# Patient Record
Sex: FEMALE | Race: WHITE | Employment: FULL TIME | URBAN - METROPOLITAN AREA
[De-identification: names, ages, dates, MRNs, and addresses within clinical notes are randomized per-mention and may not be internally consistent; named-entity substitution may affect disease eponyms.]

---

## 2017-01-03 RX ORDER — LOPERAMIDE HYDROCHLORIDE 2 MG/1
CAPSULE ORAL
Qty: 60 CAPSULE | Refills: 0 | OUTPATIENT
Start: 2017-01-03

## 2017-01-11 ENCOUNTER — TELEPHONE (OUTPATIENT)
Dept: INTERNAL MEDICINE CLINIC | Facility: CLINIC | Age: 22
End: 2017-01-11

## 2017-01-11 NOTE — TELEPHONE ENCOUNTER
Spoke to pt. States she missed the last step this morning while carrying her baby. Pt landed on right side. C/o right whole hand numbness and tingling that is now radiating up forearm. Denies swelling, pain, or bruising. No change in .   Will updat

## 2017-01-11 NOTE — TELEPHONE ENCOUNTER
She needs an evaluation and possible imaging. Would recommend she go to immediate care for evaluation.

## 2017-01-11 NOTE — TELEPHONE ENCOUNTER
Patient took a fall down the stairs at home this morning and her right hand is tingling and has numbness. She reports there is no discoloration or swelling. 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

## 2017-01-27 ENCOUNTER — TELEPHONE (OUTPATIENT)
Dept: INTERNAL MEDICINE CLINIC | Facility: CLINIC | Age: 22
End: 2017-01-27

## 2017-01-28 ENCOUNTER — OFFICE VISIT (OUTPATIENT)
Dept: FAMILY MEDICINE CLINIC | Facility: CLINIC | Age: 22
End: 2017-01-28

## 2017-01-28 VITALS
RESPIRATION RATE: 18 BRPM | BODY MASS INDEX: 41.77 KG/M2 | TEMPERATURE: 98 F | WEIGHT: 282 LBS | DIASTOLIC BLOOD PRESSURE: 90 MMHG | OXYGEN SATURATION: 98 % | HEIGHT: 69 IN | SYSTOLIC BLOOD PRESSURE: 130 MMHG | HEART RATE: 77 BPM

## 2017-01-28 DIAGNOSIS — J02.9 SORE THROAT: ICD-10-CM

## 2017-01-28 DIAGNOSIS — J00 ACUTE NASOPHARYNGITIS: Primary | ICD-10-CM

## 2017-01-28 LAB — CONTROL LINE PRESENT WITH A CLEAR BACKGROUND (YES/NO): YES YES/NO

## 2017-01-28 PROCEDURE — 87081 CULTURE SCREEN ONLY: CPT | Performed by: PHYSICIAN ASSISTANT

## 2017-01-28 PROCEDURE — 87880 STREP A ASSAY W/OPTIC: CPT | Performed by: PHYSICIAN ASSISTANT

## 2017-01-28 PROCEDURE — 99213 OFFICE O/P EST LOW 20 MIN: CPT | Performed by: PHYSICIAN ASSISTANT

## 2017-01-28 RX ORDER — FLUTICASONE PROPIONATE 50 MCG
SPRAY, SUSPENSION (ML) NASAL
Qty: 1 BOTTLE | Refills: 1 | Status: SHIPPED | OUTPATIENT
Start: 2017-01-28 | End: 2017-03-27

## 2017-01-28 RX ORDER — GUAIFENESIN 600 MG
1200 TABLET, EXTENDED RELEASE 12 HR ORAL 2 TIMES DAILY
Qty: 30 TABLET | Refills: 0 | Status: SHIPPED | OUTPATIENT
Start: 2017-01-28 | End: 2017-03-27

## 2017-01-28 RX ORDER — GUAIFENESIN 600 MG
1200 TABLET, EXTENDED RELEASE 12 HR ORAL 2 TIMES DAILY
Qty: 30 TABLET | Refills: 0 | Status: SHIPPED | OUTPATIENT
Start: 2017-01-28 | End: 2017-01-28

## 2017-01-28 NOTE — PROGRESS NOTES
CHIEF COMPLAINT:   Patient presents with:  Sore Throat: upset stomach, runny nose x 5 days         HPI:   Kemar Pollard is a 19year old female presents to clinic with complaint of sore throat. Patient has had for 5 days.  Symptoms have been worsening SKIN: no rashes,no suspicious lesions  HEAD: atraumatic, normocephalic  EYES: conjunctiva clear, EOM intact  EARS: TM's clear, non-injected, no bulging, retraction, or fluid bilaterally  NOSE: nostrils patent, mild clear nasal discharge, nasal mucosa mildl Self-Care for Sore Throats  Sore throats happen for many reasons, such as colds, allergies, and infections caused by viruses or bacteria. In any case, your throat becomes red and sore.  Your goal for self-care is to reduce your discomfort while giving your Contact your healthcare provider if you have:  · A temperature over 101°F (38.3°C)  · White spots on the throat  · Great difficulty swallowing  · Trouble breathing  · A skin rash  · Recent exposure to someone else with strep bacteria  · Severe hoarseness a

## 2017-02-01 RX ORDER — FLUCONAZOLE 150 MG/1
TABLET ORAL
Qty: 2 TABLET | Refills: 0 | OUTPATIENT
Start: 2017-02-01

## 2017-02-17 ENCOUNTER — TELEPHONE (OUTPATIENT)
Dept: OBGYN CLINIC | Facility: CLINIC | Age: 22
End: 2017-02-17

## 2017-02-17 NOTE — TELEPHONE ENCOUNTER
Received fax from [facility] requesting   Lutera order for 3 month supply. Pt only has 1 month on file and insurance  Only covers 3 months at a time. Pt cancelled scheduled annual 1/28/17. No apt rescheduled.      PSR-please help pt schedule annual a

## 2017-02-20 RX ORDER — LEVONORGESTREL AND ETHINYL ESTRADIOL 0.1-0.02MG
1 KIT ORAL DAILY
Qty: 3 PACKAGE | Refills: 0 | Status: SHIPPED | OUTPATIENT
Start: 2017-02-20 | End: 2017-03-27

## 2017-02-20 NOTE — TELEPHONE ENCOUNTER
Med ordered x 3 months. Pt was to start new pack on Sunday; missed one day. Pt advised to start pack today; take 2 pills today and continue with one per day. Pt states she is not sexually active.   Pt advised to use back up birth control during this pack

## 2017-02-20 NOTE — TELEPHONE ENCOUNTER
Pt calling and made Annual for 4/27/16    Please refill medication    2) Pt missed 2 days of birth control      Please call and advise

## 2017-03-27 ENCOUNTER — OFFICE VISIT (OUTPATIENT)
Dept: OBGYN CLINIC | Facility: CLINIC | Age: 22
End: 2017-03-27

## 2017-03-27 VITALS
BODY MASS INDEX: 41.95 KG/M2 | HEIGHT: 68.5 IN | SYSTOLIC BLOOD PRESSURE: 100 MMHG | WEIGHT: 280 LBS | HEART RATE: 110 BPM | DIASTOLIC BLOOD PRESSURE: 66 MMHG

## 2017-03-27 DIAGNOSIS — Z11.3 SCREENING FOR STD (SEXUALLY TRANSMITTED DISEASE): ICD-10-CM

## 2017-03-27 DIAGNOSIS — Z12.39 BREAST CANCER SCREENING: ICD-10-CM

## 2017-03-27 DIAGNOSIS — N89.8 VAGINAL DISCHARGE: ICD-10-CM

## 2017-03-27 DIAGNOSIS — Z01.419 ENCOUNTER FOR GYNECOLOGICAL EXAMINATION WITHOUT ABNORMAL FINDING: Primary | ICD-10-CM

## 2017-03-27 DIAGNOSIS — Z12.4 CERVICAL CANCER SCREENING: ICD-10-CM

## 2017-03-27 PROCEDURE — 87491 CHLMYD TRACH DNA AMP PROBE: CPT | Performed by: OBSTETRICS & GYNECOLOGY

## 2017-03-27 PROCEDURE — 87591 N.GONORRHOEAE DNA AMP PROB: CPT | Performed by: OBSTETRICS & GYNECOLOGY

## 2017-03-27 PROCEDURE — 87510 GARDNER VAG DNA DIR PROBE: CPT | Performed by: OBSTETRICS & GYNECOLOGY

## 2017-03-27 PROCEDURE — 99395 PREV VISIT EST AGE 18-39: CPT | Performed by: OBSTETRICS & GYNECOLOGY

## 2017-03-27 PROCEDURE — 87480 CANDIDA DNA DIR PROBE: CPT | Performed by: OBSTETRICS & GYNECOLOGY

## 2017-03-27 PROCEDURE — 87660 TRICHOMONAS VAGIN DIR PROBE: CPT | Performed by: OBSTETRICS & GYNECOLOGY

## 2017-03-27 RX ORDER — LEVONORGESTREL AND ETHINYL ESTRADIOL 0.1-0.02MG
1 KIT ORAL DAILY
Qty: 3 PACKAGE | Refills: 4 | Status: SHIPPED | OUTPATIENT
Start: 2017-03-27 | End: 2017-08-07

## 2017-03-27 NOTE — PROGRESS NOTES
GYN H&P     3/27/2017  6:13 PM    CC: No chief complaint on file. HPI: patient is a 23year old [id number] here for her annual gyne exam.   She has no major complaints.  Noted some vaginal discharge, no burning or irritiation   Menses are regular on ocp, facility-administered medications on file prior to visit.   Family History   Problem Relation Age of Onset   • Stroke Mother    • Cancer Mother      breast   • Psychiatric Mother    • Thyroid Disorder Mother    • Diabetes Maternal Grandmother    • Breast Ca apparent distress  SKIN: no rashes, no suspicious lesions  HEENT: normal  NECK: supple; no thyroidmegaly, no adenopathy  LUNGS: clear to auscultation  CARDIOVASCULAR: normal S1, S2, RRR  BREASTS: soft, nontendder, no palpable masses or nodes, no nipple dis

## 2017-03-28 LAB
C TRACH DNA SPEC QL NAA+PROBE: NEGATIVE
N GONORRHOEA DNA SPEC QL NAA+PROBE: NEGATIVE

## 2017-03-28 RX ORDER — METRONIDAZOLE 7.5 MG/G
1 GEL VAGINAL NIGHTLY
Qty: 1 TUBE | Refills: 0 | Status: SHIPPED | OUTPATIENT
Start: 2017-03-28 | End: 2017-08-07

## 2017-03-28 RX ORDER — METRONIDAZOLE 7.5 MG/G
1 GEL VAGINAL NIGHTLY
Qty: 70 G | Refills: 0 | Status: SHIPPED | OUTPATIENT
Start: 2017-03-28 | End: 2017-04-02

## 2017-03-28 NOTE — PROGRESS NOTES
Quick Note:    Patient informed of results. Verbalized understanding. RX sent to pharmacy. No further questions or concerns @ this time.   ______

## 2017-03-28 NOTE — PROGRESS NOTES
Quick Note:    Please call patient and let her know she was positive for BV. GC still pending.  I will treat it with Metrogel, I will place RX.  ______

## 2017-04-04 ENCOUNTER — TELEPHONE (OUTPATIENT)
Dept: OBGYN CLINIC | Facility: CLINIC | Age: 22
End: 2017-04-04

## 2017-04-04 RX ORDER — METRONIDAZOLE 500 MG/1
500 TABLET ORAL 2 TIMES DAILY
Qty: 14 TABLET | Refills: 0 | Status: SHIPPED | OUTPATIENT
Start: 2017-04-04 | End: 2017-08-07

## 2017-04-04 NOTE — TELEPHONE ENCOUNTER
Pt last seen 3/27/17. BV per vaginitis swab. Pt completed Metrogel x 7 days; reports no improvement of symptoms. Pt c/o clear, watery discharge and itching; denies odor. Routed to Dr. Estrella Henderson. Please advise.

## 2017-04-04 NOTE — TELEPHONE ENCOUNTER
Medication sent to pharmacy. Patient notified. Verbalized understanding. No further questions or concerns.

## 2017-04-27 ENCOUNTER — TELEPHONE (OUTPATIENT)
Dept: INTERNAL MEDICINE CLINIC | Facility: CLINIC | Age: 22
End: 2017-04-27

## 2017-04-27 ENCOUNTER — HOSPITAL ENCOUNTER (OUTPATIENT)
Age: 22
Discharge: HOME | End: 2017-04-27
Attending: FAMILY MEDICINE
Payer: COMMERCIAL

## 2017-04-27 ENCOUNTER — APPOINTMENT (OUTPATIENT)
Dept: GENERAL RADIOLOGY | Age: 22
End: 2017-04-27
Attending: FAMILY MEDICINE
Payer: COMMERCIAL

## 2017-04-27 VITALS
SYSTOLIC BLOOD PRESSURE: 131 MMHG | RESPIRATION RATE: 16 BRPM | HEART RATE: 78 BPM | OXYGEN SATURATION: 99 % | WEIGHT: 277 LBS | HEIGHT: 69 IN | BODY MASS INDEX: 41.03 KG/M2 | DIASTOLIC BLOOD PRESSURE: 84 MMHG | TEMPERATURE: 98 F

## 2017-04-27 DIAGNOSIS — M25.561 CHRONIC PAIN OF BOTH KNEES: Primary | ICD-10-CM

## 2017-04-27 DIAGNOSIS — M25.562 CHRONIC PAIN OF BOTH KNEES: Primary | ICD-10-CM

## 2017-04-27 DIAGNOSIS — G89.29 CHRONIC PAIN OF BOTH KNEES: Primary | ICD-10-CM

## 2017-04-27 PROCEDURE — 73560 X-RAY EXAM OF KNEE 1 OR 2: CPT

## 2017-04-27 PROCEDURE — 99213 OFFICE O/P EST LOW 20 MIN: CPT

## 2017-04-27 NOTE — ED PROVIDER NOTES
Patient Seen in: [facility]    History   Patient presents with:  Lower Extremity Injury (musculoskeletal): Bilateral knee pain    Stated Complaint: knee injury x2 months    HPI    Joon Sullivan is a 23year old female pr Neurological Disorder Maternal Grandmother      ALS   • Psychiatric Father    • Hypertension Maternal Grandfather    • Diabetes Maternal Grandfather    • Breast Cancer Maternal Aunt          Smoking Status: Former Smoker                   Packs/Day: 0.50 referral.  Avoid using excessive Advil.     Disposition and Plan     Clinical Impression:  Chronic pain of both knees  (primary encounter diagnosis)    Disposition:  Discharge    Follow-up:  Kim Goddard, [address] [address]

## 2017-04-27 NOTE — TELEPHONE ENCOUNTER
Pt seen at immediate care 4-27-17 knee pain, Dr elias told her to get recommendation for ortho from pcp. Please advise.  Thank you

## 2017-04-27 NOTE — ED INITIAL ASSESSMENT (HPI)
Pt c/o bilateral knee pain from February but does not remember a specific injury. Pt states she is just having pain with bending her knees. Denies numbness and tingling to her feet. Pt has been taking Ibuprofen for her knee pain.   States that the pain i

## 2017-05-01 NOTE — TELEPHONE ENCOUNTER
Would recommend she make an appt with us for follow up immediate care visit.  Will discuss at visit if she should see ortho or if she can try PT first.

## 2017-07-12 RX ORDER — VITAMIN A ACETATE, BETA CAROTENE, ASCORBIC ACID, CHOLECALCIFEROL, .ALPHA.-TOCOPHEROL ACETATE, DL-, THIAMINE MONONITRATE, RIBOFLAVIN, NIACINAMIDE, PYRIDOXINE HYDROCHLORIDE, FOLIC ACID, CYANOCOBALAMIN, CALCIUM CARBONATE, FERROUS FUMARATE, ZINC OXIDE, CUPRIC OXIDE 3080; 12; 120; 400; 1; 1.84; 3; 20; 22; 920; 25; 200; 27; 10; 2 [IU]/1; UG/1; MG/1; [IU]/1; MG/1; MG/1; MG/1; MG/1; MG/1; [IU]/1; MG/1; MG/1; MG/1; MG/1; MG/1
TABLET, FILM COATED ORAL
Qty: 90 TABLET | Refills: 0 | Status: SHIPPED | OUTPATIENT
Start: 2017-07-12 | End: 2017-08-07

## 2017-08-07 ENCOUNTER — OFFICE VISIT (OUTPATIENT)
Dept: OBGYN CLINIC | Facility: CLINIC | Age: 22
End: 2017-08-07

## 2017-08-07 VITALS
WEIGHT: 288 LBS | BODY MASS INDEX: 42.65 KG/M2 | DIASTOLIC BLOOD PRESSURE: 80 MMHG | HEIGHT: 69 IN | SYSTOLIC BLOOD PRESSURE: 128 MMHG | HEART RATE: 97 BPM

## 2017-08-07 DIAGNOSIS — Z34.81 PRENATAL CARE, SUBSEQUENT PREGNANCY, FIRST TRIMESTER: Primary | ICD-10-CM

## 2017-08-07 PROBLEM — Z34.80 PRENATAL CARE, SUBSEQUENT PREGNANCY: Status: ACTIVE | Noted: 2017-08-07

## 2017-08-07 PROBLEM — Z34.80 PRENATAL CARE, SUBSEQUENT PREGNANCY (HCC): Status: ACTIVE | Noted: 2017-08-07

## 2017-08-07 PROCEDURE — 87086 URINE CULTURE/COLONY COUNT: CPT | Performed by: OBSTETRICS & GYNECOLOGY

## 2017-08-07 RX ORDER — PRENATAL VIT/IRON FUM/FOLIC AC 27MG-0.8MG
1 TABLET ORAL DAILY
COMMUNITY
End: 2017-10-30

## 2017-08-07 NOTE — PROGRESS NOTES
NOB  Messed up with OC, ? LMP 6/1/17  PMH: neg  PSH: neg  Induced both pregnancies, decreased FM, PIH  Prenatal care and course discussed with patient including ultrasounds, genetic testing options, frequency of visits, expected range of normal weight gain,

## 2017-09-02 ENCOUNTER — LAB ENCOUNTER (OUTPATIENT)
Dept: LAB | Facility: HOSPITAL | Age: 22
End: 2017-09-02
Attending: OBSTETRICS & GYNECOLOGY
Payer: COMMERCIAL

## 2017-09-02 ENCOUNTER — OFFICE VISIT (OUTPATIENT)
Dept: OBGYN CLINIC | Facility: CLINIC | Age: 22
End: 2017-09-02

## 2017-09-02 VITALS
SYSTOLIC BLOOD PRESSURE: 118 MMHG | BODY MASS INDEX: 42.36 KG/M2 | DIASTOLIC BLOOD PRESSURE: 80 MMHG | HEIGHT: 69 IN | WEIGHT: 286 LBS

## 2017-09-02 DIAGNOSIS — Z34.81 PRENATAL CARE, SUBSEQUENT PREGNANCY, FIRST TRIMESTER: ICD-10-CM

## 2017-09-02 DIAGNOSIS — Z34.91 ENCOUNTER FOR SUPERVISION OF NORMAL PREGNANCY IN FIRST TRIMESTER, UNSPECIFIED GRAVIDITY: Primary | ICD-10-CM

## 2017-09-02 LAB
ANTIBODY SCREEN: NEGATIVE
BASOPHILS # BLD AUTO: 0.03 X10(3) UL (ref 0–0.1)
BASOPHILS NFR BLD AUTO: 0.3 %
EOSINOPHIL # BLD AUTO: 0.07 X10(3) UL (ref 0–0.3)
EOSINOPHIL NFR BLD AUTO: 0.6 %
ERYTHROCYTE [DISTWIDTH] IN BLOOD BY AUTOMATED COUNT: 13.5 % (ref 11.5–16)
HBV SURFACE AG SERPL QL IA: NONREACTIVE
HCT VFR BLD AUTO: 38.3 % (ref 34–50)
HGB BLD-MCNC: 12.4 G/DL (ref 12–16)
IMMATURE GRANULOCYTE COUNT: 0.04 X10(3) UL (ref 0–1)
IMMATURE GRANULOCYTE RATIO %: 0.4 %
LYMPHOCYTES # BLD AUTO: 2.62 X10(3) UL (ref 0.9–4)
LYMPHOCYTES NFR BLD AUTO: 23.1 %
MCH RBC QN AUTO: 29.1 PG (ref 27–33.2)
MCHC RBC AUTO-ENTMCNC: 32.4 G/DL (ref 31–37)
MCV RBC AUTO: 89.9 FL (ref 81–100)
MONOCYTES # BLD AUTO: 0.57 X10(3) UL (ref 0.1–0.6)
MONOCYTES NFR BLD AUTO: 5 %
NEUTROPHIL ABS PRELIM: 8.03 X10 (3) UL (ref 1.3–6.7)
NEUTROPHILS # BLD AUTO: 8.03 X10(3) UL (ref 1.3–6.7)
NEUTROPHILS NFR BLD AUTO: 70.6 %
PLATELET # BLD AUTO: 231 10(3)UL (ref 150–450)
RBC # BLD AUTO: 4.26 X10(6)UL (ref 3.8–5.1)
RED CELL DISTRIBUTION WIDTH-SD: 44.7 FL (ref 35.1–46.3)
RH BLOOD TYPE: NEGATIVE
RUBELLA IGG QUANTITATIVE: 253.7 IU/ML
RUBV IGG SER QL: POSITIVE
T PALLIDUM AB SER QL IA: NONREACTIVE
WBC # BLD AUTO: 11.4 X10(3) UL (ref 4–13)

## 2017-09-02 PROCEDURE — 86900 BLOOD TYPING SEROLOGIC ABO: CPT

## 2017-09-02 PROCEDURE — 36415 COLL VENOUS BLD VENIPUNCTURE: CPT

## 2017-09-02 PROCEDURE — 87340 HEPATITIS B SURFACE AG IA: CPT

## 2017-09-02 PROCEDURE — 86762 RUBELLA ANTIBODY: CPT

## 2017-09-02 PROCEDURE — 86850 RBC ANTIBODY SCREEN: CPT

## 2017-09-02 PROCEDURE — 86901 BLOOD TYPING SEROLOGIC RH(D): CPT

## 2017-09-02 PROCEDURE — 86780 TREPONEMA PALLIDUM: CPT

## 2017-09-02 PROCEDURE — 87389 HIV-1 AG W/HIV-1&-2 AB AG IA: CPT

## 2017-09-02 PROCEDURE — 85025 COMPLETE CBC W/AUTO DIFF WBC: CPT

## 2017-09-02 NOTE — PROGRESS NOTES
MINOO  Doing well  No complaints.  Denies LOF/VB/uctx  Prenatal labs need to be collected, pt reports she will go for lab draw today  RH negative  Genetic testing declined   Anatomy Scan 18-20 wks    RTC in 4 wks

## 2017-09-02 NOTE — PATIENT INSTRUCTIONS
Please go to have your routine prenatal labs drawn as soon as possible   Please call us if having any vaginal bleeding, loss of fluid, severe cramping

## 2017-09-08 RX ORDER — FLUCONAZOLE 150 MG/1
TABLET ORAL
Qty: 2 TABLET | Refills: 0 | OUTPATIENT
Start: 2017-09-08

## 2017-09-30 ENCOUNTER — OFFICE VISIT (OUTPATIENT)
Dept: OBGYN CLINIC | Facility: CLINIC | Age: 22
End: 2017-09-30

## 2017-09-30 VITALS
DIASTOLIC BLOOD PRESSURE: 70 MMHG | WEIGHT: 279 LBS | HEIGHT: 69 IN | SYSTOLIC BLOOD PRESSURE: 120 MMHG | BODY MASS INDEX: 41.32 KG/M2

## 2017-09-30 DIAGNOSIS — Z34.82 PRENATAL CARE, SUBSEQUENT PREGNANCY, SECOND TRIMESTER: Primary | ICD-10-CM

## 2017-09-30 NOTE — PROGRESS NOTES
MINOO  Doing well  No complaints.  No LOF/VB/uctx  RH neg, needs rhogam  Genetic testing declined (first, quad/AFP)  Anatomy Scan next visit (18-20weeks)    watch weight, lost 1 lb

## 2017-10-23 RX ORDER — VITAMIN A ACETATE, BETA CAROTENE, ASCORBIC ACID, CHOLECALCIFEROL, .ALPHA.-TOCOPHEROL ACETATE, DL-, THIAMINE MONONITRATE, RIBOFLAVIN, NIACINAMIDE, PYRIDOXINE HYDROCHLORIDE, FOLIC ACID, CYANOCOBALAMIN, CALCIUM CARBONATE, FERROUS FUMARATE, ZINC OXIDE, CUPRIC OXIDE 3080; 12; 120; 400; 1; 1.84; 3; 20; 22; 920; 25; 200; 27; 10; 2 [IU]/1; UG/1; MG/1; [IU]/1; MG/1; MG/1; MG/1; MG/1; MG/1; [IU]/1; MG/1; MG/1; MG/1; MG/1; MG/1
TABLET, FILM COATED ORAL
Qty: 90 TABLET | Refills: 3 | Status: SHIPPED | OUTPATIENT
Start: 2017-10-23 | End: 2018-04-02

## 2017-10-30 ENCOUNTER — OFFICE VISIT (OUTPATIENT)
Dept: OBGYN CLINIC | Facility: CLINIC | Age: 22
End: 2017-10-30

## 2017-10-30 ENCOUNTER — APPOINTMENT (OUTPATIENT)
Dept: OBGYN CLINIC | Facility: CLINIC | Age: 22
End: 2017-10-30

## 2017-10-30 VITALS
HEIGHT: 69 IN | BODY MASS INDEX: 41.47 KG/M2 | WEIGHT: 280 LBS | DIASTOLIC BLOOD PRESSURE: 78 MMHG | SYSTOLIC BLOOD PRESSURE: 124 MMHG

## 2017-10-30 DIAGNOSIS — Z34.82 PRENATAL CARE, SUBSEQUENT PREGNANCY, SECOND TRIMESTER: ICD-10-CM

## 2017-10-30 DIAGNOSIS — Z36.89 ENCOUNTER FOR FETAL ANATOMIC SURVEY: Primary | ICD-10-CM

## 2017-10-30 PROCEDURE — 76805 OB US >/= 14 WKS SNGL FETUS: CPT | Performed by: OBSTETRICS & GYNECOLOGY

## 2017-10-30 RX ORDER — VALACYCLOVIR HYDROCHLORIDE 500 MG/1
TABLET, FILM COATED ORAL
COMMUNITY
Start: 2017-10-23 | End: 2018-01-29

## 2017-10-30 NOTE — PROGRESS NOTES
MINOO  Doing well  RH neg, needs rhogan at 28 wks  S/P Anatomy scan wnl  1 hr glucose (24-28wks)  TDAP recommended during pregnancy and instructions given  RTC q4wks  Obesity, nsts at 36 wks

## 2017-11-11 ENCOUNTER — LAB ENCOUNTER (OUTPATIENT)
Dept: LAB | Facility: HOSPITAL | Age: 22
End: 2017-11-11
Attending: OBSTETRICS & GYNECOLOGY
Payer: COMMERCIAL

## 2017-11-11 DIAGNOSIS — Z34.82 PRENATAL CARE, SUBSEQUENT PREGNANCY, SECOND TRIMESTER: ICD-10-CM

## 2017-11-11 PROCEDURE — 36415 COLL VENOUS BLD VENIPUNCTURE: CPT

## 2017-11-11 PROCEDURE — 82950 GLUCOSE TEST: CPT

## 2017-11-11 PROCEDURE — 85025 COMPLETE CBC W/AUTO DIFF WBC: CPT

## 2017-11-11 PROCEDURE — 86850 RBC ANTIBODY SCREEN: CPT

## 2017-12-02 ENCOUNTER — OFFICE VISIT (OUTPATIENT)
Dept: OBGYN CLINIC | Facility: CLINIC | Age: 22
End: 2017-12-02

## 2017-12-02 VITALS
HEIGHT: 69 IN | DIASTOLIC BLOOD PRESSURE: 70 MMHG | WEIGHT: 288 LBS | SYSTOLIC BLOOD PRESSURE: 122 MMHG | BODY MASS INDEX: 42.65 KG/M2

## 2017-12-02 DIAGNOSIS — Z34.80 PRENATAL CARE OF MULTIGRAVIDA, ANTEPARTUM: Primary | ICD-10-CM

## 2017-12-02 NOTE — PROGRESS NOTES
MINOO  Doing well.  Denies LOF/VB/uctx  RH negative, Rhogam at 28wks  S/P Anatomy scan 10/30  1 hr glucose and CBC wnl   Obesity: NSTs weekly at 36 wks  HSV, confirm if genital infection and will need suppressive therapy at 36wks if indicated     RTC q2wks

## 2017-12-07 ENCOUNTER — OFFICE VISIT (OUTPATIENT)
Dept: FAMILY MEDICINE CLINIC | Facility: CLINIC | Age: 22
End: 2017-12-07

## 2017-12-07 VITALS
DIASTOLIC BLOOD PRESSURE: 72 MMHG | HEIGHT: 69 IN | HEART RATE: 91 BPM | WEIGHT: 288 LBS | TEMPERATURE: 98 F | OXYGEN SATURATION: 98 % | SYSTOLIC BLOOD PRESSURE: 126 MMHG | BODY MASS INDEX: 42.65 KG/M2

## 2017-12-07 DIAGNOSIS — R05.9 COUGH: ICD-10-CM

## 2017-12-07 DIAGNOSIS — J01.00 ACUTE NON-RECURRENT MAXILLARY SINUSITIS: Primary | ICD-10-CM

## 2017-12-07 PROCEDURE — 99213 OFFICE O/P EST LOW 20 MIN: CPT | Performed by: PHYSICIAN ASSISTANT

## 2017-12-07 RX ORDER — AMOXICILLIN 875 MG/1
875 TABLET, COATED ORAL 2 TIMES DAILY
Qty: 20 TABLET | Refills: 0 | Status: SHIPPED | OUTPATIENT
Start: 2017-12-07 | End: 2017-12-17

## 2017-12-07 NOTE — PROGRESS NOTES
CHIEF COMPLAINT:   Patient presents with:  Sore Throat: on and off, runny nose, drainage, cough is with phlegm, hoarse voice  x 2 wks     HPI:   Genia Westbrook is a 23year old female who presents for sinus congestion for  2  weeks.  Symptoms have been co Years: 0.00      Smokeless tobacco: Never Used                      Comment: quit in 2014  Alcohol use:  No                  REVIEW OF SYSTEMS:   GENERAL: feels well otherwise, no unplanned weight change,  normal appetite  SKIN: no rashes or abnormal skin Prescriptions Disp Refills    amoxicillin 875 MG Oral Tab 20 tablet 0     Sig: Take 1 tablet (875 mg total) by mouth 2 (two) times daily. Risks, benefits, side effects of medication addressed and explained.   The patient indicates understanding of t Use back up birth control for pregnancy prevention for up to 1 month due to antibiotic use    Probiotics or yogurt daily during antibioitic use will help decrease stomach upset and restore good bacteria to the gut.   Take the probiotic at least 2 -3 hours a

## 2017-12-16 ENCOUNTER — OFFICE VISIT (OUTPATIENT)
Dept: OBGYN CLINIC | Facility: CLINIC | Age: 22
End: 2017-12-16

## 2017-12-16 VITALS
HEIGHT: 69 IN | SYSTOLIC BLOOD PRESSURE: 118 MMHG | DIASTOLIC BLOOD PRESSURE: 70 MMHG | BODY MASS INDEX: 42.51 KG/M2 | WEIGHT: 287 LBS

## 2017-12-16 DIAGNOSIS — Z34.80 PRENATAL CARE OF MULTIGRAVIDA, ANTEPARTUM: Primary | ICD-10-CM

## 2017-12-19 ENCOUNTER — TELEPHONE (OUTPATIENT)
Dept: OBGYN CLINIC | Facility: CLINIC | Age: 22
End: 2017-12-19

## 2017-12-20 NOTE — TELEPHONE ENCOUNTER
Patient states she is feeling much better this morning. No longer has pain, no contractions, good FM, no vaginal bleeding/cramping. Advised patient to call back if any symptoms change. Verbalized understanding. No further questions or concerns.

## 2017-12-20 NOTE — TELEPHONE ENCOUNTER
Called with back pain, no contractions, good fetal movement.   Went to work today then shopping  Will try heat and tylenol  To L&D if no improvement  Otherwise can see in office in am

## 2017-12-30 ENCOUNTER — OFFICE VISIT (OUTPATIENT)
Dept: OBGYN CLINIC | Facility: CLINIC | Age: 22
End: 2017-12-30

## 2017-12-30 VITALS
HEIGHT: 69 IN | BODY MASS INDEX: 42.21 KG/M2 | WEIGHT: 285 LBS | DIASTOLIC BLOOD PRESSURE: 70 MMHG | SYSTOLIC BLOOD PRESSURE: 122 MMHG

## 2017-12-30 DIAGNOSIS — Z34.83 PRENATAL CARE, SUBSEQUENT PREGNANCY, THIRD TRIMESTER: Primary | ICD-10-CM

## 2017-12-30 NOTE — PROGRESS NOTES
Increase in symphysis discomfort and increase in vaginal secretions without associated symptoms. No evidence of vaginal infection. Cx long and closed. No presenting part palpable. Will get Tdap next time. See in 2 weeks.

## 2018-01-13 ENCOUNTER — OFFICE VISIT (OUTPATIENT)
Dept: OBGYN CLINIC | Facility: CLINIC | Age: 23
End: 2018-01-13

## 2018-01-13 VITALS
DIASTOLIC BLOOD PRESSURE: 68 MMHG | BODY MASS INDEX: 42.8 KG/M2 | WEIGHT: 289 LBS | HEIGHT: 69 IN | SYSTOLIC BLOOD PRESSURE: 118 MMHG

## 2018-01-13 DIAGNOSIS — Z34.83 PRENATAL CARE, SUBSEQUENT PREGNANCY, THIRD TRIMESTER: Primary | ICD-10-CM

## 2018-01-13 NOTE — PROGRESS NOTES
MINOO  Doing well, +FM  Denies VB/LOF/uctx  Rh neg, s/p rhogam, TDAP received at [place]  1hr glucose reviewed, wnl  RTC in 2 wks  Fetal movement instructions given  Suspicion for LGA, growth US next visit

## 2018-01-29 ENCOUNTER — APPOINTMENT (OUTPATIENT)
Dept: OBGYN CLINIC | Facility: CLINIC | Age: 23
End: 2018-01-29

## 2018-01-29 ENCOUNTER — OFFICE VISIT (OUTPATIENT)
Dept: OBGYN CLINIC | Facility: CLINIC | Age: 23
End: 2018-01-29

## 2018-01-29 VITALS
SYSTOLIC BLOOD PRESSURE: 118 MMHG | BODY MASS INDEX: 42.8 KG/M2 | HEIGHT: 69 IN | WEIGHT: 289 LBS | DIASTOLIC BLOOD PRESSURE: 78 MMHG

## 2018-01-29 DIAGNOSIS — O99.213 OBESITY AFFECTING PREGNANCY IN THIRD TRIMESTER: ICD-10-CM

## 2018-01-29 DIAGNOSIS — Z34.83 PRENATAL CARE, SUBSEQUENT PREGNANCY, THIRD TRIMESTER: Primary | ICD-10-CM

## 2018-01-29 DIAGNOSIS — Z36.89 ENCOUNTER FOR ULTRASOUND TO ASSESS FETAL GROWTH: ICD-10-CM

## 2018-01-29 PROCEDURE — 76816 OB US FOLLOW-UP PER FETUS: CPT | Performed by: OBSTETRICS & GYNECOLOGY

## 2018-01-29 RX ORDER — VALACYCLOVIR HYDROCHLORIDE 1 G/1
TABLET, FILM COATED ORAL
Qty: 90 TABLET | Refills: 1 | Status: SHIPPED | OUTPATIENT
Start: 2018-01-29 | End: 2018-10-23 | Stop reason: DRUGHIGH

## 2018-01-29 NOTE — PROGRESS NOTES
No issues to discuss. Good FM. Carrying female. Ultrasound with normal interval growth and MARIELY. EFW at 2868 gm (73rd percentile) and AC at 95th percentile. Vaginal exam and GBS next time. NST's and Valtrex prophylaxis at 36 weeks. See in one week.

## 2018-01-29 NOTE — PROGRESS NOTES
Here for third trimester growth ultrasound.  AUA EGA 35w 4d giving ultrasound STEVEN of March 1, 2018. One week advance growth.  Anterior placenta without previa.  AFV normal at 36.3 cm.  Cephalic.  EFW 6466 gm / 6 lb 5 oz (73rd percentile).

## 2018-02-03 ENCOUNTER — OFFICE VISIT (OUTPATIENT)
Dept: OBGYN CLINIC | Facility: CLINIC | Age: 23
End: 2018-02-03

## 2018-02-03 VITALS
BODY MASS INDEX: 43.4 KG/M2 | DIASTOLIC BLOOD PRESSURE: 64 MMHG | HEIGHT: 69 IN | WEIGHT: 293 LBS | SYSTOLIC BLOOD PRESSURE: 110 MMHG

## 2018-02-03 DIAGNOSIS — Z34.83 PRENATAL CARE, SUBSEQUENT PREGNANCY, THIRD TRIMESTER: Primary | ICD-10-CM

## 2018-02-03 DIAGNOSIS — O99.213 OBESITY AFFECTING PREGNANCY IN THIRD TRIMESTER: ICD-10-CM

## 2018-02-03 DIAGNOSIS — Z36.9 ENCOUNTER FOR ANTENATAL SCREENING OF MOTHER: ICD-10-CM

## 2018-02-03 PROCEDURE — 87653 STREP B DNA AMP PROBE: CPT | Performed by: OBSTETRICS & GYNECOLOGY

## 2018-02-03 PROCEDURE — 87081 CULTURE SCREEN ONLY: CPT | Performed by: OBSTETRICS & GYNECOLOGY

## 2018-02-03 NOTE — PROGRESS NOTES
No contractions. Good FM. GBS done. Cx TFT, 50%, and at brim. LI given. Start NST's for BMI next time in one week.

## 2018-02-10 ENCOUNTER — OFFICE VISIT (OUTPATIENT)
Dept: OBGYN CLINIC | Facility: CLINIC | Age: 23
End: 2018-02-10

## 2018-02-10 ENCOUNTER — APPOINTMENT (OUTPATIENT)
Dept: OBGYN CLINIC | Facility: CLINIC | Age: 23
End: 2018-02-10

## 2018-02-10 VITALS
HEIGHT: 69 IN | BODY MASS INDEX: 43.4 KG/M2 | WEIGHT: 293 LBS | SYSTOLIC BLOOD PRESSURE: 128 MMHG | DIASTOLIC BLOOD PRESSURE: 82 MMHG

## 2018-02-10 DIAGNOSIS — Z34.83 PRENATAL CARE, SUBSEQUENT PREGNANCY, THIRD TRIMESTER: Primary | ICD-10-CM

## 2018-02-10 DIAGNOSIS — O99.213 OBESITY AFFECTING PREGNANCY IN THIRD TRIMESTER: ICD-10-CM

## 2018-02-10 PROCEDURE — 59025 FETAL NON-STRESS TEST: CPT | Performed by: OBSTETRICS & GYNECOLOGY

## 2018-02-17 ENCOUNTER — OFFICE VISIT (OUTPATIENT)
Dept: OBGYN CLINIC | Facility: CLINIC | Age: 23
End: 2018-02-17

## 2018-02-17 ENCOUNTER — APPOINTMENT (OUTPATIENT)
Dept: OBGYN CLINIC | Facility: CLINIC | Age: 23
End: 2018-02-17

## 2018-02-17 VITALS — BODY MASS INDEX: 44 KG/M2 | SYSTOLIC BLOOD PRESSURE: 132 MMHG | DIASTOLIC BLOOD PRESSURE: 70 MMHG | WEIGHT: 293 LBS

## 2018-02-17 DIAGNOSIS — O99.213 OBESITY AFFECTING PREGNANCY IN THIRD TRIMESTER: Primary | ICD-10-CM

## 2018-02-17 DIAGNOSIS — Z34.83 PRENATAL CARE, SUBSEQUENT PREGNANCY, THIRD TRIMESTER: ICD-10-CM

## 2018-02-17 PROCEDURE — 59025 FETAL NON-STRESS TEST: CPT | Performed by: OBSTETRICS & GYNECOLOGY

## 2018-02-17 NOTE — PROGRESS NOTES
MINOO  Doing well, +FM  Denies VB/LOF/uctx  Mode of delivery:   anticipated  SVE /-3 vtx   GBS neg  RTC 1 week  NST reactive

## 2018-02-19 ENCOUNTER — TELEPHONE (OUTPATIENT)
Dept: OBGYN CLINIC | Facility: CLINIC | Age: 23
End: 2018-02-19

## 2018-02-19 NOTE — TELEPHONE ENCOUNTER
Patient was to call office if BP was still high. She took it  twice today and it  was 139/113 and 159/103. She is not sure if she needs to come in.  Please call

## 2018-02-19 NOTE — TELEPHONE ENCOUNTER
Primary complaint G/P and GA: [id number]. Called c/o elevated BP's. Patient reports taking her BP at work ([employer]). Readings from today were 146/85 & 138/94.  Patient's only c/o is H/A  Last OV: 02/17/18  Recommendations per last OV if related to comp

## 2018-02-20 ENCOUNTER — PATIENT MESSAGE (OUTPATIENT)
Dept: OBGYN CLINIC | Facility: CLINIC | Age: 23
End: 2018-02-20

## 2018-02-20 ENCOUNTER — LAB ENCOUNTER (OUTPATIENT)
Dept: LAB | Facility: HOSPITAL | Age: 23
End: 2018-02-20
Attending: OBSTETRICS & GYNECOLOGY
Payer: COMMERCIAL

## 2018-02-20 ENCOUNTER — TELEPHONE (OUTPATIENT)
Dept: OBGYN CLINIC | Facility: CLINIC | Age: 23
End: 2018-02-20

## 2018-02-20 ENCOUNTER — OFFICE VISIT (OUTPATIENT)
Dept: OBGYN CLINIC | Facility: CLINIC | Age: 23
End: 2018-02-20

## 2018-02-20 VITALS — SYSTOLIC BLOOD PRESSURE: 132 MMHG | BODY MASS INDEX: 44 KG/M2 | WEIGHT: 293 LBS | DIASTOLIC BLOOD PRESSURE: 84 MMHG

## 2018-02-20 DIAGNOSIS — Z87.59 HISTORY OF GESTATIONAL HYPERTENSION: ICD-10-CM

## 2018-02-20 DIAGNOSIS — Z87.59 HISTORY OF GESTATIONAL HYPERTENSION: Primary | ICD-10-CM

## 2018-02-20 DIAGNOSIS — O26.893 PREGNANCY HEADACHE IN THIRD TRIMESTER: ICD-10-CM

## 2018-02-20 DIAGNOSIS — R51.9 PREGNANCY HEADACHE IN THIRD TRIMESTER: ICD-10-CM

## 2018-02-20 LAB
ALBUMIN SERPL-MCNC: 2.6 G/DL (ref 3.5–4.8)
ALP LIVER SERPL-CCNC: 131 U/L (ref 37–98)
ALT SERPL-CCNC: 26 U/L (ref 14–54)
AST SERPL-CCNC: 19 U/L (ref 15–41)
BASOPHILS # BLD AUTO: 0.02 X10(3) UL (ref 0–0.1)
BASOPHILS NFR BLD AUTO: 0.1 %
BILIRUB SERPL-MCNC: 0.4 MG/DL (ref 0.1–2)
BUN BLD-MCNC: 10 MG/DL (ref 8–20)
CALCIUM BLD-MCNC: 8.9 MG/DL (ref 8.3–10.3)
CHLORIDE: 107 MMOL/L (ref 101–111)
CO2: 21 MMOL/L (ref 22–32)
CREAT BLD-MCNC: 0.59 MG/DL (ref 0.55–1.02)
EOSINOPHIL # BLD AUTO: 0.06 X10(3) UL (ref 0–0.3)
EOSINOPHIL NFR BLD AUTO: 0.4 %
ERYTHROCYTE [DISTWIDTH] IN BLOOD BY AUTOMATED COUNT: 13.6 % (ref 11.5–16)
GLUCOSE BLD-MCNC: 93 MG/DL (ref 70–99)
HCT VFR BLD AUTO: 36.2 % (ref 34–50)
HGB BLD-MCNC: 12.5 G/DL (ref 12–16)
IMMATURE GRANULOCYTE COUNT: 0.11 X10(3) UL (ref 0–1)
IMMATURE GRANULOCYTE RATIO %: 0.8 %
LYMPHOCYTES # BLD AUTO: 2.32 X10(3) UL (ref 0.9–4)
LYMPHOCYTES NFR BLD AUTO: 16.5 %
M PROTEIN MFR SERPL ELPH: 6.9 G/DL (ref 6.1–8.3)
MCH RBC QN AUTO: 31.3 PG (ref 27–33.2)
MCHC RBC AUTO-ENTMCNC: 34.5 G/DL (ref 31–37)
MCV RBC AUTO: 90.7 FL (ref 81–100)
MONOCYTES # BLD AUTO: 0.56 X10(3) UL (ref 0.1–1)
MONOCYTES NFR BLD AUTO: 4 %
NEUTROPHIL ABS PRELIM: 10.97 X10 (3) UL (ref 1.3–6.7)
NEUTROPHILS # BLD AUTO: 10.97 X10(3) UL (ref 1.3–6.7)
NEUTROPHILS NFR BLD AUTO: 78.2 %
PLATELET # BLD AUTO: 207 10(3)UL (ref 150–450)
POTASSIUM SERPL-SCNC: 3.6 MMOL/L (ref 3.6–5.1)
RBC # BLD AUTO: 3.99 X10(6)UL (ref 3.8–5.1)
RED CELL DISTRIBUTION WIDTH-SD: 45 FL (ref 35.1–46.3)
SODIUM SERPL-SCNC: 136 MMOL/L (ref 136–144)
URIC ACID: 5.1 MG/DL (ref 2.4–8)
WBC # BLD AUTO: 14 X10(3) UL (ref 4–13)

## 2018-02-20 PROCEDURE — 80053 COMPREHEN METABOLIC PANEL: CPT

## 2018-02-20 PROCEDURE — 84550 ASSAY OF BLOOD/URIC ACID: CPT

## 2018-02-20 PROCEDURE — 36415 COLL VENOUS BLD VENIPUNCTURE: CPT

## 2018-02-20 PROCEDURE — 85025 COMPLETE CBC W/AUTO DIFF WBC: CPT

## 2018-02-20 NOTE — PROGRESS NOTES
MINOO  Doing well, +FM  Denies LOF/VB/uctx  Mode of delivery:   anticipated  SVE /-3   GBS neg  HSV, continue suppression therapy  Hx of GTN, reports HA and mild nausea that has resolved but persistent yesterday.  Checked blood pressure at work yesterd

## 2018-02-20 NOTE — TELEPHONE ENCOUNTER
Pregnancy episode faxed to [facility] at [phone] per request form dropped off at appt.

## 2018-02-21 ENCOUNTER — HOSPITAL ENCOUNTER (OUTPATIENT)
Facility: HOSPITAL | Age: 23
Setting detail: OBSERVATION
Discharge: HOME | End: 2018-02-21
Attending: OBSTETRICS & GYNECOLOGY | Admitting: OBSTETRICS & GYNECOLOGY
Payer: COMMERCIAL

## 2018-02-21 ENCOUNTER — TELEPHONE (OUTPATIENT)
Dept: OBGYN CLINIC | Facility: CLINIC | Age: 23
End: 2018-02-21

## 2018-02-21 VITALS
HEART RATE: 112 BPM | SYSTOLIC BLOOD PRESSURE: 140 MMHG | BODY MASS INDEX: 43.4 KG/M2 | TEMPERATURE: 98 F | HEIGHT: 69 IN | WEIGHT: 293 LBS | DIASTOLIC BLOOD PRESSURE: 83 MMHG | RESPIRATION RATE: 20 BRPM

## 2018-02-21 PROBLEM — Z34.90 PREGNANCY: Status: ACTIVE | Noted: 2018-02-21

## 2018-02-21 PROBLEM — Z34.90 PREGNANCY (HCC): Status: ACTIVE | Noted: 2018-02-21

## 2018-02-21 LAB
BILIRUBIN URINE: NEGATIVE
CONTROL RUN WITHIN 24 HOURS?: YES
GLUCOSE URINE: NEGATIVE
KETONE URINE: NEGATIVE
LEUKOCYTE ESTERASE URINE: NEGATIVE
NITRITE URINE: NEGATIVE
PH URINE: 7 (ref 5–8)
PROTEIN URINE: NEGATIVE
SPEC GRAVITY: 1.01 (ref 1–1.03)
URINE CLARITY: CLEAR
URINE COLOR: YELLOW
UROBILINOGEN URINE: 0.2

## 2018-02-21 PROCEDURE — 99234 HOSP IP/OBS SM DT SF/LOW 45: CPT | Performed by: OBSTETRICS & GYNECOLOGY

## 2018-02-21 PROCEDURE — 59025 FETAL NON-STRESS TEST: CPT | Performed by: OBSTETRICS & GYNECOLOGY

## 2018-02-21 NOTE — H&P
22 YO [id number] with an STEVEN of March 8, 2018, 37w 6d EGA admitted due to L sided headache with nausea since Monday. No visual complaints. Hx of elevated blood pressures at end of prior pregnancies, but not pre eclampsia.  Borderline BP's over last several vis for headache. Has appt in several days. Discussed IOL after 39 weeks - will consider.

## 2018-02-21 NOTE — PROGRESS NOTES
Please inform the patient that her repeat HELLP labs wnl. Continue to monitor for symptoms of pre eclampsia. Please provide precautions.

## 2018-02-21 NOTE — PROGRESS NOTES
Pt is a 23year old female admitted to TRG3/TRG3-A. Pt is [id number] 37w6d intra-uterine pregnancy. Patient presents with:   Assessment: Pt c/o continuous headache since Monday, no relief from Tylenol.  Pt c/o intermittent cramping and intermitten

## 2018-02-21 NOTE — TELEPHONE ENCOUNTER
Primary complaint G/P and GA: headache, nausea, decreased fetal movement, , 37 6/7 wks    Contacted patient. She reports HA and severe nausea since Monday. Also having some constant, menstrual like cramping pain.  Was seen in office yesterday and ha

## 2018-02-21 NOTE — TELEPHONE ENCOUNTER
Called patient. She states she can be here in 30-45 minutes. Advised patient to present to labor and delivery triage. She states understanding. Labor and delivery notified.

## 2018-02-21 NOTE — TELEPHONE ENCOUNTER
From: Nida Justice  To: Annie Cartwright MD  Sent: 2/20/2018 7:10 PM CST  Subject: Other    Dr. August Livers   By any chance can you take a look over my test results and the NST that were done yesterday. I am very uneasy about how my appointment went.  I'

## 2018-02-21 NOTE — NST
Nonstress Test   Patient: Ubaldo Leigh    Gestation: 37w6d    NST:       Variability: Moderate           Accelerations: Yes           Decelerations: None            Baseline: 135 BPM           Uterine Irritability: No           Contractions: Irregular

## 2018-02-22 NOTE — PROGRESS NOTES
Patient informed of results. Verbalized understanding. Pre eclampsia symptoms discussed & provided precautions. No further questions or concerns at this time.

## 2018-02-23 ENCOUNTER — TELEPHONE (OUTPATIENT)
Dept: OBGYN UNIT | Facility: HOSPITAL | Age: 23
End: 2018-02-23

## 2018-02-23 ENCOUNTER — OFFICE VISIT (OUTPATIENT)
Dept: OBGYN CLINIC | Facility: CLINIC | Age: 23
End: 2018-02-23

## 2018-02-23 VITALS
BODY MASS INDEX: 43.4 KG/M2 | HEIGHT: 69 IN | DIASTOLIC BLOOD PRESSURE: 62 MMHG | SYSTOLIC BLOOD PRESSURE: 108 MMHG | WEIGHT: 293 LBS

## 2018-02-23 DIAGNOSIS — O26.893 PREGNANCY HEADACHE IN THIRD TRIMESTER: ICD-10-CM

## 2018-02-23 DIAGNOSIS — R51.9 PREGNANCY HEADACHE IN THIRD TRIMESTER: ICD-10-CM

## 2018-02-23 DIAGNOSIS — Z87.59 HISTORY OF GESTATIONAL HYPERTENSION: ICD-10-CM

## 2018-02-23 DIAGNOSIS — Z34.83 PRENATAL CARE, SUBSEQUENT PREGNANCY, THIRD TRIMESTER: Primary | ICD-10-CM

## 2018-02-23 NOTE — PROGRESS NOTES
Headache better. Still with R sciatica. Good FM. Cx as above. Thinks may have had HSV outbreak one week ago - no lesions seen. Wants IOL - scheduled for 3/1 at 715. See again next week prior to IOL.

## 2018-02-26 ENCOUNTER — TELEPHONE (OUTPATIENT)
Dept: OBGYN UNIT | Facility: HOSPITAL | Age: 23
End: 2018-02-26

## 2018-02-27 ENCOUNTER — TELEPHONE (OUTPATIENT)
Dept: OBGYN UNIT | Facility: HOSPITAL | Age: 23
End: 2018-02-27

## 2018-02-27 ENCOUNTER — OFFICE VISIT (OUTPATIENT)
Dept: OBGYN CLINIC | Facility: CLINIC | Age: 23
End: 2018-02-27

## 2018-02-27 VITALS — SYSTOLIC BLOOD PRESSURE: 118 MMHG | BODY MASS INDEX: 44 KG/M2 | DIASTOLIC BLOOD PRESSURE: 70 MMHG | WEIGHT: 293 LBS

## 2018-02-27 DIAGNOSIS — Z34.83 PRENATAL CARE, SUBSEQUENT PREGNANCY, THIRD TRIMESTER: Primary | ICD-10-CM

## 2018-02-27 DIAGNOSIS — O99.213 OBESITY AFFECTING PREGNANCY IN THIRD TRIMESTER: ICD-10-CM

## 2018-02-27 DIAGNOSIS — R51.9 PREGNANCY HEADACHE IN THIRD TRIMESTER: ICD-10-CM

## 2018-02-27 DIAGNOSIS — O26.893 PREGNANCY HEADACHE IN THIRD TRIMESTER: ICD-10-CM

## 2018-02-27 PROCEDURE — 59025 FETAL NON-STRESS TEST: CPT | Performed by: OBSTETRICS & GYNECOLOGY

## 2018-02-27 NOTE — PROGRESS NOTES
Headaches better. Scattered irregular contractions. Good FM. NST reactive (done for BMI). Still wants IOL as scheduled. Technique and potential length of labor discussed.   Will want epidural.

## 2018-02-28 ENCOUNTER — TELEPHONE (OUTPATIENT)
Dept: OBGYN CLINIC | Facility: CLINIC | Age: 23
End: 2018-02-28

## 2018-02-28 ENCOUNTER — TELEPHONE (OUTPATIENT)
Dept: OBGYN UNIT | Facility: HOSPITAL | Age: 23
End: 2018-02-28

## 2018-02-28 NOTE — TELEPHONE ENCOUNTER
Received FMLA forms at the end of the day. Placed in [address] [place] [address] in [facility]. PT will be induced 03/01/2018    Please call the PT when forms are completed and then transfer to [facility] to collect payment.

## 2018-03-01 ENCOUNTER — HOSPITAL ENCOUNTER (INPATIENT)
Facility: HOSPITAL | Age: 23
LOS: 2 days | Discharge: HOME | End: 2018-03-03
Attending: OBSTETRICS & GYNECOLOGY | Admitting: OBSTETRICS & GYNECOLOGY
Payer: COMMERCIAL

## 2018-03-01 ENCOUNTER — APPOINTMENT (OUTPATIENT)
Dept: OBGYN CLINIC | Facility: HOSPITAL | Age: 23
End: 2018-03-01
Payer: COMMERCIAL

## 2018-03-01 LAB
ALBUMIN SERPL-MCNC: 2.5 G/DL (ref 3.5–4.8)
ALP LIVER SERPL-CCNC: 135 U/L (ref 37–98)
ALT SERPL-CCNC: 18 U/L (ref 14–54)
ANTIBODY SCREEN: NEGATIVE
AST SERPL-CCNC: 17 U/L (ref 15–41)
BILIRUB SERPL-MCNC: 0.2 MG/DL (ref 0.1–2)
BILIRUBIN URINE: NEGATIVE
BUN BLD-MCNC: 10 MG/DL (ref 8–20)
CALCIUM BLD-MCNC: 8.8 MG/DL (ref 8.3–10.3)
CHLORIDE: 107 MMOL/L (ref 101–111)
CO2: 21 MMOL/L (ref 22–32)
CONTROL RUN WITHIN 24 HOURS?: YES
CREAT BLD-MCNC: 0.65 MG/DL (ref 0.55–1.02)
ERYTHROCYTE [DISTWIDTH] IN BLOOD BY AUTOMATED COUNT: 13.7 % (ref 11.5–16)
FETAL SCREEN RESULT: NEGATIVE
GLUCOSE BLD-MCNC: 111 MG/DL (ref 70–99)
GLUCOSE URINE: NEGATIVE
HCT VFR BLD AUTO: 37.4 % (ref 34–50)
HGB BLD-MCNC: 12.8 G/DL (ref 12–16)
KETONE URINE: NEGATIVE
LEUKOCYTE ESTERASE URINE: NEGATIVE
M PROTEIN MFR SERPL ELPH: 6.8 G/DL (ref 6.1–8.3)
MCH RBC QN AUTO: 31.3 PG (ref 27–33.2)
MCHC RBC AUTO-ENTMCNC: 34.2 G/DL (ref 31–37)
MCV RBC AUTO: 91.4 FL (ref 81–100)
NITRITE URINE: NEGATIVE
PH URINE: 6 (ref 5–8)
PLATELET # BLD AUTO: 168 10(3)UL (ref 150–450)
POTASSIUM SERPL-SCNC: 3.6 MMOL/L (ref 3.6–5.1)
PROTEIN URINE: NEGATIVE
RAPID HIV: NONREACTIVE
RBC # BLD AUTO: 4.09 X10(6)UL (ref 3.8–5.1)
RED CELL DISTRIBUTION WIDTH-SD: 45.2 FL (ref 35.1–46.3)
RH BLOOD TYPE: NEGATIVE
RH BLOOD TYPE: NEGATIVE
SODIUM SERPL-SCNC: 138 MMOL/L (ref 136–144)
SPEC GRAVITY: 1.02 (ref 1–1.03)
T PALLIDUM AB SER QL IA: NONREACTIVE
URIC ACID: 5.1 MG/DL (ref 2.4–8)
URINE CLARITY: CLEAR
URINE COLOR: YELLOW
UROBILINOGEN URINE: 0.2
WBC # BLD AUTO: 13.8 X10(3) UL (ref 4–13)

## 2018-03-01 PROCEDURE — 59400 OBSTETRICAL CARE: CPT | Performed by: OBSTETRICS & GYNECOLOGY

## 2018-03-01 PROCEDURE — 3E0334Z INTRODUCTION OF SERUM, TOXOID AND VACCINE INTO PERIPHERAL VEIN, PERCUTANEOUS APPROACH: ICD-10-PCS | Performed by: OBSTETRICS & GYNECOLOGY

## 2018-03-01 PROCEDURE — 3E033VJ INTRODUCTION OF OTHER HORMONE INTO PERIPHERAL VEIN, PERCUTANEOUS APPROACH: ICD-10-PCS | Performed by: OBSTETRICS & GYNECOLOGY

## 2018-03-01 PROCEDURE — 10907ZC DRAINAGE OF AMNIOTIC FLUID, THERAPEUTIC FROM PRODUCTS OF CONCEPTION, VIA NATURAL OR ARTIFICIAL OPENING: ICD-10-PCS | Performed by: OBSTETRICS & GYNECOLOGY

## 2018-03-01 RX ORDER — TERBUTALINE SULFATE 1 MG/ML
0.25 INJECTION, SOLUTION SUBCUTANEOUS AS NEEDED
Status: DISCONTINUED | OUTPATIENT
Start: 2018-03-01 | End: 2018-03-01

## 2018-03-01 RX ORDER — EPHEDRINE SULFATE 50 MG/ML
5 INJECTION, SOLUTION INTRAVENOUS AS NEEDED
Status: DISCONTINUED | OUTPATIENT
Start: 2018-03-01 | End: 2018-03-01

## 2018-03-01 RX ORDER — ACETAMINOPHEN 500 MG
500 TABLET ORAL ONCE AS NEEDED
Status: DISCONTINUED | OUTPATIENT
Start: 2018-03-01 | End: 2018-03-01

## 2018-03-01 RX ORDER — IBUPROFEN 600 MG/1
600 TABLET ORAL EVERY 6 HOURS
Status: DISCONTINUED | OUTPATIENT
Start: 2018-03-02 | End: 2018-03-03

## 2018-03-01 RX ORDER — ZOLPIDEM TARTRATE 5 MG/1
5 TABLET ORAL NIGHTLY PRN
Status: DISCONTINUED | OUTPATIENT
Start: 2018-03-01 | End: 2018-03-03

## 2018-03-01 RX ORDER — SIMETHICONE 80 MG
80 TABLET,CHEWABLE ORAL 3 TIMES DAILY PRN
Status: DISCONTINUED | OUTPATIENT
Start: 2018-03-01 | End: 2018-03-03

## 2018-03-01 RX ORDER — HYDROCODONE BITARTRATE AND ACETAMINOPHEN 5; 325 MG/1; MG/1
2 TABLET ORAL EVERY 4 HOURS PRN
Status: DISCONTINUED | OUTPATIENT
Start: 2018-03-01 | End: 2018-03-03

## 2018-03-01 RX ORDER — NALBUPHINE HCL 10 MG/ML
2.5 AMPUL (ML) INJECTION
Status: DISCONTINUED | OUTPATIENT
Start: 2018-03-01 | End: 2018-03-03

## 2018-03-01 RX ORDER — HYDROCODONE BITARTRATE AND ACETAMINOPHEN 5; 325 MG/1; MG/1
1 TABLET ORAL EVERY 4 HOURS PRN
Status: DISCONTINUED | OUTPATIENT
Start: 2018-03-01 | End: 2018-03-03

## 2018-03-01 RX ORDER — SODIUM CHLORIDE, SODIUM LACTATE, POTASSIUM CHLORIDE, CALCIUM CHLORIDE 600; 310; 30; 20 MG/100ML; MG/100ML; MG/100ML; MG/100ML
INJECTION, SOLUTION INTRAVENOUS CONTINUOUS
Status: DISCONTINUED | OUTPATIENT
Start: 2018-03-01 | End: 2018-03-01

## 2018-03-01 RX ORDER — BISACODYL 10 MG
10 SUPPOSITORY, RECTAL RECTAL ONCE AS NEEDED
Status: ACTIVE | OUTPATIENT
Start: 2018-03-01 | End: 2018-03-01

## 2018-03-01 RX ORDER — DEXTROSE, SODIUM CHLORIDE, SODIUM LACTATE, POTASSIUM CHLORIDE, AND CALCIUM CHLORIDE 5; .6; .31; .03; .02 G/100ML; G/100ML; G/100ML; G/100ML; G/100ML
INJECTION, SOLUTION INTRAVENOUS AS NEEDED
Status: DISCONTINUED | OUTPATIENT
Start: 2018-03-01 | End: 2018-03-01

## 2018-03-01 RX ORDER — IBUPROFEN 600 MG/1
600 TABLET ORAL ONCE AS NEEDED
Status: DISCONTINUED | OUTPATIENT
Start: 2018-03-01 | End: 2018-03-01

## 2018-03-01 RX ORDER — DOCUSATE SODIUM 100 MG/1
100 CAPSULE, LIQUID FILLED ORAL
Status: DISCONTINUED | OUTPATIENT
Start: 2018-03-01 | End: 2018-03-03

## 2018-03-01 RX ORDER — ACETAMINOPHEN 325 MG/1
650 TABLET ORAL EVERY 4 HOURS PRN
Status: DISCONTINUED | OUTPATIENT
Start: 2018-03-01 | End: 2018-03-03

## 2018-03-01 RX ORDER — TRISODIUM CITRATE DIHYDRATE AND CITRIC ACID MONOHYDRATE 500; 334 MG/5ML; MG/5ML
30 SOLUTION ORAL AS NEEDED
Status: DISCONTINUED | OUTPATIENT
Start: 2018-03-01 | End: 2018-03-01

## 2018-03-01 RX ORDER — ACYCLOVIR 400 MG/1
400 TABLET ORAL 2 TIMES DAILY
Status: DISCONTINUED | OUTPATIENT
Start: 2018-03-02 | End: 2018-03-03

## 2018-03-01 NOTE — PROGRESS NOTES
Pt is a 20year old female admitted to 111/111-A. Patient presents with:  Scheduled Induction: elective, possible gest. hypertension     Pt is [id number] 39w0d intra-uterine pregnancy. History obtained, consents signed.  Oriented to room, staff, and plan of

## 2018-03-01 NOTE — PROGRESS NOTES
Contractions Q 3 to 4 minutes and not uncomfortable. FHR reactive. Cx 2 cm, 100 %, -3. AROM attempted and not successful.

## 2018-03-01 NOTE — H&P
19 YO [id number] with an STEVEN of March 8, 2018, 39w 0d EGA admitted for IOL. Hx of sporadic elevated blood pressures associated with headache over last several weeks. Hx of pre eclampsia with first pregnancy.  Evaluation for recurrent pre eclampsia in this preg FM.    PLAN:  Pitocin IOL. Plan amniotomy at later time frame this morning.  Will want epidural.

## 2018-03-01 NOTE — PLAN OF CARE
Problem: Patient/Family Goals  Goal: Patient/Family Long Term Goal  Patient's Long Term Goal: Vaginal birth    Interventions:  - EFM, communication with healthcare providers  - See additional Care Plan goals for specific interventions   Outcome: Progressin

## 2018-03-01 NOTE — PROGRESS NOTES
Comfortable on epidural. Contractions Q 3 minutes. FHR category 1. Leakage of fluid around 1225 pm and clear. Exam done - 3 cm, 905, - 3 and membranes felt. AROM done with additional clear fluid. Continue IOL.

## 2018-03-02 LAB
BASOPHILS # BLD AUTO: 0.03 X10(3) UL (ref 0–0.1)
BASOPHILS NFR BLD AUTO: 0.2 %
EOSINOPHIL # BLD AUTO: 0.09 X10(3) UL (ref 0–0.3)
EOSINOPHIL NFR BLD AUTO: 0.6 %
ERYTHROCYTE [DISTWIDTH] IN BLOOD BY AUTOMATED COUNT: 13.8 % (ref 11.5–16)
HCT VFR BLD AUTO: 34.6 % (ref 34–50)
HGB BLD-MCNC: 11.6 G/DL (ref 12–16)
IMMATURE GRANULOCYTE COUNT: 0.1 X10(3) UL (ref 0–1)
IMMATURE GRANULOCYTE RATIO %: 0.7 %
LYMPHOCYTES # BLD AUTO: 3.01 X10(3) UL (ref 0.9–4)
LYMPHOCYTES NFR BLD AUTO: 21 %
MCH RBC QN AUTO: 31.6 PG (ref 27–33.2)
MCHC RBC AUTO-ENTMCNC: 33.5 G/DL (ref 31–37)
MCV RBC AUTO: 94.3 FL (ref 81–100)
MONOCYTES # BLD AUTO: 0.81 X10(3) UL (ref 0.1–1)
MONOCYTES NFR BLD AUTO: 5.6 %
NEUTROPHIL ABS PRELIM: 10.3 X10 (3) UL (ref 1.3–6.7)
NEUTROPHILS # BLD AUTO: 10.3 X10(3) UL (ref 1.3–6.7)
NEUTROPHILS NFR BLD AUTO: 71.9 %
PLATELET # BLD AUTO: 146 10(3)UL (ref 150–450)
RBC # BLD AUTO: 3.67 X10(6)UL (ref 3.8–5.1)
RED CELL DISTRIBUTION WIDTH-SD: 47.6 FL (ref 35.1–46.3)
WBC # BLD AUTO: 14.3 X10(3) UL (ref 4–13)

## 2018-03-02 NOTE — PROGRESS NOTES
PPD # 1. Doing well. Some cramping and will not nurse Voiding without issue. Bleeding acceptable - changing pads Q 3 to 4 hours. + Flatus. No BM.     /65   Pulse 89   Temp 98 °F (36.7 °C) (Oral)   Resp 16   Ht 69\"   Wt 294 lb   LMP 06/01/2017 (L

## 2018-03-02 NOTE — PROGRESS NOTES
Patient up to bathroom with assist x 1. Voided 200 ml . Patient transferred to mother/baby room 1107 per wheelchair in stable condition with baby and personal belongings. Accompanied by significant other and staff. Report given to mother/baby RN.

## 2018-03-02 NOTE — PROGRESS NOTES
Vaginal Delivery Note          Joon Sullivan Patient Status:  Inpatient    1995 MRN [id number]   Location [address] [place] [address] Attending Collette Dire, MD   Hosp Day # 0 MIRELLA HARVEY

## 2018-03-02 NOTE — L&D DELIVERY NOTE
[facility]    PATIENT'S NAME: Darrel Hammer   ATTENDING PHYSICIAN: Lorena Koroma M.D.    PATIENT ACCOUNT #: [de-identified] LOCATION:  [address] [place] [address]   MEDICAL RECORD #: [id number] YOB: 1995   ADMISSION DATE: 03/01/2018 DELIVERY DATE

## 2018-03-02 NOTE — PROGRESS NOTES
Patient admitted to mother baby unit with infant. Call light within reach.  Hugs and kiss tag applied and bonded in labor and delivery

## 2018-03-03 VITALS
TEMPERATURE: 98 F | OXYGEN SATURATION: 98 % | RESPIRATION RATE: 18 BRPM | HEART RATE: 65 BPM | SYSTOLIC BLOOD PRESSURE: 134 MMHG | HEIGHT: 69 IN | WEIGHT: 293 LBS | DIASTOLIC BLOOD PRESSURE: 75 MMHG | BODY MASS INDEX: 43.4 KG/M2

## 2018-03-03 RX ORDER — IBUPROFEN 600 MG/1
600 TABLET ORAL EVERY 6 HOURS
Qty: 30 TABLET | Refills: 0 | Status: SHIPPED | OUTPATIENT
Start: 2018-03-03 | End: 2018-03-12

## 2018-03-03 NOTE — DISCHARGE SUMMARY
[facility]  Discharge Summary    Tucker Cazares Patient Status:  Inpatient    1995 MRN [id number]   [facility] 1SW-J Attending Miladis Sims MD   Hosp Day # 2 PCP Otf [place] MD     Date of Admission: 3/1/2018    Date of Gregg Galvan Oral Tab  One tablet by mouth every day. Qty: 90 tablet Refills: 1    triamcinolone acetonide 0.1 % External Cream  MARTIN TO LEGS BID UTD  Refills: 4    PRENATAL PLUS 27-1 MG Oral Tab  TAKE 1 TABLET BY MOUTH DAILY.   Qty: 90 tablet Refills: 3

## 2018-03-03 NOTE — PLAN OF CARE
POSTPARTUM    • Long Term Goal:Experiences normal postpartum course Adequate for Discharge    • Experiences normal breast weaning course Adequate for Discharge    • Appropriate maternal -  bonding Adequate for Discharge

## 2018-03-06 ENCOUNTER — TELEPHONE (OUTPATIENT)
Dept: OBGYN UNIT | Facility: HOSPITAL | Age: 23
End: 2018-03-06

## 2018-03-06 ENCOUNTER — TELEPHONE (OUTPATIENT)
Dept: OBGYN CLINIC | Facility: CLINIC | Age: 23
End: 2018-03-06

## 2018-03-06 NOTE — TELEPHONE ENCOUNTER
PT would like a call back to discuss pain and nausea after delivery. Please call her back to discuss.

## 2018-03-06 NOTE — TELEPHONE ENCOUNTER
S/p  3/1/18. Pt reports taking Ibuprofen 600mg q 6-8 hrs for cramping. Pt states pain was severe this morning; 7/10, cramping, pelvic pain. Pt states she took 2 600mg Ibuprofen at 0930 this morning; total 1,200mg dose. Pt rates pain as 1-2 now.   Lo

## 2018-03-06 NOTE — TELEPHONE ENCOUNTER
As noted. Can prescribe stronger NSAID for her or short course of narcotic, but would need to come and  prescription.

## 2018-03-06 NOTE — TELEPHONE ENCOUNTER
Pt reports pain at level 1-2 now. Pt states she will continue taking Ibuprofen; does not want anything stronger. Pt advised to take Ibuprofen 600mg q 6 hrs or Ibuprofen 800mg q 8 hrs for pain.   Advised to call office if OTC pain meds not adequately relie

## 2018-03-12 ENCOUNTER — TELEPHONE (OUTPATIENT)
Dept: OBGYN CLINIC | Facility: CLINIC | Age: 23
End: 2018-03-12

## 2018-03-12 ENCOUNTER — MED REC SCAN ONLY (OUTPATIENT)
Dept: OBGYN CLINIC | Facility: CLINIC | Age: 23
End: 2018-03-12

## 2018-03-12 RX ORDER — IBUPROFEN 600 MG/1
600 TABLET ORAL EVERY 6 HOURS
Qty: 30 TABLET | Refills: 0 | Status: SHIPPED | OUTPATIENT
Start: 2018-03-12 | End: 2018-06-26

## 2018-03-12 NOTE — TELEPHONE ENCOUNTER
Last OV: 18  Last refill date: 3/2/18  Follow-up:  3/2/18  Next appt.: none scheduled yet   Pt requesting refill on Ibuprofen 600mg q 6 hrs; 30 tabs   Routed to Dr. Denver Sheth. Please advise.

## 2018-03-12 NOTE — TELEPHONE ENCOUNTER
Signed, completed forms faxed to [facility] at [phone]. Copy to scanning and FMLA folder.

## 2018-03-15 ENCOUNTER — TELEPHONE (OUTPATIENT)
Dept: OBGYN CLINIC | Facility: CLINIC | Age: 23
End: 2018-03-15

## 2018-03-15 NOTE — TELEPHONE ENCOUNTER
FMLA forms were sent over to be completed. Patient states that she already paid. Please call and let her know if she will be charged again. She already delivered.

## 2018-03-16 NOTE — TELEPHONE ENCOUNTER
Attending Physician statement received on 3/14/18 for completion. Attending Physician Statement had been previously completed and faxed on 3/12/18. Attending Physician Statement refaxed with clinical notes to [facility] at [phone].

## 2018-03-20 ENCOUNTER — TELEPHONE (OUTPATIENT)
Dept: OBGYN CLINIC | Facility: CLINIC | Age: 23
End: 2018-03-20

## 2018-03-20 NOTE — TELEPHONE ENCOUNTER
Received a fax from the answering service requesting for us to contact Irvin Saldana at [phone] regarding a disability claim.    The claim number is [id number]

## 2018-03-21 NOTE — TELEPHONE ENCOUNTER
Disability calling asking if there was a medical reason for patient to stop working as of 02/21/18. Patient was seen in triage on 02/21/18. She was discharged home the same day. There are no notes from MD reporting patient to stop working.  Disability paper

## 2018-04-02 ENCOUNTER — OFFICE VISIT (OUTPATIENT)
Dept: OBGYN CLINIC | Facility: CLINIC | Age: 23
End: 2018-04-02

## 2018-04-02 VITALS
WEIGHT: 288 LBS | HEIGHT: 69 IN | BODY MASS INDEX: 42.65 KG/M2 | SYSTOLIC BLOOD PRESSURE: 130 MMHG | DIASTOLIC BLOOD PRESSURE: 80 MMHG

## 2018-04-02 RX ORDER — LEVONORGESTREL AND ETHINYL ESTRADIOL 0.1-0.02MG
1 KIT ORAL DAILY
Qty: 3 PACKAGE | Refills: 1 | Status: SHIPPED | OUTPATIENT
Start: 2018-04-02 | End: 2018-11-19

## 2018-04-02 NOTE — PROGRESS NOTES
Here for postpartum visit S/P IOL for liable gestational HTN and decrease FM with  on .  Not breast feeding. Voiding w/o issues and BM's back to normal.  Lochia stopped one week ago. Infant doing fine and has gained 2 pounds since delivery.

## 2018-05-22 ENCOUNTER — TELEPHONE (OUTPATIENT)
Dept: OBGYN CLINIC | Facility: CLINIC | Age: 23
End: 2018-05-22

## 2018-05-22 NOTE — TELEPHONE ENCOUNTER
Pt last seen 4/2/18 for PP appt. Pt started Lutera last month; currently in 2nd pack. Pt reports bleeding in week 3 and 4 of last pack; now in 3rd week and bleeding. Pt reports no missed pills; takes same time everyday.   Advised pt to continue to [facility]

## 2018-05-22 NOTE — TELEPHONE ENCOUNTER
Called pt. Options:  Continue same pill for one additional cycle or change to different pill. Wishes to try this pill for one more month.

## 2018-06-13 ENCOUNTER — TELEPHONE (OUTPATIENT)
Dept: OBGYN CLINIC | Facility: CLINIC | Age: 23
End: 2018-06-13

## 2018-06-13 NOTE — TELEPHONE ENCOUNTER
Refill request received from [facility] for Flagyl 500mg PO BID x 7 days. Left message on answering machine to call back.

## 2018-06-26 ENCOUNTER — OFFICE VISIT (OUTPATIENT)
Dept: INTERNAL MEDICINE CLINIC | Facility: CLINIC | Age: 23
End: 2018-06-26

## 2018-06-26 VITALS
SYSTOLIC BLOOD PRESSURE: 138 MMHG | WEIGHT: 289 LBS | RESPIRATION RATE: 12 BRPM | BODY MASS INDEX: 42.8 KG/M2 | HEIGHT: 69 IN | DIASTOLIC BLOOD PRESSURE: 84 MMHG | HEART RATE: 72 BPM | TEMPERATURE: 98 F

## 2018-06-26 DIAGNOSIS — R21 RASH: Primary | ICD-10-CM

## 2018-06-26 PROCEDURE — 99213 OFFICE O/P EST LOW 20 MIN: CPT | Performed by: INTERNAL MEDICINE

## 2018-06-26 NOTE — PROGRESS NOTES
[facility]    CHIEF COMPLAINT:  Patient presents with:  Derm Problem: multiple red bumps on both lower legs. Bumps are painful. First noticed on Friday. HISTORY OF PRESENT ILLNESS:  Complains of red bumps on her legs for 4 days.  Both l MCHC 34.2 31.0 - 37.0 g/dL   RDW 13.7 11.5 - 16.0 %   RDW-SD 45.2 35.1 - 46.3 fL   -T PALLIDUM SCREENING CASCADE   Result Value Ref Range   Treponemal Antibodies Nonreactive  Nonreactive    -COMP METABOLIC PANEL (14)   Result Value Ref Range   Glucose 11 RBC 3.67 (L) 3.80 - 5.10 x10(6)uL   HGB 11.6 (L) 12.0 - 16.0 g/dL   HCT 34.6 34.0 - 50.0 %   .0 (L) 150.0 - 450.0 10(3)uL   MCV 94.3 81.0 - 100.0 fL   MCH 31.6 27.0 - 33.2 pg   MCHC 33.5 31.0 - 37.0 g/dL   RDW 13.8 11.5 - 16.0 %   RDW-SD 47.6 (H)

## 2018-07-05 RX ORDER — IBUPROFEN 600 MG/1
TABLET ORAL
Qty: 30 TABLET | Refills: 0 | OUTPATIENT
Start: 2018-07-05

## 2018-07-05 NOTE — TELEPHONE ENCOUNTER
Patient requesting refill for Ibuprofen.   Pharmacy notified to have patient contact office if she requested refill  Ibuprofen was ordered in March for cramping after delivery

## 2018-07-19 ENCOUNTER — TELEPHONE (OUTPATIENT)
Dept: OBGYN CLINIC | Facility: CLINIC | Age: 23
End: 2018-07-19

## 2018-07-19 RX ORDER — METRONIDAZOLE 500 MG/1
500 TABLET ORAL 2 TIMES DAILY
Qty: 14 TABLET | Refills: 0 | Status: SHIPPED | OUTPATIENT
Start: 2018-07-19 | End: 2018-10-06 | Stop reason: ALTCHOICE

## 2018-07-19 NOTE — TELEPHONE ENCOUNTER
19 y/o patient called w/ discharge and foul odor. She states she was trying to wait until she saw Dr. Lori Marinelli but now it has worsened. She has had BV in the past. She has annual scheduled for 07/28/18.   Last OV date: 04/02/18  Recent Test/Labs: N/A   Recomm

## 2018-07-28 ENCOUNTER — OFFICE VISIT (OUTPATIENT)
Dept: OBGYN CLINIC | Facility: CLINIC | Age: 23
End: 2018-07-28
Payer: COMMERCIAL

## 2018-07-28 VITALS
SYSTOLIC BLOOD PRESSURE: 122 MMHG | HEIGHT: 68.5 IN | BODY MASS INDEX: 43.3 KG/M2 | DIASTOLIC BLOOD PRESSURE: 78 MMHG | WEIGHT: 289 LBS

## 2018-07-28 DIAGNOSIS — Z01.419 ENCOUNTER FOR GYNECOLOGICAL EXAMINATION WITHOUT ABNORMAL FINDING: Primary | ICD-10-CM

## 2018-07-28 PROCEDURE — 99395 PREV VISIT EST AGE 18-39: CPT | Performed by: OBSTETRICS & GYNECOLOGY

## 2018-07-28 RX ORDER — LEVONORGESTREL AND ETHINYL ESTRADIOL 0.1-0.02MG
1 KIT ORAL DAILY
Qty: 3 PACKAGE | Refills: 3 | Status: SHIPPED | OUTPATIENT
Start: 2018-07-28 | End: 2018-10-06

## 2018-07-28 NOTE — PROGRESS NOTES
Patient ID:   Vikas Duffy  is a 20year old female [id number]        HPI:     Here for general gyn exam. Last seen April 2018 for postpartum visit. New medical/surgical hx:  None. Patient's last menstrual period was 07/19/2018 (exact date).   Mense (LUTERA) 0.1-20 MG-MCG Oral Tab Take 1 tablet by mouth daily. Disp: 3 Package Rfl: 1   ValACYclovir HCl 1 G Oral Tab One tablet by mouth every day.  (Patient taking differently: One tablet by mouth when needed ) Disp: 90 tablet Rfl: 1   triamcinolone aceton new pack. Wishes to stay with current pill regiment and will use PPI. Call if no better. Otherwise, see in one year.

## 2018-09-07 ENCOUNTER — TELEPHONE (OUTPATIENT)
Dept: OBGYN CLINIC | Facility: CLINIC | Age: 23
End: 2018-09-07

## 2018-09-07 RX ORDER — FLUCONAZOLE 150 MG/1
TABLET ORAL
Qty: 2 TABLET | Refills: 0 | Status: SHIPPED | OUTPATIENT
Start: 2018-09-07 | End: 2018-10-06

## 2018-09-07 NOTE — TELEPHONE ENCOUNTER
19 y/o called c/o vaginal itching, discharge, and irritation. She states it started on Wednesday (09/05/18). Denies any other s/s. Last OV date: 07/28/18  Recent Test/Labs: N/A   Recommendations: Diflucan sent to pharmacy per protocol.  Advised patient to

## 2018-09-21 ENCOUNTER — TELEPHONE (OUTPATIENT)
Dept: OBGYN CLINIC | Facility: CLINIC | Age: 23
End: 2018-09-21

## 2018-09-21 NOTE — TELEPHONE ENCOUNTER
Received refill request from [facility] for Cynthia Marquez. Last OV 07/28/18. Last Refill: 07/28/18. Contacted pharmacy as we just sent this over in July w/ a year supply. They stated that they have enough refills. No further questions or concerns.

## 2018-10-06 ENCOUNTER — OFFICE VISIT (OUTPATIENT)
Dept: INTERNAL MEDICINE CLINIC | Facility: CLINIC | Age: 23
End: 2018-10-06
Payer: COMMERCIAL

## 2018-10-06 VITALS
RESPIRATION RATE: 12 BRPM | WEIGHT: 287.13 LBS | HEART RATE: 72 BPM | SYSTOLIC BLOOD PRESSURE: 128 MMHG | BODY MASS INDEX: 43.02 KG/M2 | HEIGHT: 68.5 IN | DIASTOLIC BLOOD PRESSURE: 76 MMHG | TEMPERATURE: 98 F

## 2018-10-06 DIAGNOSIS — E66.01 MORBID OBESITY WITH BMI OF 40.0-44.9, ADULT (HCC): ICD-10-CM

## 2018-10-06 DIAGNOSIS — R51.9 SCALP TENDERNESS: ICD-10-CM

## 2018-10-06 DIAGNOSIS — L65.9 HAIR LOSS: ICD-10-CM

## 2018-10-06 DIAGNOSIS — Z00.00 PHYSICAL EXAM, ANNUAL: Primary | ICD-10-CM

## 2018-10-06 DIAGNOSIS — R51.9 GENERALIZED HEADACHES: ICD-10-CM

## 2018-10-06 DIAGNOSIS — I83.93 ASYMPTOMATIC VARICOSE VEINS OF LOWER EXTREMITY, BILATERAL: ICD-10-CM

## 2018-10-06 PROBLEM — Z34.90 PREGNANCY: Status: RESOLVED | Noted: 2018-02-21 | Resolved: 2018-10-06

## 2018-10-06 PROBLEM — O26.893 PREGNANCY HEADACHE IN THIRD TRIMESTER: Status: RESOLVED | Noted: 2018-02-20 | Resolved: 2018-10-06

## 2018-10-06 PROBLEM — Z87.59 HISTORY OF GESTATIONAL HYPERTENSION: Status: RESOLVED | Noted: 2018-02-20 | Resolved: 2018-10-06

## 2018-10-06 PROBLEM — Z34.90 PREGNANCY (HCC): Status: RESOLVED | Noted: 2018-02-21 | Resolved: 2018-10-06

## 2018-10-06 PROBLEM — Z34.80 PRENATAL CARE, SUBSEQUENT PREGNANCY: Status: RESOLVED | Noted: 2017-08-07 | Resolved: 2018-10-06

## 2018-10-06 PROBLEM — O26.893 PREGNANCY HEADACHE IN THIRD TRIMESTER (HCC): Status: RESOLVED | Noted: 2018-02-20 | Resolved: 2018-10-06

## 2018-10-06 PROBLEM — Z34.80 PRENATAL CARE, SUBSEQUENT PREGNANCY (HCC): Status: RESOLVED | Noted: 2017-08-07 | Resolved: 2018-10-06

## 2018-10-06 PROCEDURE — 99213 OFFICE O/P EST LOW 20 MIN: CPT | Performed by: INTERNAL MEDICINE

## 2018-10-06 PROCEDURE — 99395 PREV VISIT EST AGE 18-39: CPT | Performed by: INTERNAL MEDICINE

## 2018-10-06 RX ORDER — IBUPROFEN 600 MG/1
600 TABLET ORAL EVERY 6 HOURS PRN
Refills: 0 | COMMUNITY
Start: 2018-06-26 | End: 2019-04-22 | Stop reason: ALTCHOICE

## 2018-10-06 NOTE — PROGRESS NOTES
[facility]    CHIEF COMPLAINT: Patient presents with:  Routine Physical: sees Dr. Rosa price Dear  Imm/Inj: declines flu shot.  will get at work        HPI:   Jayson Hernandez is a 20year old female who presents for a complete physical exam. She ha : 289 lb  06/26/18 : 289 lb  04/02/18 : 288 lb  03/01/18 : 294 lb  02/27/18 : 295 lb    Body mass index is 43.02 kg/m². Current Outpatient Medications:  ibuprofen 600 MG Oral Tab Take 600 mg by mouth every 6 (six) hours as needed.    Disp:  Rfl: 0 oz    Drug use: No    Occ: pharmacy tech. : yes. Children: yes. Exercise: walking.   Diet: watches minimally     REVIEW OF SYSTEMS:   GENERAL: feels well otherwise  SKIN: denies any unusual skin lesions  EYES:denies blurred vision or double vision Date/Time     (H) 03/01/2018 07:39 AM     03/01/2018 07:39 AM    K 3.6 03/01/2018 07:39 AM     03/01/2018 07:39 AM    CO2 21.0 (L) 03/01/2018 07:39 AM    CREATSERUM 0.65 03/01/2018 07:39 AM    CA 8.8 03/01/2018 07:39 AM    ALB 2.5 (L) 03 loss  Labs ordered. Monitor. If worsening may need to see derm. She will let me know. The patient is asked to return prn for symptoms if they persist or worsen. Otherwise see me in a year for a cpx.     Miriam Elliott MD

## 2018-10-09 ENCOUNTER — LABORATORY ENCOUNTER (OUTPATIENT)
Dept: LAB | Age: 23
End: 2018-10-09
Attending: INTERNAL MEDICINE
Payer: COMMERCIAL

## 2018-10-09 DIAGNOSIS — R51.9 GENERALIZED HEADACHES: ICD-10-CM

## 2018-10-09 DIAGNOSIS — Z00.00 PHYSICAL EXAM, ANNUAL: ICD-10-CM

## 2018-10-09 PROCEDURE — 80061 LIPID PANEL: CPT | Performed by: INTERNAL MEDICINE

## 2018-10-09 PROCEDURE — 36415 COLL VENOUS BLD VENIPUNCTURE: CPT | Performed by: INTERNAL MEDICINE

## 2018-10-09 PROCEDURE — 82306 VITAMIN D 25 HYDROXY: CPT | Performed by: INTERNAL MEDICINE

## 2018-10-09 PROCEDURE — 80050 GENERAL HEALTH PANEL: CPT | Performed by: INTERNAL MEDICINE

## 2018-10-09 PROCEDURE — 82607 VITAMIN B-12: CPT | Performed by: INTERNAL MEDICINE

## 2018-10-09 PROCEDURE — 82746 ASSAY OF FOLIC ACID SERUM: CPT | Performed by: INTERNAL MEDICINE

## 2018-10-22 ENCOUNTER — OFFICE VISIT (OUTPATIENT)
Dept: INTERNAL MEDICINE CLINIC | Facility: CLINIC | Age: 23
End: 2018-10-22
Payer: COMMERCIAL

## 2018-10-22 ENCOUNTER — TELEPHONE (OUTPATIENT)
Dept: OBGYN CLINIC | Facility: CLINIC | Age: 23
End: 2018-10-22

## 2018-10-22 VITALS
HEIGHT: 69 IN | BODY MASS INDEX: 42.36 KG/M2 | WEIGHT: 286 LBS | SYSTOLIC BLOOD PRESSURE: 108 MMHG | DIASTOLIC BLOOD PRESSURE: 76 MMHG | HEART RATE: 88 BPM | RESPIRATION RATE: 16 BRPM

## 2018-10-22 DIAGNOSIS — G43.909 MIGRAINE WITHOUT STATUS MIGRAINOSUS, NOT INTRACTABLE, UNSPECIFIED MIGRAINE TYPE: ICD-10-CM

## 2018-10-22 DIAGNOSIS — Z51.81 THERAPEUTIC DRUG MONITORING: Primary | ICD-10-CM

## 2018-10-22 PROCEDURE — 99203 OFFICE O/P NEW LOW 30 MIN: CPT | Performed by: NURSE PRACTITIONER

## 2018-10-22 PROCEDURE — 93000 ELECTROCARDIOGRAM COMPLETE: CPT | Performed by: NURSE PRACTITIONER

## 2018-10-22 RX ORDER — TOPIRAMATE 25 MG/1
25 TABLET ORAL 2 TIMES DAILY
Qty: 60 TABLET | Refills: 3 | Status: SHIPPED | OUTPATIENT
Start: 2018-10-22 | End: 2018-10-23 | Stop reason: DRUGHIGH

## 2018-10-22 NOTE — PROGRESS NOTES
HISTORY OF PRESENT ILLNESS  Patient presents with:  Weight Problem: dr mckee referral. no weight loss meds. no successful diets.  walking for exercise 1-2 times weekly    Nida Justice is a 20year old female new to our office today for initiation of medi kids- 3 toddlers  Support: yes  Tobacco use: none  ETOH use: none  Supplements: none  Stress level: 4-5/10  Sleep hours and integrity: 5-10 Hours per night    MEDICAL HISTORY  PMH reviewed:   Cardiac disorders: preg induced HTN   Diabetes: negative  Thyroi 10 10/09/2018    BUNCREA 15.4 10/09/2018    CREATSERUM 0.65 10/09/2018    ANIONGAP 8 10/09/2018     10/09/2016    GFRNAA 124 10/09/2018    GFRAA 143 10/09/2018    CA 8.9 10/09/2018    OSMOCALC 285 10/09/2018    ALKPHO 73 10/09/2018    AST 14 (L) 10/ migraine type  Plan: topiramate 25 MG Oral Tab    Initial Weight Data and Goal Weight Loss:  Weight Calculations  Initial Weight: 286 lbs  Initial Weight Date: 10/22/18  Today's Weight: 286 lbs  5% Goal: 14.3  10% Goal: 28.6  Total Weight Loss: 0 lbs  Init weight loss plan including attention to nutrition, exercise and behavior/stress management for success. See patient instruction below for more details.   · Schedule appointment with dietitian  -low carb high protein  -portion control  -Limit carbohydrates sleep  7. Try to decrease stress in life     Please download apps:  1.  \"My Fitness Pal\" (other option is Lose it)) to help you to monitor daily dietary intake and you will be able to see if you are eating the right amount of calories, protein, carbs

## 2018-10-22 NOTE — TELEPHONE ENCOUNTER
Patient was prescribed topiramate 25 mg take once, twice daily for weight control. Her pharmacist says it will interfere with her birth control. Please call to discuss.

## 2018-10-22 NOTE — TELEPHONE ENCOUNTER
Pt last seen 7/28/18 for annual.  Pt currently on Lutera for contraception. Pt had appt in weight loss clinic today andtarted on Topamax 25mg BID for cravings and weight loss.     Pt advised by weight loss MD to check with Gyn on possible contraindication

## 2018-10-22 NOTE — TELEPHONE ENCOUNTER
Patient informed of Dr. Brittany Riddle recommendations. Verbalized understanding. No further questions or concerns. recent trauma

## 2018-10-22 NOTE — PATIENT INSTRUCTIONS
PLAN:  Continue with medications: Topamax: take 1 tablet before dinner for the first week (to decrease side effects) and than the second week--> increase to 1 tablet in the morning and 1 tablet before dinner (2 tablets per day)   Follow up with me in 1 mon exercise/activity which takes 7 minutes of your day and that you can do at home! 3. \"Calm\" or \"Headspace\" which helps with mindfulness, meditation, clarity, sleep, and sudeep to your daily life.

## 2018-10-23 ENCOUNTER — OFFICE VISIT (OUTPATIENT)
Dept: NEUROLOGY | Facility: CLINIC | Age: 23
End: 2018-10-23
Payer: COMMERCIAL

## 2018-10-23 ENCOUNTER — OFFICE VISIT (OUTPATIENT)
Dept: INTERNAL MEDICINE CLINIC | Facility: CLINIC | Age: 23
End: 2018-10-23
Payer: COMMERCIAL

## 2018-10-23 VITALS
RESPIRATION RATE: 16 BRPM | WEIGHT: 286 LBS | DIASTOLIC BLOOD PRESSURE: 72 MMHG | BODY MASS INDEX: 42.36 KG/M2 | HEART RATE: 82 BPM | HEIGHT: 69 IN | SYSTOLIC BLOOD PRESSURE: 118 MMHG

## 2018-10-23 VITALS — HEIGHT: 69 IN | WEIGHT: 286 LBS | BODY MASS INDEX: 42.36 KG/M2

## 2018-10-23 DIAGNOSIS — R51.9 CHRONIC DAILY HEADACHE: Primary | ICD-10-CM

## 2018-10-23 DIAGNOSIS — G43.019 MIGRAINE WITHOUT AURA, INTRACTABLE, WITHOUT STATUS MIGRAINOSUS: ICD-10-CM

## 2018-10-23 DIAGNOSIS — E66.01 CLASS 3 SEVERE OBESITY DUE TO EXCESS CALORIES WITH BODY MASS INDEX (BMI) OF 40.0 TO 44.9 IN ADULT, UNSPECIFIED WHETHER SERIOUS COMORBIDITY PRESENT (HCC): Primary | ICD-10-CM

## 2018-10-23 PROCEDURE — 99244 OFF/OP CNSLTJ NEW/EST MOD 40: CPT | Performed by: OTHER

## 2018-10-23 PROCEDURE — 97802 MEDICAL NUTRITION INDIV IN: CPT | Performed by: DIETITIAN, REGISTERED

## 2018-10-23 RX ORDER — VALACYCLOVIR HYDROCHLORIDE 1 G/1
TABLET, FILM COATED ORAL AS NEEDED
COMMUNITY
End: 2019-09-18 | Stop reason: ALTCHOICE

## 2018-10-23 RX ORDER — METHYLPREDNISOLONE 4 MG/1
TABLET ORAL
Qty: 1 PACKAGE | Refills: 0 | Status: SHIPPED | OUTPATIENT
Start: 2018-10-23 | End: 2018-11-19

## 2018-10-23 RX ORDER — RIZATRIPTAN BENZOATE 10 MG/1
10 TABLET ORAL AS NEEDED
Qty: 12 TABLET | Refills: 2 | Status: SHIPPED | OUTPATIENT
Start: 2018-10-23 | End: 2018-11-22

## 2018-10-23 RX ORDER — TOPIRAMATE 25 MG/1
TABLET ORAL
Qty: 120 TABLET | Refills: 2 | Status: SHIPPED | OUTPATIENT
Start: 2018-10-23 | End: 2018-11-19

## 2018-10-23 NOTE — PATIENT INSTRUCTIONS
Please start taking topiramate (Topamax) as follows:  start at 25 mg nightly x1 week, then increase to 50 mg nightly x1 week, then 25 mg AM / 50 mg PM x1 week, then 50 mg bid; advised of potential side effects, including but not limited to, cognitive slowi months.  protocol for controlled substances; Written prescriptions:    • EFFECTIVE April 1, 2017 PATIENTS MUST  THEIR OWN NARCOTIC PRESCRIPTIONS. • Written prescriptions must be picked up in office.   • Please allow the office 2-3 business weeks for FMLA, disability or other complex paperwork to be processed. • Other paperwork may require up to 5 business days for completion. • Fill in and sign any sections that are to be completed by the patient prior to submitting forms to office staff.

## 2018-10-23 NOTE — PROGRESS NOTES
INITIAL OUTPATIENT NUTRITION CONSULTATION    Nutrition Assessment    Medical Diagnosis: Obesity    Physical Findings: none reported    Client Age and Gender: 20year old    Marital Status and Occupation:  with 3 kids (3 years, 2 years, 8 months) disease.          AST   Date Value Ref Range Status   10/09/2018 14 (L) 15 - 41 U/L Final   03/31/2014 9 (L) 15 - 41 U/L Final     ALT   Date Value Ref Range Status   03/31/2014 21 14 - 54 U/L Final     Alt   Date Value Ref Range Status   10/09/2018 21 14 - fitness videos and was encouraged to do this more consistently (she did sweat/increase heart rate)    Food Journal: not currently, was encouraged. Spent 45 minutes in consultation with the patient.       Nutrition Intervention/Education:  Comprehensive

## 2018-10-23 NOTE — H&P
[facility] New Patient / Consult Visit    Lennox Pina is a 20year old female.                          Referring MD: Sherley Mendoza    Patient presents with:  Neurologic Problem: C/O of 3-4 headaches a week      HPI:    Brant Cintron started taking this yet.            Otherwise, patient denies any recent weight change, fevers, chills, nausea, double vision/ blurry vision / loss of vision, chest pain, palpitations, shortness of breath, rashes, joint pains, bowel / bladder incontinence o hours- ONLY 2 IN 24 HOUR PERIOD MAX Disp: 12 tablet Rfl: 2   topiramate 25 MG Oral Tab start at 25 mg nightly x1 week, then increase to 50 mg nightly x1 week, then 25 mg AM / 50 mg PM x1 week, then 50 mg bid; Disp: 120 tablet Rfl: 2   methylPREDNISolone (M without nystagmus  Trigeminal:   Facial sensation:intact to light touch bilaterally  Facial:   Smile symmetric, eyebrow raise symmetric  Vestibulocochlear:   Hearing: normal bilaterally  Glossopharyngeal/Vagus:   Palate elevates symmetrically with midline Glucose      70 - 99 mg/dL 94   Sodium      136 - 144 mmol/L 138   Potassium      3.6 - 5.1 mmol/L 3.8   Chloride      101 - 111 mmol/L 107   Carbon Dioxide, Total      22.0 - 32.0 mmol/L 23.0   ANION GAP      0 - 18 mmol/L 8   BUN      8 - 20 mg/dL 10 several months. In order to break current headache cycle, will prescribe oral steroid burst, with Medrol Dosepak.     In addition, for abortive therapy, will start on rizatriptan (Maxalt), 10 mg -  Advised to take within first 30 minutes of symptoms star Chronic daily headache  (primary encounter diagnosis)  Plan: Rizatriptan Benzoate 10 MG Oral Tab, topiramate        25 MG Oral Tab, methylPREDNISolone (MEDROL) 4         MG Oral Tablet Therapy Pack        As noted above     (G43.019) Migraine without aura,

## 2018-10-24 ENCOUNTER — OFFICE VISIT (OUTPATIENT)
Dept: FAMILY MEDICINE CLINIC | Facility: CLINIC | Age: 23
End: 2018-10-24
Payer: COMMERCIAL

## 2018-10-24 VITALS
TEMPERATURE: 98 F | BODY MASS INDEX: 42.51 KG/M2 | HEIGHT: 69 IN | HEART RATE: 77 BPM | OXYGEN SATURATION: 98 % | WEIGHT: 287 LBS | SYSTOLIC BLOOD PRESSURE: 118 MMHG | DIASTOLIC BLOOD PRESSURE: 72 MMHG

## 2018-10-24 DIAGNOSIS — J02.9 SORE THROAT: Primary | ICD-10-CM

## 2018-10-24 PROCEDURE — 87880 STREP A ASSAY W/OPTIC: CPT | Performed by: FAMILY MEDICINE

## 2018-10-24 PROCEDURE — 87081 CULTURE SCREEN ONLY: CPT | Performed by: FAMILY MEDICINE

## 2018-10-24 PROCEDURE — 99213 OFFICE O/P EST LOW 20 MIN: CPT | Performed by: FAMILY MEDICINE

## 2018-10-25 NOTE — PROGRESS NOTES
CHIEF COMPLAINT:   Patient presents with:  Sore Throat: x 3 dys also c/o blister on bottom of foot that popped x 1 wk        HPI:   Agustin Kumar is a 20year old female presents to clinic with complaint of sore throat. Patient has had for 3 days.  Lydia Win Pregnancy-induced hypertension     Hx PIH w/ 1st two pregnancies, IOL @ 39.4 and 36.6 wks   • S/P tonsillectomy       Social History:  Social History    Tobacco Use      Smoking status: Former Smoker        Packs/day: 0.50        Years: 3.00        Pack ye background (yes/no) yes Yes/No    Kit Lot # [id number] Numeric    Kit Expiration Date 2/29/20 Date         ASSESSMENT AND PLAN:   Sore throat  (primary encounter diagnosis)   Skin irritation right foot.      Orders Placed This Encounter      Rapid Strep

## 2018-10-25 NOTE — PATIENT INSTRUCTIONS
Most viral illnesses get worse for the first 3-5 days, then plateau and improve gradually over the next 3-5 days. Monitor symptoms for now. Use otc meds for comfort as needed--  Ibuprofen for pain  Warm saltwater gargles.   Throat lozenges  We will cont

## 2018-11-19 ENCOUNTER — OFFICE VISIT (OUTPATIENT)
Dept: INTERNAL MEDICINE CLINIC | Facility: CLINIC | Age: 23
End: 2018-11-19
Payer: COMMERCIAL

## 2018-11-19 VITALS
DIASTOLIC BLOOD PRESSURE: 76 MMHG | SYSTOLIC BLOOD PRESSURE: 126 MMHG | WEIGHT: 276 LBS | BODY MASS INDEX: 40.88 KG/M2 | RESPIRATION RATE: 16 BRPM | HEART RATE: 80 BPM | HEIGHT: 69 IN

## 2018-11-19 DIAGNOSIS — G43.909 MIGRAINE WITHOUT STATUS MIGRAINOSUS, NOT INTRACTABLE, UNSPECIFIED MIGRAINE TYPE: ICD-10-CM

## 2018-11-19 DIAGNOSIS — G43.909 MIGRAINE WITHOUT STATUS MIGRAINOSUS, NOT INTRACTABLE, UNSPECIFIED MIGRAINE TYPE: Primary | ICD-10-CM

## 2018-11-19 DIAGNOSIS — Z51.81 THERAPEUTIC DRUG MONITORING: Primary | ICD-10-CM

## 2018-11-19 PROCEDURE — 99214 OFFICE O/P EST MOD 30 MIN: CPT | Performed by: NURSE PRACTITIONER

## 2018-11-19 RX ORDER — LEVONORGESTREL AND ETHINYL ESTRADIOL 0.1-0.02MG
KIT ORAL
COMMUNITY
Start: 2018-06-22 | End: 2018-11-26 | Stop reason: SINTOL

## 2018-11-19 RX ORDER — TOPIRAMATE 50 MG/1
50 TABLET, FILM COATED ORAL 2 TIMES DAILY
COMMUNITY
End: 2018-11-19

## 2018-11-19 NOTE — PROGRESS NOTES
HISTORY OF PRESENT ILLNESS  Patient presents with:  Weight Check: lost 10 pounds    Lui Vela is a 20year old female new here for follow up with medical weight loss program for the treatment of overweight, obesity, or morbid obesity.  Patient has lo Chips, nut, candy  Water: adequate   Soda: 1 regular coke  Juice: none  Coffee/Tea: 1-2 coffee per day (regualr sugar 2 tsp, [facility])      Sweet/ Salty tooth: salty  Portion: medium/large   Eats 3 meals per day: no (will sometimes skips breakfast) breathing non labored  CARDIO: RRR without murmur, normal S1 and S2 without clicks or gallops, no pedal edema.    NEURO: Oriented times three, full ROM of bilateral UE/LE  PSYCH: pleasant, cooperative, normal mood and affect    Lab Results   Component Value 40.0-44.9, adult ([facility]    (G43.909) Migraine without status migrainosus, not intractable, unspecified migraine type    Initial Weight Data and Goal Weight Loss:  Weight Calculations  Initial Weight: 286 lbs  Initial Weight Date: 10/22/18  Today's Weight: 2 coffee drinks, limit alcohol)  - Do not eat late at night  · FITTE: ACSM recommendations (150-300 minutes/ week in active weight loss)   · Discussed the role of sleep and stress in weight management  · Weight Loss Contract reviewed and signed today 10/21/2 INPUT> Look at nutrition section-- \"nutrients\" and it will break down your macros for the day (ie. Protein, carbs, fibers, sugars and fats). Try to stay within these numbers daily     2.  \"7 minute workout\" to help with exercise/activity which takes 7 m

## 2018-11-19 NOTE — TELEPHONE ENCOUNTER
Medication: topiramate 50 mg BID    Date of last refill: 10/23/18  (#120/2)  [place]    Last office visit: 10/23/2018  Due back to clinic per last office note:  12/2018  Date next office visit scheduled:    Future Giorgi (Topamax)–will start as follows - start at 25 mg nightly x1 week, then increase to 50 mg nightly x1 week, then 25 mg AM / 50 mg PM x1 week, then 50 mg bid; advised of potential side effects, including but not limited to, cognitive slowing / word finding di

## 2018-11-19 NOTE — PATIENT INSTRUCTIONS
Keep up the great work!!  #10 lbs of weight loss!     PLAN:  Continue with medications: topamax 50mg (1 tablet 2 times per day)  [place] yogurt - Siggi (autumn)  Follow up with me in 1 month  Schedule follow up appointments:  Dietitian/nutritionist      Please \"Headspace\" which helps with mindfulness, meditation, clarity, sleep, and sudeep to your daily life.

## 2018-11-20 ENCOUNTER — TELEPHONE (OUTPATIENT)
Dept: OBGYN CLINIC | Facility: CLINIC | Age: 23
End: 2018-11-20

## 2018-11-20 NOTE — TELEPHONE ENCOUNTER
21 y/o called regarding irregular bleeding on OCP. She states her period comes on 3rd week (Monday) and stops by Friday and then comes the following Monday again. She has been taking OCP since 04/2018.  Patient is taking at the same time everyday and hasn't

## 2018-11-20 NOTE — TELEPHONE ENCOUNTER
Pt is having some trouble with her birthcontrol  Pt states she takes her pill regularly and at the same time  Pt states she takes the first 2 weeks of pills and when she starts the 3rd week of pills she will start her cycle  That will last about 5 days  Th

## 2018-11-26 DIAGNOSIS — G43.909 MIGRAINE WITHOUT STATUS MIGRAINOSUS, NOT INTRACTABLE, UNSPECIFIED MIGRAINE TYPE: ICD-10-CM

## 2018-11-26 RX ORDER — TOPIRAMATE 50 MG/1
50 TABLET, FILM COATED ORAL 2 TIMES DAILY
Qty: 60 TABLET | Refills: 1 | Status: SHIPPED | OUTPATIENT
Start: 2018-11-26 | End: 2018-11-26

## 2018-11-26 RX ORDER — TOPIRAMATE 50 MG/1
50 TABLET, FILM COATED ORAL 2 TIMES DAILY
Qty: 180 TABLET | Refills: 0 | Status: SHIPPED | OUTPATIENT
Start: 2018-11-26 | End: 2019-02-18

## 2018-11-26 RX ORDER — NORETHINDRONE ACETATE AND ETHINYL ESTRADIOL 1; .02 MG/1; MG/1
1 TABLET ORAL DAILY
Qty: 3 PACKAGE | Refills: 3 | Status: SHIPPED | OUTPATIENT
Start: 2018-11-26 | End: 2019-04-22 | Stop reason: ALTCHOICE

## 2018-11-26 NOTE — TELEPHONE ENCOUNTER
Returned pt's call. She states she spoke with Dr. Artem Avery and that she has had her questions answered.

## 2018-11-26 NOTE — TELEPHONE ENCOUNTER
Medication: topiramate 50 mg BID     Date of last refill: 10/23/18  (#120/2)  [place]     Last office visit: 10/23/2018  Due back to clinic per last office note:  12/2018  Date next office visit scheduled: topiramate (Topamax)–will start as follows - start at 25 mg nightly x1 week, then increase to 50 mg nightly x1 week, then 25 mg AM / 50 mg PM x1 week, then 50 mg bid; advised of potential side effects, including but not limited to, cognitive slowing / word

## 2018-11-26 NOTE — TELEPHONE ENCOUNTER
Late BTB on current pill for last two months. Now on withdrawal week. Will change pill to Microgestin 1/20.

## 2018-12-10 RX ORDER — FLUCONAZOLE 150 MG/1
TABLET ORAL
Qty: 2 TABLET | Refills: 0 | OUTPATIENT
Start: 2018-12-10

## 2018-12-18 ENCOUNTER — OFFICE VISIT (OUTPATIENT)
Dept: NEUROLOGY | Facility: CLINIC | Age: 23
End: 2018-12-18
Payer: COMMERCIAL

## 2018-12-18 VITALS
SYSTOLIC BLOOD PRESSURE: 126 MMHG | BODY MASS INDEX: 40.88 KG/M2 | HEART RATE: 82 BPM | HEIGHT: 69 IN | RESPIRATION RATE: 16 BRPM | DIASTOLIC BLOOD PRESSURE: 72 MMHG | WEIGHT: 276 LBS

## 2018-12-18 DIAGNOSIS — G43.909 MIGRAINE WITHOUT STATUS MIGRAINOSUS, NOT INTRACTABLE, UNSPECIFIED MIGRAINE TYPE: Primary | ICD-10-CM

## 2018-12-18 PROCEDURE — 99213 OFFICE O/P EST LOW 20 MIN: CPT | Performed by: OTHER

## 2018-12-18 NOTE — PROGRESS NOTES
[facility] Progress Note    HPI  Patient presents with:  Migraine: Follow up      As per my initial H&P from 10/23/18:   \" Genia Westbrook is a 20year old, who presents for evaluation of headaches.      Patient states she has several chills, nausea, double vision/ blurry vision / loss of vision, chest pain, palpitations, shortness of breath, rashes, joint pains, bowel / bladder incontinence or mood issues. \"          Since last visit, patient was started on Topamax.   She was also kit History    Socioeconomic History      Marital status:       Spouse name: Not on file      Number of children: Not on file      Years of education: Not on file      Highest education level: Not on file    Occupational History      Occupation: pharmac Height as of this encounter: 69\". Weight as of this encounter: 276 lb.     Gen: well developed, well nourished, no acute distress  HEENT: normocephalic  Heart; normal V1/D6, regular rate and rhythm  Lungs: clear to auscultation bilaterally  Extremities: [place]  Pager [phone]  12/18/2018

## 2018-12-18 NOTE — PATIENT INSTRUCTIONS
Follow up in 2 months  Continue maxalt 10 mg PRN for abortive therapy when migraines occur  Continue Topamax at 50 mg bid   Refill policies:    • Allow 2-3 business days for refills; controlled substances may take longer.   • Contact your pharmacy at least carrier directly to determine coverage. If test is done without insurance authorization, patient may be responsible for the entire amount billed. Precertification and Prior Authorizations:   If your physician has recommended that you have a procedure

## 2018-12-20 ENCOUNTER — HOSPITAL ENCOUNTER (OUTPATIENT)
Age: 23
Discharge: HOME | End: 2018-12-20
Attending: EMERGENCY MEDICINE
Payer: COMMERCIAL

## 2018-12-20 ENCOUNTER — TELEPHONE (OUTPATIENT)
Dept: INTERNAL MEDICINE CLINIC | Facility: CLINIC | Age: 23
End: 2018-12-20

## 2018-12-20 VITALS
SYSTOLIC BLOOD PRESSURE: 148 MMHG | HEART RATE: 62 BPM | DIASTOLIC BLOOD PRESSURE: 70 MMHG | OXYGEN SATURATION: 100 % | RESPIRATION RATE: 16 BRPM | TEMPERATURE: 98 F

## 2018-12-20 DIAGNOSIS — R19.7 DIARRHEA, UNSPECIFIED TYPE: Primary | ICD-10-CM

## 2018-12-20 DIAGNOSIS — E87.6 HYPOKALEMIA: ICD-10-CM

## 2018-12-20 PROCEDURE — 87046 STOOL CULTR AEROBIC BACT EA: CPT | Performed by: EMERGENCY MEDICINE

## 2018-12-20 PROCEDURE — 87045 FECES CULTURE AEROBIC BACT: CPT | Performed by: EMERGENCY MEDICINE

## 2018-12-20 PROCEDURE — 87493 C DIFF AMPLIFIED PROBE: CPT | Performed by: EMERGENCY MEDICINE

## 2018-12-20 PROCEDURE — 99214 OFFICE O/P EST MOD 30 MIN: CPT

## 2018-12-20 PROCEDURE — 96360 HYDRATION IV INFUSION INIT: CPT

## 2018-12-20 PROCEDURE — 80047 BASIC METABLC PNL IONIZED CA: CPT

## 2018-12-20 PROCEDURE — 87427 SHIGA-LIKE TOXIN AG IA: CPT | Performed by: EMERGENCY MEDICINE

## 2018-12-20 PROCEDURE — 82272 OCCULT BLD FECES 1-3 TESTS: CPT | Performed by: EMERGENCY MEDICINE

## 2018-12-20 RX ORDER — SODIUM CHLORIDE 9 MG/ML
INJECTION, SOLUTION INTRAVENOUS ONCE
Status: COMPLETED | OUTPATIENT
Start: 2018-12-20 | End: 2018-12-20

## 2018-12-20 RX ORDER — POTASSIUM CHLORIDE 1.5 G/1.77G
20 POWDER, FOR SOLUTION ORAL DAILY
Qty: 2 PACKET | Refills: 0 | Status: SHIPPED | OUTPATIENT
Start: 2018-12-20 | End: 2018-12-22

## 2018-12-20 RX ORDER — DICYCLOMINE HCL 20 MG
20 TABLET ORAL 4 TIMES DAILY PRN
Qty: 15 TABLET | Refills: 0 | Status: SHIPPED | OUTPATIENT
Start: 2018-12-20 | End: 2019-01-08

## 2018-12-20 RX ORDER — POTASSIUM CHLORIDE 20 MEQ/1
40 TABLET, EXTENDED RELEASE ORAL ONCE
Status: COMPLETED | OUTPATIENT
Start: 2018-12-20 | End: 2018-12-20

## 2018-12-20 NOTE — ED PROVIDER NOTES
Patient Seen in: [facility]    History   Patient presents with:  Nausea/Vomiting/Diarrhea (gastrointestinal)    Stated Complaint: diarrhea x4 days    HPI    The patient is a 47-year-old female who presents emergency room wi diarrhea x4 days  Other systems are as noted in HPI. Constitutional and vital signs reviewed. All other systems reviewed and negative except as noted above.     Physical Exam     ED Triage Vitals [12/20/18 1144]   /80   Pulse 82   Resp 18   Temp BLOOD, STOOL   STOOL CULTURE W/SHIGATOXIN    Narrative: The following orders were created for panel order STOOL CULTURE W/SHIGATOXIN.   Procedure                               Abnormality         Status                     --------- mouth daily for 2 days. Qty: 2 packet Refills: 0    Dicyclomine HCl 20 MG Oral Tab  Take 1 tablet (20 mg total) by mouth 4 (four) times daily as needed.   Qty: 15 tablet Refills: 0

## 2019-01-08 ENCOUNTER — OFFICE VISIT (OUTPATIENT)
Dept: INTERNAL MEDICINE CLINIC | Facility: CLINIC | Age: 24
End: 2019-01-08
Payer: COMMERCIAL

## 2019-01-08 VITALS
WEIGHT: 264 LBS | SYSTOLIC BLOOD PRESSURE: 126 MMHG | BODY MASS INDEX: 39.1 KG/M2 | DIASTOLIC BLOOD PRESSURE: 80 MMHG | HEART RATE: 76 BPM | RESPIRATION RATE: 16 BRPM | HEIGHT: 69 IN

## 2019-01-08 DIAGNOSIS — Z51.81 THERAPEUTIC DRUG MONITORING: Primary | ICD-10-CM

## 2019-01-08 DIAGNOSIS — E66.01 CLASS 3 SEVERE OBESITY DUE TO EXCESS CALORIES WITH BODY MASS INDEX (BMI) OF 40.0 TO 44.9 IN ADULT, UNSPECIFIED WHETHER SERIOUS COMORBIDITY PRESENT (HCC): ICD-10-CM

## 2019-01-08 PROCEDURE — 99213 OFFICE O/P EST LOW 20 MIN: CPT | Performed by: NURSE PRACTITIONER

## 2019-01-08 RX ORDER — PHENTERMINE HYDROCHLORIDE 37.5 MG/1
37.5 TABLET ORAL
Qty: 30 TABLET | Refills: 0 | Status: SHIPPED | OUTPATIENT
Start: 2019-01-08 | End: 2019-02-18

## 2019-01-08 RX ORDER — AMOXICILLIN 875 MG/1
TABLET, COATED ORAL
Refills: 0 | COMMUNITY
Start: 2019-01-05 | End: 2019-02-18 | Stop reason: ALTCHOICE

## 2019-01-08 NOTE — PROGRESS NOTES
HISTORY OF PRESENT ILLNESS  Patient presents with:  Weight Check: lost 12 pounds    Lui Vela is a 20year old female new here for follow up with medical weight loss program for the treatment of overweight, obesity, or morbid obesity.  Patient has lo Lean cusine (after cooking for kids- chicken nuggets, or macandcheese)   Chips, nut, candy  Water: adequate   Soda: 1 regular coke  Juice: none  Coffee/Tea: 1-2 coffee per day (regualr sugar 2 tsp, [facility])      Sweet/ Salty tooth: salty  Portion: Results   Component Value Date    GLU 94 10/09/2018    BUN 10 10/09/2018    BUNCREA 15.4 10/09/2018    CREATSERUM 0.65 10/09/2018    ANIONGAP 8 10/09/2018     10/09/2016    GFRNAA 124 10/09/2018    GFRAA 143 10/09/2018    CA 8.9 10/09/2018    OSMOCA Calculations  Initial Weight: 286 lbs  Initial Weight Date: 10/22/18  Today's Weight: 264 lbs  5% Goal: 14.3  10% Goal: 28.6  Total Weight Loss: 22 lbs  Initial consult: 286  lbs on 10/22/2018, Down 12 Lb:  22 lbs total     Reviewed  [facility] patient contract. coffee drinks, limit alcohol)  - Do not eat late at night  · FITTE: ACSM recommendations (150-300 minutes/ week in active weight loss)   · Discussed the role of sleep and stress in weight management  · Weight Loss Contract reviewed and signed today 10/21/2 ** Daily INPUT> Look at nutrition section-- \"nutrients\" and it will break down your macros for the day (ie. Protein, carbs, fibers, sugars and fats). Try to stay within these numbers daily     2.  \"7 minute workout\" to help with exercise/activity long

## 2019-01-08 NOTE — PATIENT INSTRUCTIONS
Keep up the great work!!  #22 lbs of weight loss so far!!     PLAN:  Continue with medications: phentermine 37.5mg (cut in half for the first 4 days)   Follow up with me in 1 month  Schedule follow up appointments:  Dietitian/nutritionist      Please try t which helps with mindfulness, meditation, clarity, sleep, and sudeep to your daily life.

## 2019-02-11 ENCOUNTER — TELEPHONE (OUTPATIENT)
Dept: OBGYN CLINIC | Facility: CLINIC | Age: 24
End: 2019-02-11

## 2019-02-11 RX ORDER — METRONIDAZOLE 500 MG/1
500 TABLET ORAL 2 TIMES DAILY
Qty: 14 TABLET | Refills: 0 | Status: SHIPPED | OUTPATIENT
Start: 2019-02-11 | End: 2019-04-22 | Stop reason: ALTCHOICE

## 2019-02-11 NOTE — TELEPHONE ENCOUNTER
Patient called stating she is having cramping and discharge wants something called in wants to speak to nurse.     Thank you

## 2019-02-11 NOTE — TELEPHONE ENCOUNTER
20year old patient complaining of increased, thin, greenish vaginal dishcarge and fishy odor especially after intercourse; mild itching at times. Last OV date: 7/28/18  Recent Test/Labs: n/a   Recommendations Flagyl ordered per protocol.   Pt advised no

## 2019-02-18 ENCOUNTER — OFFICE VISIT (OUTPATIENT)
Dept: NEUROLOGY | Facility: CLINIC | Age: 24
End: 2019-02-18
Payer: COMMERCIAL

## 2019-02-18 VITALS
BODY MASS INDEX: 34 KG/M2 | DIASTOLIC BLOOD PRESSURE: 70 MMHG | WEIGHT: 233 LBS | HEART RATE: 78 BPM | SYSTOLIC BLOOD PRESSURE: 114 MMHG | RESPIRATION RATE: 16 BRPM

## 2019-02-18 DIAGNOSIS — G43.909 MIGRAINE WITHOUT STATUS MIGRAINOSUS, NOT INTRACTABLE, UNSPECIFIED MIGRAINE TYPE: ICD-10-CM

## 2019-02-18 PROCEDURE — 99213 OFFICE O/P EST LOW 20 MIN: CPT | Performed by: OTHER

## 2019-02-18 RX ORDER — TOPIRAMATE 50 MG/1
50 TABLET, FILM COATED ORAL 2 TIMES DAILY
Qty: 180 TABLET | Refills: 0 | Status: SHIPPED | OUTPATIENT
Start: 2019-02-18 | End: 2019-04-22 | Stop reason: ALTCHOICE

## 2019-02-18 RX ORDER — RIZATRIPTAN BENZOATE 10 MG/1
10 TABLET ORAL
COMMUNITY
End: 2019-02-18

## 2019-02-18 RX ORDER — RIZATRIPTAN BENZOATE 10 MG/1
10 TABLET ORAL AS NEEDED
Qty: 12 TABLET | Refills: 5 | Status: SHIPPED | OUTPATIENT
Start: 2019-02-18 | End: 2019-04-22 | Stop reason: ALTCHOICE

## 2019-02-18 NOTE — PROGRESS NOTES
[facility] Progress Note    HPI  Patient presents with:  Migraine      As per my initial H&P from 10/23/18:   \" Agustin Kumar is a 20year old, who presents for evaluation of headaches.      Patient states she has several different t double vision/ blurry vision / loss of vision, chest pain, palpitations, shortness of breath, rashes, joint pains, bowel / bladder incontinence or mood issues.  \"        Patient since last visit has been doing well overall; she states had to use rizatripta Psychiatric Father    • Hypertension Maternal Grandfather    • Diabetes Maternal Grandfather    • Breast Cancer Maternal Aunt      Social History    Socioeconomic History      Marital status:       Spouse name: Not on file      Number of children: N Resp 16   Wt 233 lb   BMI 34.41 kg/m²   Estimated body mass index is 34.41 kg/m² as calculated from the following:    Height as of 1/8/19: 69\". Weight as of this encounter: 233 lb.     Gen: well developed, well nourished, no acute distress  HEENT: norm Rizatriptan Benzoate        10 MG Oral Tab        As noted above     Return in about 3 months (around 5/18/2019).     Lyndon Davila MD, Neurology  [facility]  Pager [phone]  2/18/2019

## 2019-02-18 NOTE — PATIENT INSTRUCTIONS
Please follow up in ~ 3 months     Refill policies:    • Allow 2-3 business days for refills; controlled substances may take longer.   • Contact your pharmacy at least 5 days prior to running out of medication and have them send an electronic request or sub notified you that the test has been approved by your insurer. Depending on your insurance carrier, approval may take 3-10 days. It is highly recommended patients contact their insurance carrier directly to determine coverage.   If test is done without insu

## 2019-03-14 ENCOUNTER — TELEPHONE (OUTPATIENT)
Dept: NEUROLOGY | Facility: CLINIC | Age: 24
End: 2019-03-14

## 2019-03-14 ENCOUNTER — TELEPHONE (OUTPATIENT)
Dept: OBGYN CLINIC | Facility: CLINIC | Age: 24
End: 2019-03-14

## 2019-03-14 NOTE — TELEPHONE ENCOUNTER
Contacted patient.   Meds: Just stopped Topamax  PMH: Headaches & Migraines  PSH: tonsillectomy  Denies any bleeding, pain, or concern. Advised to keep appt as scheduled.

## 2019-03-14 NOTE — TELEPHONE ENCOUNTER
Pt found out yesterday, she is 4 weeks pregnant. Wants to notify Dr. Kenn Buchanan, per his instructions when she was prescribed her current meds.

## 2019-03-14 NOTE — TELEPHONE ENCOUNTER
Called patient; recommend stop Topamax with quick taper - 25 mg bid x2 days, then stop taking - advised to see OB and notify she was on Topamax and clear which medications she can take during pregnancy - advised to take folic acid /Prenatal vitamin as well

## 2019-03-14 NOTE — TELEPHONE ENCOUNTER
Patient calling to initiate prenatal care  LMP 02/14/2019  Patient is 7-8 weeks on   Confirmation Ultrasound and Appointment scheduled on  04/10/2019   Insurance [employer] PPO  Good time to return phone call Anytime.       Patient wanted to let us know that she

## 2019-04-08 ENCOUNTER — OFFICE VISIT (OUTPATIENT)
Dept: OBGYN CLINIC | Facility: CLINIC | Age: 24
End: 2019-04-08
Payer: COMMERCIAL

## 2019-04-08 ENCOUNTER — ULTRASOUND ENCOUNTER (OUTPATIENT)
Dept: OBGYN CLINIC | Facility: CLINIC | Age: 24
End: 2019-04-08
Payer: COMMERCIAL

## 2019-04-08 DIAGNOSIS — Z32.01 PREGNANCY EXAMINATION OR TEST, POSITIVE RESULT: ICD-10-CM

## 2019-04-08 DIAGNOSIS — N91.1 SECONDARY AMENORRHEA: Primary | ICD-10-CM

## 2019-04-08 PROCEDURE — 99212 OFFICE O/P EST SF 10 MIN: CPT | Performed by: OBSTETRICS & GYNECOLOGY

## 2019-04-08 PROCEDURE — 76830 TRANSVAGINAL US NON-OB: CPT | Performed by: OBSTETRICS & GYNECOLOGY

## 2019-04-08 NOTE — PROGRESS NOTES
Transvaginal ultrasound for confimation of pregnancy  7w4d by lmp 02/14/19  iup 6w1d   Cardiac activity not identified  Probable missed ab  Will reimage for verification

## 2019-04-08 NOTE — PROGRESS NOTES
Reviewed u/s findings with pt  D/w her that I am not optimistic for viable pregnancy but very early and small iup  Will reimage in 14 d  sab precautions given  Questions answered

## 2019-04-19 ENCOUNTER — TELEPHONE (OUTPATIENT)
Dept: OBGYN CLINIC | Facility: CLINIC | Age: 24
End: 2019-04-19

## 2019-04-19 NOTE — TELEPHONE ENCOUNTER
Patient was having pink spotting that now turned to red. She has cramping on and off, nothing severe.  It just turned from pink to red this AM.  Last OV date: 04/08/19  Recent Test/Labs: IUP 6W1D no cardiac activity  Recommendations: Advised patient to cont

## 2019-04-21 ENCOUNTER — TELEPHONE (OUTPATIENT)
Dept: OBGYN CLINIC | Facility: CLINIC | Age: 24
End: 2019-04-21

## 2019-04-21 NOTE — TELEPHONE ENCOUNTER
Passing small clots this morning with associated cramping. Not hemorrhaging. Ultrasound early this month with probable missed . Probable evolving miscarriage. Tylenol or Ibuprofen as needed. To call if bleeding worsens.  Known Rh negative and celestino

## 2019-04-22 ENCOUNTER — LAB ENCOUNTER (OUTPATIENT)
Dept: LAB | Age: 24
End: 2019-04-22
Attending: OBSTETRICS & GYNECOLOGY
Payer: COMMERCIAL

## 2019-04-22 ENCOUNTER — OFFICE VISIT (OUTPATIENT)
Dept: OBGYN CLINIC | Facility: CLINIC | Age: 24
End: 2019-04-22
Payer: COMMERCIAL

## 2019-04-22 ENCOUNTER — ULTRASOUND ENCOUNTER (OUTPATIENT)
Dept: OBGYN CLINIC | Facility: CLINIC | Age: 24
End: 2019-04-22
Payer: COMMERCIAL

## 2019-04-22 VITALS
WEIGHT: 276 LBS | BODY MASS INDEX: 40.88 KG/M2 | HEIGHT: 69 IN | SYSTOLIC BLOOD PRESSURE: 116 MMHG | DIASTOLIC BLOOD PRESSURE: 78 MMHG | HEART RATE: 78 BPM

## 2019-04-22 DIAGNOSIS — O02.1 MISSED ABORTION: Primary | ICD-10-CM

## 2019-04-22 DIAGNOSIS — O09.291 PREGNANCY IN FIRST TRIMESTER WITH HISTORY OF ABORTION: ICD-10-CM

## 2019-04-22 DIAGNOSIS — O03.9 SPONTANEOUS ABORTION: ICD-10-CM

## 2019-04-22 DIAGNOSIS — O02.1 MISSED ABORTION: ICD-10-CM

## 2019-04-22 PROCEDURE — 96372 THER/PROPH/DIAG INJ SC/IM: CPT | Performed by: OBSTETRICS & GYNECOLOGY

## 2019-04-22 PROCEDURE — 99213 OFFICE O/P EST LOW 20 MIN: CPT | Performed by: OBSTETRICS & GYNECOLOGY

## 2019-04-22 PROCEDURE — 36415 COLL VENOUS BLD VENIPUNCTURE: CPT | Performed by: OBSTETRICS & GYNECOLOGY

## 2019-04-22 PROCEDURE — 84702 CHORIONIC GONADOTROPIN TEST: CPT | Performed by: OBSTETRICS & GYNECOLOGY

## 2019-04-22 PROCEDURE — 76830 TRANSVAGINAL US NON-OB: CPT | Performed by: OBSTETRICS & GYNECOLOGY

## 2019-04-22 RX ORDER — PRENATAL VIT/IRON FUM/FOLIC AC 27 MG-1 MG
TABLET ORAL
COMMUNITY
End: 2020-05-08

## 2019-04-22 NOTE — PROGRESS NOTES
Follow up ultrasound   Hx of heavy bleeding, known iup-no cardiac activity noted on u/s 2 wks ago  Today :  No IUP seen  Endometrium 14 mm, no color flow noted  Blood clot or product at cervix  Adnexa normal  Uterus: 8.32 x 5.73 x 4.88 cm  No free fluid

## 2019-04-22 NOTE — PROGRESS NOTES
Here to review ultrasound    Had episode of heavy bleeding last am-with clotting    Reviewed u/s appears c/w spontaneous ab. Some clot in cervix.  She prefers observation of this    Recommend we follow bhcg to 0  Needs rhogam today    (O02.1) Missed aborti

## 2019-05-07 ENCOUNTER — LAB ENCOUNTER (OUTPATIENT)
Dept: LAB | Facility: HOSPITAL | Age: 24
End: 2019-05-07
Attending: OBSTETRICS & GYNECOLOGY
Payer: COMMERCIAL

## 2019-05-07 DIAGNOSIS — O03.9 MISCARRIAGE: ICD-10-CM

## 2019-05-07 PROCEDURE — 84702 CHORIONIC GONADOTROPIN TEST: CPT

## 2019-05-07 PROCEDURE — 36415 COLL VENOUS BLD VENIPUNCTURE: CPT

## 2019-05-08 DIAGNOSIS — O02.1 MISSED ABORTION: Primary | ICD-10-CM

## 2019-05-14 ENCOUNTER — LAB ENCOUNTER (OUTPATIENT)
Dept: LAB | Facility: HOSPITAL | Age: 24
End: 2019-05-14
Attending: OBSTETRICS & GYNECOLOGY
Payer: COMMERCIAL

## 2019-05-14 DIAGNOSIS — O02.1 MISSED ABORTION: ICD-10-CM

## 2019-05-14 PROCEDURE — 36415 COLL VENOUS BLD VENIPUNCTURE: CPT

## 2019-05-14 PROCEDURE — 84702 CHORIONIC GONADOTROPIN TEST: CPT

## 2019-08-06 ENCOUNTER — TELEPHONE (OUTPATIENT)
Dept: OBGYN CLINIC | Facility: CLINIC | Age: 24
End: 2019-08-06

## 2019-08-06 NOTE — TELEPHONE ENCOUNTER
Patient calling to initiate prenatal care  LMP 7/10  Patient is 7-8 weeks 9/4  Confirmation Ultrasound and Appointment scheduled on   Future Appointments   Date Time Provider Sara Woodard   9/9/2019  1:00 PM [facility] US [facility] P [facility] [address]

## 2019-08-06 NOTE — TELEPHONE ENCOUNTER
; history of miscarriage in ;   x 3. Pt feeling well at this time; mild nausea. Pt will start PNV with DHA; not taking any other meds. Pt advised to call with any questions or concerns. Patient verbalized understanding.

## 2019-09-09 ENCOUNTER — OFFICE VISIT (OUTPATIENT)
Dept: OBGYN CLINIC | Facility: CLINIC | Age: 24
End: 2019-09-09
Payer: COMMERCIAL

## 2019-09-09 ENCOUNTER — APPOINTMENT (OUTPATIENT)
Dept: OBGYN CLINIC | Facility: CLINIC | Age: 24
End: 2019-09-09

## 2019-09-09 VITALS
BODY MASS INDEX: 43.9 KG/M2 | DIASTOLIC BLOOD PRESSURE: 74 MMHG | HEART RATE: 91 BPM | HEIGHT: 68.5 IN | SYSTOLIC BLOOD PRESSURE: 128 MMHG | WEIGHT: 293 LBS

## 2019-09-09 DIAGNOSIS — N91.1 SECONDARY AMENORRHEA: ICD-10-CM

## 2019-09-09 DIAGNOSIS — Z32.01 PREGNANCY EXAMINATION OR TEST, POSITIVE RESULT: Primary | ICD-10-CM

## 2019-09-09 PROCEDURE — 76856 US EXAM PELVIC COMPLETE: CPT | Performed by: OBSTETRICS & GYNECOLOGY

## 2019-09-09 PROCEDURE — 99213 OFFICE O/P EST LOW 20 MIN: CPT | Performed by: OBSTETRICS & GYNECOLOGY

## 2019-09-09 NOTE — PROGRESS NOTES
[facility]  Obstetrics and Gynecology    Subjective:     Kayla Colby is a 30year old  female presents with c/o secondary amenorrhea and positive pregnancy test. The patient was recommended to return for further evaluation and a pelvi

## 2019-09-13 ENCOUNTER — TELEPHONE (OUTPATIENT)
Dept: OBGYN CLINIC | Facility: CLINIC | Age: 24
End: 2019-09-13

## 2019-09-13 NOTE — TELEPHONE ENCOUNTER
[id number] - she was seen for  on 09/09/19. She states she has a cough that worsens at night. Both of her children are sick and she thinks she got it from them. She denies any fever, SOB. Denies any vaginal bleeding or cramping.   Last OV: 08/06/19   Pregna
Pt asking what Cold medicine she can take before 12 weeks of pregnancy    Please call
Yes

## 2019-09-18 ENCOUNTER — OFFICE VISIT (OUTPATIENT)
Dept: FAMILY MEDICINE CLINIC | Facility: CLINIC | Age: 24
End: 2019-09-18
Payer: COMMERCIAL

## 2019-09-18 VITALS
HEIGHT: 68.5 IN | WEIGHT: 293 LBS | OXYGEN SATURATION: 98 % | DIASTOLIC BLOOD PRESSURE: 80 MMHG | HEART RATE: 90 BPM | TEMPERATURE: 98 F | SYSTOLIC BLOOD PRESSURE: 142 MMHG | RESPIRATION RATE: 20 BRPM | BODY MASS INDEX: 43.9 KG/M2

## 2019-09-18 DIAGNOSIS — R05.9 COUGH: Primary | ICD-10-CM

## 2019-09-18 DIAGNOSIS — J06.9 VIRAL UPPER RESPIRATORY TRACT INFECTION: ICD-10-CM

## 2019-09-18 PROCEDURE — 99213 OFFICE O/P EST LOW 20 MIN: CPT | Performed by: FAMILY MEDICINE

## 2019-09-18 NOTE — PATIENT INSTRUCTIONS
Monitor symptoms for now. Use otc meds for comfort as needed--  Delsym for cough  Benadryl at bedtime to reduce drainage and promote rest.   Use zyrtec/claritin in the AM to reduce drainage without sedation.    Consider applying antonella's vapo-rub or eucal

## 2019-09-18 NOTE — PROGRESS NOTES
CHIEF COMPLAINT:   Patient presents with:  Cough: Coughing up some flem, dry cough, X 10 days       HPI:   Ubaldo Leigh is a 30year old female who presents for upper respiratory symptoms for  10 days.  Patient reports persistent cough-- occasionally pr abnormal skin lesions  HEENT: See HPI  LUNGS: denies shortness of breath or wheezing, See HPI  CARDIOVASCULAR: denies chest pain or palpitations   GI: denies N/V/C or abdominal pain  NEURO: Denies headaches    EXAM:   /80   Pulse 90   Temp 98 °F (36. symptoms steadily worsen follow-up for further evaluation. The patient indicates understanding of these issues and agrees to the plan.

## 2019-09-22 ENCOUNTER — OFFICE VISIT (OUTPATIENT)
Dept: FAMILY MEDICINE CLINIC | Facility: CLINIC | Age: 24
End: 2019-09-22
Payer: COMMERCIAL

## 2019-09-22 VITALS
BODY MASS INDEX: 43.9 KG/M2 | RESPIRATION RATE: 17 BRPM | DIASTOLIC BLOOD PRESSURE: 70 MMHG | WEIGHT: 293 LBS | HEART RATE: 88 BPM | HEIGHT: 68.5 IN | TEMPERATURE: 98 F | OXYGEN SATURATION: 98 % | SYSTOLIC BLOOD PRESSURE: 128 MMHG

## 2019-09-22 DIAGNOSIS — R05.9 COUGH: ICD-10-CM

## 2019-09-22 DIAGNOSIS — J01.40 ACUTE NON-RECURRENT PANSINUSITIS: Primary | ICD-10-CM

## 2019-09-22 PROCEDURE — 99213 OFFICE O/P EST LOW 20 MIN: CPT | Performed by: NURSE PRACTITIONER

## 2019-09-22 RX ORDER — AMOXICILLIN 875 MG/1
875 TABLET, COATED ORAL 2 TIMES DAILY
Qty: 20 TABLET | Refills: 0 | Status: SHIPPED | OUTPATIENT
Start: 2019-09-22 | End: 2019-10-02

## 2019-09-22 NOTE — PROGRESS NOTES
CHIEF COMPLAINT:   Patient presents with:  URI: coughing up yellow flem, congested, x 14 days. HPI:   Genia Westbrook is a 30year old female who presents for upper respiratory symptoms for  2  weeks.  She was seen last week in [facility] and since that t • Psychiatric Father    • Hypertension Maternal Grandfather    • Diabetes Maternal Grandfather    • Breast Cancer Maternal Aunt       Social History    Tobacco Use      Smoking status: Former Smoker        Packs/day: 0.50        Years: 3.00        Pack yea Based on symptoms reported by pt- symptoms were consistent with URI, but now is more consistent with sinusitis and cough. Will treat with amox.     Meds & Refills for this Visit:  Requested Prescriptions     Signed Prescriptions Disp Refills   • amoxicillin Your doctor may prescribe medications to help treat your sinusitis. If you have an infection, antibiotics can help clear it up. If you are prescribed antibiotics, take all pills on schedule until they are gone, even if you feel better.  Decongestants help r

## 2019-09-22 NOTE — PATIENT INSTRUCTIONS
-Take antibiotics with food and plenty of water. Eat yogurt or take probiotic daily. Align is a good otc probiotic.   -stay well hydrated and rest  -can try nasal saline spray   -follow up with primary care if new or worsening symptoms, or no improvement 1917-0215 [employer]. 1407 [facility], [facility] [place]. All rights reserved. This information is not intended as a substitute for professional medical care. Always follow your healthcare professional's instructions.

## 2019-09-30 ENCOUNTER — INITIAL PRENATAL (OUTPATIENT)
Dept: OBGYN CLINIC | Facility: CLINIC | Age: 24
End: 2019-09-30
Payer: COMMERCIAL

## 2019-09-30 VITALS
SYSTOLIC BLOOD PRESSURE: 122 MMHG | DIASTOLIC BLOOD PRESSURE: 76 MMHG | BODY MASS INDEX: 43.9 KG/M2 | WEIGHT: 293 LBS | HEIGHT: 68.5 IN

## 2019-09-30 DIAGNOSIS — O99.211 OBESITY AFFECTING PREGNANCY IN FIRST TRIMESTER: ICD-10-CM

## 2019-09-30 DIAGNOSIS — Z12.4 CERVICAL CANCER SCREENING: ICD-10-CM

## 2019-09-30 DIAGNOSIS — Z36.9 PRENATAL SCREENING ENCOUNTER: Primary | ICD-10-CM

## 2019-09-30 DIAGNOSIS — Z34.81 ENCOUNTER FOR SUPERVISION OF OTHER NORMAL PREGNANCY IN FIRST TRIMESTER: ICD-10-CM

## 2019-09-30 LAB
GLUCOSE (URINE DIPSTICK): NEGATIVE MG/DL
MULTISTIX LOT#: NORMAL NUMERIC
PROTEIN (URINE DIPSTICK): NEGATIVE MG/DL

## 2019-09-30 PROCEDURE — 88175 CYTOPATH C/V AUTO FLUID REDO: CPT | Performed by: NURSE PRACTITIONER

## 2019-09-30 PROCEDURE — 81002 URINALYSIS NONAUTO W/O SCOPE: CPT | Performed by: NURSE PRACTITIONER

## 2019-09-30 PROCEDURE — 87086 URINE CULTURE/COLONY COUNT: CPT | Performed by: NURSE PRACTITIONER

## 2019-09-30 NOTE — PROGRESS NOTES
Here for initial prenatal visit with our group. 30year old [id number] at 13w0d by [address]. Patient's last menstrual period was 07/10/2019 (within days).   Last pap smear longer than 3 years ago and it was normal.    OB History    Para Term  Leo Alzada

## 2019-10-09 ENCOUNTER — TELEPHONE (OUTPATIENT)
Dept: OBGYN CLINIC | Facility: CLINIC | Age: 24
End: 2019-10-09

## 2019-10-09 ENCOUNTER — OFFICE VISIT (OUTPATIENT)
Dept: INTERNAL MEDICINE CLINIC | Facility: CLINIC | Age: 24
End: 2019-10-09
Payer: COMMERCIAL

## 2019-10-09 VITALS
DIASTOLIC BLOOD PRESSURE: 70 MMHG | TEMPERATURE: 98 F | HEIGHT: 68.5 IN | SYSTOLIC BLOOD PRESSURE: 126 MMHG | WEIGHT: 293 LBS | HEART RATE: 82 BPM | BODY MASS INDEX: 43.9 KG/M2 | RESPIRATION RATE: 16 BRPM

## 2019-10-09 DIAGNOSIS — R05.9 COUGH: Primary | ICD-10-CM

## 2019-10-09 DIAGNOSIS — J06.9 UPPER RESPIRATORY TRACT INFECTION, UNSPECIFIED TYPE: ICD-10-CM

## 2019-10-09 PROCEDURE — 99214 OFFICE O/P EST MOD 30 MIN: CPT | Performed by: INTERNAL MEDICINE

## 2019-10-09 RX ORDER — BENZONATATE 200 MG/1
200 CAPSULE ORAL 3 TIMES DAILY PRN
Qty: 30 CAPSULE | Refills: 0 | Status: SHIPPED | OUTPATIENT
Start: 2019-10-09 | End: 2019-10-17

## 2019-10-09 RX ORDER — AMOXICILLIN AND CLAVULANATE POTASSIUM 875; 125 MG/1; MG/1
1 TABLET, FILM COATED ORAL 2 TIMES DAILY
Qty: 14 TABLET | Refills: 0 | Status: SHIPPED | OUTPATIENT
Start: 2019-10-09 | End: 2019-10-17 | Stop reason: ALTCHOICE

## 2019-10-09 NOTE — TELEPHONE ENCOUNTER
Patient was prescribed Augmentin and Benzonatate by PCP for cough/sinus infection. She has tried Delsym and Robitussin with no relief. I advised patient OK per Dr. Trino Jones.  To call if symptoms do not improve or worsen,

## 2019-10-14 ENCOUNTER — PATIENT MESSAGE (OUTPATIENT)
Dept: INTERNAL MEDICINE CLINIC | Facility: CLINIC | Age: 24
End: 2019-10-14

## 2019-10-14 NOTE — TELEPHONE ENCOUNTER
From: Vania Linares  To: Shena Brantley MD  Sent: 10/14/2019 10:04 AM CDT  Subject: Visit Follow-up Question    good morning   it has been 5 days since the antibitoics started and honestly i feel like my sinuses and coughing has hotten worse.  i now am lacey

## 2019-10-17 ENCOUNTER — TELEPHONE (OUTPATIENT)
Dept: OBGYN CLINIC | Facility: CLINIC | Age: 24
End: 2019-10-17

## 2019-10-17 ENCOUNTER — OFFICE VISIT (OUTPATIENT)
Dept: INTERNAL MEDICINE CLINIC | Facility: CLINIC | Age: 24
End: 2019-10-17
Payer: COMMERCIAL

## 2019-10-17 VITALS
HEIGHT: 68.5 IN | WEIGHT: 292.69 LBS | RESPIRATION RATE: 12 BRPM | HEART RATE: 76 BPM | BODY MASS INDEX: 43.85 KG/M2 | TEMPERATURE: 98 F | SYSTOLIC BLOOD PRESSURE: 110 MMHG | DIASTOLIC BLOOD PRESSURE: 76 MMHG

## 2019-10-17 DIAGNOSIS — R05.9 COUGH: Primary | ICD-10-CM

## 2019-10-17 PROCEDURE — 99214 OFFICE O/P EST MOD 30 MIN: CPT | Performed by: INTERNAL MEDICINE

## 2019-10-17 RX ORDER — AZITHROMYCIN 250 MG/1
TABLET, FILM COATED ORAL
Qty: 6 TABLET | Refills: 0 | Status: SHIPPED | OUTPATIENT
Start: 2019-10-17 | End: 2019-11-25

## 2019-10-17 RX ORDER — PREDNISONE 20 MG/1
20 TABLET ORAL DAILY
Qty: 5 TABLET | Refills: 0 | Status: SHIPPED | OUTPATIENT
Start: 2019-10-17 | End: 2019-11-25

## 2019-10-17 NOTE — TELEPHONE ENCOUNTER
Pt calling and was put on antibiotics and steroids by her Primary Doctor    Pt wants to make sure OK to take    Z-Dmitry and Prednisone    Please advise PT

## 2019-10-17 NOTE — PROGRESS NOTES
[facility]    CHIEF COMPLAINT:  Patient presents with: Follow - Up: regarding cough. Cough is getting worse, especially at night. Finished antibiotic. No relief from [address] [place] [address]. 9/30/19-pap.   Pt is pregnant         HISTORY OF PRESENT [facility] Pap AP TEST REFLEXED    THINPREP PAP WITH HPV REFLEX REQUEST   Result Value Ref Range    Interpretation/Result Negative for intraepithelial lesion or malignancy Negative for intraepithelial lesion or malignancy    Specimen Adequacy       Satisfactory for e total) by mouth daily. Dispense: 5 tablet; Refill: 0    She is aware to check with her ob/gyne regarding the safety of the above medications during pregnancy. Return if symptoms worsen or fail to improve.       Otf East MD

## 2019-10-17 NOTE — TELEPHONE ENCOUNTER
[id number] called regarding medications she was prescribed. She has been dealing with cough and sinus issues. She was put on Amoxicillin, Augmentin with no relief. She is still with cough that is so bad it makes her vomit.  She saw PCP who wants her to s

## 2019-10-28 ENCOUNTER — LAB ENCOUNTER (OUTPATIENT)
Dept: LAB | Facility: HOSPITAL | Age: 24
End: 2019-10-28
Attending: NURSE PRACTITIONER
Payer: COMMERCIAL

## 2019-10-28 DIAGNOSIS — Z34.81 ENCOUNTER FOR SUPERVISION OF OTHER NORMAL PREGNANCY IN FIRST TRIMESTER: ICD-10-CM

## 2019-10-28 PROCEDURE — 86780 TREPONEMA PALLIDUM: CPT

## 2019-10-28 PROCEDURE — 87340 HEPATITIS B SURFACE AG IA: CPT

## 2019-10-28 PROCEDURE — 86850 RBC ANTIBODY SCREEN: CPT

## 2019-10-28 PROCEDURE — 86901 BLOOD TYPING SEROLOGIC RH(D): CPT

## 2019-10-28 PROCEDURE — 36415 COLL VENOUS BLD VENIPUNCTURE: CPT

## 2019-10-28 PROCEDURE — 86762 RUBELLA ANTIBODY: CPT

## 2019-10-28 PROCEDURE — 85025 COMPLETE CBC W/AUTO DIFF WBC: CPT

## 2019-10-28 PROCEDURE — 86900 BLOOD TYPING SEROLOGIC ABO: CPT

## 2019-10-28 PROCEDURE — 87389 HIV-1 AG W/HIV-1&-2 AB AG IA: CPT

## 2019-11-02 ENCOUNTER — ROUTINE PRENATAL (OUTPATIENT)
Dept: OBGYN CLINIC | Facility: CLINIC | Age: 24
End: 2019-11-02
Payer: COMMERCIAL

## 2019-11-02 VITALS — WEIGHT: 293 LBS | DIASTOLIC BLOOD PRESSURE: 70 MMHG | SYSTOLIC BLOOD PRESSURE: 122 MMHG | BODY MASS INDEX: 45 KG/M2

## 2019-11-02 DIAGNOSIS — Z36.9 PRENATAL SCREENING ENCOUNTER: Primary | ICD-10-CM

## 2019-11-02 PROCEDURE — 81002 URINALYSIS NONAUTO W/O SCOPE: CPT | Performed by: OBSTETRICS & GYNECOLOGY

## 2019-11-02 NOTE — PROGRESS NOTES
MINOO  Doing well. Denies Vb, LOF, contractions. No fetal movement.      Patient Active Problem List:     Supervision of normal pregnancy     Migraines     BMI 40.0-44.9, adult ([facility] [address]     Other headache syndrome     Obesity affecting pregnancy in first trimest

## 2019-11-18 ENCOUNTER — TELEPHONE (OUTPATIENT)
Dept: OBGYN CLINIC | Facility: CLINIC | Age: 24
End: 2019-11-18

## 2019-11-18 RX ORDER — CLINDAMYCIN HYDROCHLORIDE 300 MG/1
300 CAPSULE ORAL 2 TIMES DAILY
Qty: 14 CAPSULE | Refills: 0 | Status: SHIPPED | OUTPATIENT
Start: 2019-11-18 | End: 2019-11-25

## 2019-11-18 NOTE — TELEPHONE ENCOUNTER
G6/P 2123 GA 20 wks patient complaining of vaginal itching and discharge with a foul fishy odor that is especially worse after intercourse.    Last OV: 11/2/19 [place] with Dr. Matheus Chawla   Pregnancy Complications: obesity, migraines, hx of SAB  Abdominal pain: den

## 2019-11-23 ENCOUNTER — TELEPHONE (OUTPATIENT)
Dept: OBGYN CLINIC | Facility: CLINIC | Age: 24
End: 2019-11-23

## 2019-11-23 NOTE — TELEPHONE ENCOUNTER
Received medical records request from [facility]. Sent to scan stat to be processed.

## 2019-11-25 ENCOUNTER — ULTRASOUND ENCOUNTER (OUTPATIENT)
Dept: OBGYN CLINIC | Facility: CLINIC | Age: 24
End: 2019-11-25
Payer: COMMERCIAL

## 2019-11-25 ENCOUNTER — ROUTINE PRENATAL (OUTPATIENT)
Dept: OBGYN CLINIC | Facility: CLINIC | Age: 24
End: 2019-11-25
Payer: COMMERCIAL

## 2019-11-25 VITALS — SYSTOLIC BLOOD PRESSURE: 118 MMHG | WEIGHT: 293 LBS | DIASTOLIC BLOOD PRESSURE: 84 MMHG | BODY MASS INDEX: 45 KG/M2

## 2019-11-25 DIAGNOSIS — Z34.81 ENCOUNTER FOR SUPERVISION OF OTHER NORMAL PREGNANCY IN FIRST TRIMESTER: ICD-10-CM

## 2019-11-25 DIAGNOSIS — Z36.3 ANTENATAL SCREENING FOR MALFORMATION USING ULTRASONICS: ICD-10-CM

## 2019-11-25 DIAGNOSIS — Z36.9 PRENATAL SCREENING ENCOUNTER: Primary | ICD-10-CM

## 2019-11-25 PROCEDURE — 76805 OB US >/= 14 WKS SNGL FETUS: CPT | Performed by: OBSTETRICS & GYNECOLOGY

## 2019-11-25 NOTE — PROGRESS NOTES
No C/O, good FM  Scan: sub optimal LVOT  Cord insertion not seen  GL ordered  Complete views next visit, 4 weeks

## 2019-12-28 ENCOUNTER — LAB ENCOUNTER (OUTPATIENT)
Dept: LAB | Facility: HOSPITAL | Age: 24
End: 2019-12-28
Attending: OBSTETRICS & GYNECOLOGY
Payer: COMMERCIAL

## 2019-12-28 DIAGNOSIS — Z34.81 ENCOUNTER FOR SUPERVISION OF OTHER NORMAL PREGNANCY IN FIRST TRIMESTER: ICD-10-CM

## 2019-12-28 PROCEDURE — 85025 COMPLETE CBC W/AUTO DIFF WBC: CPT

## 2019-12-28 PROCEDURE — 82950 GLUCOSE TEST: CPT

## 2019-12-28 PROCEDURE — 36415 COLL VENOUS BLD VENIPUNCTURE: CPT

## 2019-12-30 ENCOUNTER — ROUTINE PRENATAL (OUTPATIENT)
Dept: OBGYN CLINIC | Facility: CLINIC | Age: 24
End: 2019-12-30
Payer: COMMERCIAL

## 2019-12-30 ENCOUNTER — ULTRASOUND ENCOUNTER (OUTPATIENT)
Dept: OBGYN CLINIC | Facility: CLINIC | Age: 24
End: 2019-12-30
Payer: COMMERCIAL

## 2019-12-30 VITALS
DIASTOLIC BLOOD PRESSURE: 82 MMHG | WEIGHT: 293 LBS | SYSTOLIC BLOOD PRESSURE: 128 MMHG | BODY MASS INDEX: 43.9 KG/M2 | HEIGHT: 68.5 IN

## 2019-12-30 DIAGNOSIS — Z36.9 PRENATAL SCREENING ENCOUNTER: Primary | ICD-10-CM

## 2019-12-30 DIAGNOSIS — Z34.81 ENCOUNTER FOR SUPERVISION OF OTHER NORMAL PREGNANCY IN FIRST TRIMESTER: ICD-10-CM

## 2019-12-30 DIAGNOSIS — Z36.3 ANTENATAL SCREENING FOR MALFORMATION USING ULTRASONICS: ICD-10-CM

## 2019-12-30 PROCEDURE — 81002 URINALYSIS NONAUTO W/O SCOPE: CPT | Performed by: OBSTETRICS & GYNECOLOGY

## 2019-12-30 PROCEDURE — 76816 OB US FOLLOW-UP PER FETUS: CPT | Performed by: OBSTETRICS & GYNECOLOGY

## 2020-01-11 ENCOUNTER — APPOINTMENT (OUTPATIENT)
Dept: GENERAL RADIOLOGY | Facility: HOSPITAL | Age: 25
End: 2020-01-11
Attending: EMERGENCY MEDICINE
Payer: COMMERCIAL

## 2020-01-11 ENCOUNTER — LAB ENCOUNTER (OUTPATIENT)
Dept: LAB | Facility: HOSPITAL | Age: 25
End: 2020-01-11
Attending: NURSE PRACTITIONER
Payer: COMMERCIAL

## 2020-01-11 ENCOUNTER — ROUTINE PRENATAL (OUTPATIENT)
Dept: OBGYN CLINIC | Facility: CLINIC | Age: 25
End: 2020-01-11
Payer: COMMERCIAL

## 2020-01-11 ENCOUNTER — HOSPITAL ENCOUNTER (EMERGENCY)
Facility: HOSPITAL | Age: 25
Discharge: HOME | End: 2020-01-12
Attending: EMERGENCY MEDICINE
Payer: COMMERCIAL

## 2020-01-11 VITALS — DIASTOLIC BLOOD PRESSURE: 64 MMHG | BODY MASS INDEX: 46 KG/M2 | WEIGHT: 293 LBS | SYSTOLIC BLOOD PRESSURE: 110 MMHG

## 2020-01-11 DIAGNOSIS — Z67.91 RH NEGATIVE STATE IN ANTEPARTUM PERIOD: ICD-10-CM

## 2020-01-11 DIAGNOSIS — O26.899 RH NEGATIVE STATE IN ANTEPARTUM PERIOD: ICD-10-CM

## 2020-01-11 DIAGNOSIS — Z36.9 PRENATAL SCREENING ENCOUNTER: Primary | ICD-10-CM

## 2020-01-11 DIAGNOSIS — Z34.83 ENCOUNTER FOR SUPERVISION OF OTHER NORMAL PREGNANCY IN THIRD TRIMESTER: ICD-10-CM

## 2020-01-11 DIAGNOSIS — J18.9 COMMUNITY ACQUIRED PNEUMONIA, UNSPECIFIED LATERALITY: Primary | ICD-10-CM

## 2020-01-11 LAB
ALBUMIN SERPL-MCNC: 2.7 G/DL (ref 3.4–5)
ALBUMIN/GLOB SERPL: 0.6 {RATIO} (ref 1–2)
ALP LIVER SERPL-CCNC: 75 U/L (ref 37–98)
ALT SERPL-CCNC: 14 U/L (ref 13–56)
ANION GAP SERPL CALC-SCNC: 7 MMOL/L (ref 0–18)
ANTIBODY SCREEN: NEGATIVE
AST SERPL-CCNC: 11 U/L (ref 15–37)
BASOPHILS # BLD AUTO: 0.03 X10(3) UL (ref 0–0.2)
BASOPHILS NFR BLD AUTO: 0.2 %
BILIRUB SERPL-MCNC: 0.3 MG/DL (ref 0.1–2)
BILIRUB UR QL STRIP.AUTO: NEGATIVE
BUN BLD-MCNC: 7 MG/DL (ref 7–18)
BUN/CREAT SERPL: 11.9 (ref 10–20)
CALCIUM BLD-MCNC: 8.8 MG/DL (ref 8.5–10.1)
CHLORIDE SERPL-SCNC: 106 MMOL/L (ref 98–112)
CLARITY UR REFRACT.AUTO: CLEAR
CO2 SERPL-SCNC: 22 MMOL/L (ref 21–32)
CREAT BLD-MCNC: 0.59 MG/DL (ref 0.55–1.02)
DEPRECATED RDW RBC AUTO: 45.1 FL (ref 35.1–46.3)
EOSINOPHIL # BLD AUTO: 0.01 X10(3) UL (ref 0–0.7)
EOSINOPHIL NFR BLD AUTO: 0.1 %
ERYTHROCYTE [DISTWIDTH] IN BLOOD BY AUTOMATED COUNT: 13.6 % (ref 11–15)
FLUAV + FLUBV RNA SPEC NAA+PROBE: NEGATIVE
GLOBULIN PLAS-MCNC: 4.4 G/DL (ref 2.8–4.4)
GLUCOSE BLD-MCNC: 96 MG/DL (ref 70–99)
GLUCOSE UR STRIP.AUTO-MCNC: NEGATIVE MG/DL
HCT VFR BLD AUTO: 36.1 % (ref 35–48)
HGB BLD-MCNC: 12 G/DL (ref 12–16)
IMM GRANULOCYTES # BLD AUTO: 0.11 X10(3) UL (ref 0–1)
IMM GRANULOCYTES NFR BLD: 0.9 %
LYMPHOCYTES # BLD AUTO: 1.33 X10(3) UL (ref 1–4)
LYMPHOCYTES NFR BLD AUTO: 10.7 %
M PROTEIN MFR SERPL ELPH: 7.1 G/DL (ref 6.4–8.2)
MCH RBC QN AUTO: 30.1 PG (ref 26–34)
MCHC RBC AUTO-ENTMCNC: 33.2 G/DL (ref 31–37)
MCV RBC AUTO: 90.5 FL (ref 80–100)
MONOCYTES # BLD AUTO: 0.66 X10(3) UL (ref 0.1–1)
MONOCYTES NFR BLD AUTO: 5.3 %
MULTISTIX LOT#: NORMAL NUMERIC
NEUTROPHILS # BLD AUTO: 10.31 X10 (3) UL (ref 1.5–7.7)
NEUTROPHILS # BLD AUTO: 10.31 X10(3) UL (ref 1.5–7.7)
NEUTROPHILS NFR BLD AUTO: 82.8 %
NITRITE UR QL STRIP.AUTO: NEGATIVE
OSMOLALITY SERPL CALC.SUM OF ELEC: 278 MOSM/KG (ref 275–295)
PH UR STRIP.AUTO: 8 [PH] (ref 4.5–8)
PLATELET # BLD AUTO: 177 10(3)UL (ref 150–450)
POTASSIUM SERPL-SCNC: 3.6 MMOL/L (ref 3.5–5.1)
PROT UR STRIP.AUTO-MCNC: NEGATIVE MG/DL
RBC # BLD AUTO: 3.99 X10(6)UL (ref 3.8–5.3)
SODIUM SERPL-SCNC: 135 MMOL/L (ref 136–145)
SP GR UR STRIP.AUTO: <1.005 (ref 1–1.03)
UROBILINOGEN UR STRIP.AUTO-MCNC: 2 MG/DL
WBC # BLD AUTO: 12.5 X10(3) UL (ref 4–11)

## 2020-01-11 PROCEDURE — 36415 COLL VENOUS BLD VENIPUNCTURE: CPT

## 2020-01-11 PROCEDURE — 99284 EMERGENCY DEPT VISIT MOD MDM: CPT | Performed by: EMERGENCY MEDICINE

## 2020-01-11 PROCEDURE — 87502 INFLUENZA DNA AMP PROBE: CPT | Performed by: EMERGENCY MEDICINE

## 2020-01-11 PROCEDURE — 81001 URINALYSIS AUTO W/SCOPE: CPT | Performed by: EMERGENCY MEDICINE

## 2020-01-11 PROCEDURE — 96372 THER/PROPH/DIAG INJ SC/IM: CPT | Performed by: NURSE PRACTITIONER

## 2020-01-11 PROCEDURE — 87798 DETECT AGENT NOS DNA AMP: CPT | Performed by: EMERGENCY MEDICINE

## 2020-01-11 PROCEDURE — 87389 HIV-1 AG W/HIV-1&-2 AB AG IA: CPT

## 2020-01-11 PROCEDURE — 80053 COMPREHEN METABOLIC PANEL: CPT | Performed by: EMERGENCY MEDICINE

## 2020-01-11 PROCEDURE — 86850 RBC ANTIBODY SCREEN: CPT

## 2020-01-11 PROCEDURE — 85025 COMPLETE CBC W/AUTO DIFF WBC: CPT | Performed by: EMERGENCY MEDICINE

## 2020-01-11 PROCEDURE — 96365 THER/PROPH/DIAG IV INF INIT: CPT | Performed by: EMERGENCY MEDICINE

## 2020-01-11 PROCEDURE — 87999 UNLISTED MICROBIOLOGY PX: CPT

## 2020-01-11 PROCEDURE — 81002 URINALYSIS NONAUTO W/O SCOPE: CPT | Performed by: NURSE PRACTITIONER

## 2020-01-11 PROCEDURE — 96361 HYDRATE IV INFUSION ADD-ON: CPT | Performed by: EMERGENCY MEDICINE

## 2020-01-11 PROCEDURE — 71046 X-RAY EXAM CHEST 2 VIEWS: CPT | Performed by: EMERGENCY MEDICINE

## 2020-01-11 RX ORDER — ACETAMINOPHEN 500 MG
1000 TABLET ORAL ONCE
Status: COMPLETED | OUTPATIENT
Start: 2020-01-11 | End: 2020-01-11

## 2020-01-11 RX ORDER — ACETAMINOPHEN 500 MG
TABLET ORAL
Status: DISCONTINUED
Start: 2020-01-11 | End: 2020-01-12

## 2020-01-11 RX ORDER — AZITHROMYCIN 250 MG/1
250 TABLET, FILM COATED ORAL DAILY
Qty: 1 PACKAGE | Refills: 0 | Status: SHIPPED | OUTPATIENT
Start: 2020-01-11 | End: 2020-01-25 | Stop reason: ALTCHOICE

## 2020-01-11 NOTE — PROGRESS NOTES
MINOO  Doing well, no concerns  Antibody screen ordered, completed today  FM noted  RH negative, Rhogam given after antibody screen completed  TDAP recommended, will plan for next visit  RTC 2 weeks/ prn

## 2020-01-12 VITALS
BODY MASS INDEX: 43.4 KG/M2 | WEIGHT: 293 LBS | RESPIRATION RATE: 18 BRPM | DIASTOLIC BLOOD PRESSURE: 62 MMHG | TEMPERATURE: 99 F | SYSTOLIC BLOOD PRESSURE: 117 MMHG | HEIGHT: 69 IN | OXYGEN SATURATION: 98 % | HEART RATE: 108 BPM

## 2020-01-12 NOTE — PROGRESS NOTES
Patient is a 27.5 . EDC-4.6. 20. Patient denies any vaginal bleeding, leaking of fluid, or uterine contractions. EFM/TOCO explained and applied. NST done per Dr. Haider Rivero.

## 2020-01-12 NOTE — ED INITIAL ASSESSMENT (HPI)
A/O x 4. Patient is 27 weeks pregnant. Presents with cough, fever and body aches that started Thursday. Tmax 103. 1. Denies any abdominal pain or vaginal bleeding. Reports nausea and vomiting.        Tylenol 1000mg at 230pm

## 2020-01-12 NOTE — ED PROVIDER NOTES
Patient Seen in: [facility] Emergency Department      History   Patient presents with:  Fever  Cough/URI    Stated Complaint: fever, cough, 27 weeks 5 days gestation, OB notified, will send RN to ER for fe*    HPI    57-year-old female who is a grav src Oral   SpO2 97 %   O2 Device None (Room air)       Current:/62   Pulse 108   Temp 99 °F (37.2 °C) (Temporal)   Resp 18   Ht 175.3 cm (5' 9\")   Wt (!) 138.3 kg   LMP 07/10/2019 (Within Days)   SpO2 98%   BMI 45.04 kg/m²         Physical Exam    G Narrative: The following orders were created for panel order CBC WITH DIFFERENTIAL WITH PLATELET.   Procedure                               Abnormality         Status                     ---------                               -----------         ------ in the emergency department. Patient remained stable throughout the emergency department observation period. Patient was monitored by labor and delivery here in the emergency department. Laboratory work-up was unremarkable.   Patient's temperature impr Prescribed:  Discharge Medication List as of 1/12/2020 12:03 AM    START taking these medications    azithromycin (ZITHROMAX Z-ASHLEY) 250 MG Oral Tab  Take 1 tablet (250 mg total) by mouth daily.  2 TABLETS (250 MG ) BY MOUTH THE FIRST DAY, THEN 1 TABLET BY M

## 2020-01-25 ENCOUNTER — ROUTINE PRENATAL (OUTPATIENT)
Dept: OBGYN CLINIC | Facility: CLINIC | Age: 25
End: 2020-01-25
Payer: COMMERCIAL

## 2020-01-25 ENCOUNTER — OFFICE VISIT (OUTPATIENT)
Dept: INTERNAL MEDICINE CLINIC | Facility: CLINIC | Age: 25
End: 2020-01-25
Payer: COMMERCIAL

## 2020-01-25 VITALS
HEART RATE: 78 BPM | BODY MASS INDEX: 43.4 KG/M2 | WEIGHT: 293 LBS | TEMPERATURE: 98 F | SYSTOLIC BLOOD PRESSURE: 122 MMHG | RESPIRATION RATE: 16 BRPM | HEIGHT: 69 IN | OXYGEN SATURATION: 98 % | DIASTOLIC BLOOD PRESSURE: 78 MMHG

## 2020-01-25 VITALS — WEIGHT: 293 LBS | DIASTOLIC BLOOD PRESSURE: 76 MMHG | BODY MASS INDEX: 45 KG/M2 | SYSTOLIC BLOOD PRESSURE: 120 MMHG

## 2020-01-25 DIAGNOSIS — Z36.9 PRENATAL SCREENING ENCOUNTER: Primary | ICD-10-CM

## 2020-01-25 DIAGNOSIS — Z34.83 ENCOUNTER FOR SUPERVISION OF OTHER NORMAL PREGNANCY IN THIRD TRIMESTER: ICD-10-CM

## 2020-01-25 DIAGNOSIS — R05.9 COUGH: Primary | ICD-10-CM

## 2020-01-25 PROCEDURE — 81002 URINALYSIS NONAUTO W/O SCOPE: CPT | Performed by: OBSTETRICS & GYNECOLOGY

## 2020-01-25 PROCEDURE — 99214 OFFICE O/P EST MOD 30 MIN: CPT | Performed by: INTERNAL MEDICINE

## 2020-01-25 RX ORDER — PREDNISONE 10 MG/1
TABLET ORAL
Qty: 11 TABLET | Refills: 0 | Status: SHIPPED | OUTPATIENT
Start: 2020-01-25 | End: 2020-02-08 | Stop reason: ALTCHOICE

## 2020-01-25 NOTE — PROGRESS NOTES
[facility]    CHIEF COMPLAINT:  Patient presents with:  ER F/U: 2 week ago - Pneumonia        HISTORY OF PRESENT ILLNESS:  Complains of cough. Seen in er about 2 weeks ago for fever and cough and flu like symptoms.  She tested negative for the flu Lot# [id number] Numeric    Mult[employer]ix Expiration Date 05/31/2021 Date            ASSESSMENT AND PLAN:  1. Cough  Persistent bronchitis and bronchospasm. States benzonatate did not work before. Take prednisone. - predniSONE 10 MG Oral Tab;  Take 20mg daily

## 2020-02-08 ENCOUNTER — ROUTINE PRENATAL (OUTPATIENT)
Dept: OBGYN CLINIC | Facility: CLINIC | Age: 25
End: 2020-02-08
Payer: COMMERCIAL

## 2020-02-08 ENCOUNTER — TELEPHONE (OUTPATIENT)
Dept: OBGYN CLINIC | Facility: CLINIC | Age: 25
End: 2020-02-08

## 2020-02-08 VITALS — SYSTOLIC BLOOD PRESSURE: 118 MMHG | BODY MASS INDEX: 46 KG/M2 | DIASTOLIC BLOOD PRESSURE: 64 MMHG | WEIGHT: 293 LBS

## 2020-02-08 DIAGNOSIS — Z36.9 PRENATAL SCREENING ENCOUNTER: Primary | ICD-10-CM

## 2020-02-08 DIAGNOSIS — Z34.83 ENCOUNTER FOR SUPERVISION OF OTHER NORMAL PREGNANCY IN THIRD TRIMESTER: ICD-10-CM

## 2020-02-08 LAB — MULTISTIX LOT#: NORMAL NUMERIC

## 2020-02-08 PROCEDURE — 81002 URINALYSIS NONAUTO W/O SCOPE: CPT | Performed by: OBSTETRICS & GYNECOLOGY

## 2020-02-08 NOTE — TELEPHONE ENCOUNTER
Received FMLA papers pd$25.00 please call patient when complete and also fax.  Placed in nurse bin in [place] for completion

## 2020-02-10 ENCOUNTER — TELEPHONE (OUTPATIENT)
Dept: OBGYN CLINIC | Facility: CLINIC | Age: 25
End: 2020-02-10

## 2020-02-10 ENCOUNTER — HOSPITAL ENCOUNTER (OUTPATIENT)
Facility: HOSPITAL | Age: 25
Setting detail: OBSERVATION
Discharge: HOME | End: 2020-02-10
Attending: OBSTETRICS & GYNECOLOGY | Admitting: OBSTETRICS & GYNECOLOGY
Payer: COMMERCIAL

## 2020-02-10 VITALS
RESPIRATION RATE: 18 BRPM | SYSTOLIC BLOOD PRESSURE: 136 MMHG | HEIGHT: 69 IN | HEART RATE: 85 BPM | TEMPERATURE: 98 F | WEIGHT: 293 LBS | BODY MASS INDEX: 43.4 KG/M2 | DIASTOLIC BLOOD PRESSURE: 71 MMHG

## 2020-02-10 PROBLEM — Z34.90 PREGNANCY: Status: ACTIVE | Noted: 2020-02-10

## 2020-02-10 PROBLEM — Z34.90 PREGNANCY (HCC): Status: ACTIVE | Noted: 2020-02-10

## 2020-02-10 LAB
BILIRUBIN URINE: NEGATIVE
CONTROL RUN WITHIN 24 HOURS?: YES
GLUCOSE URINE: NEGATIVE
NITRITE URINE: NEGATIVE
PH URINE: 6 (ref 5–8)
PROTEIN URINE: NEGATIVE
SPEC GRAVITY: 1.02 (ref 1–1.03)
URINE CLARITY: CLEAR
URINE COLOR: YELLOW
UROBILINOGEN URINE: 0.2

## 2020-02-10 PROCEDURE — 59025 FETAL NON-STRESS TEST: CPT

## 2020-02-10 PROCEDURE — 81002 URINALYSIS NONAUTO W/O SCOPE: CPT

## 2020-02-10 PROCEDURE — 99212 OFFICE O/P EST SF 10 MIN: CPT

## 2020-02-10 NOTE — NST
Nonstress Test   Patient: Jayson Hernandez    Gestation: 32w0d    NST:       Variability: Moderate           Accelerations: Yes           Decelerations: None            Baseline: 135 BPM           Uterine Irritability: No           Contractions: Not presen

## 2020-02-10 NOTE — TELEPHONE ENCOUNTER
G6/P 2123 GA 32 wks patient complaining of period-like cramping that started at 1345. States they are a 6-7/10 on the pain scale. Constant pain, but varies in intensity. Can be sharp at times. No vaginal bleeding or LOF. + nausea. Denies fever/chills.  Pain

## 2020-02-10 NOTE — PROGRESS NOTES
Pt is a 30year old female admitted to TRG3/TRG3-A. Patient presents with:  R/o  Labor: c/o cramping since 1400 today. Denies LOF or bleeding.  + fetal movement     Pt is [id number] 32w0d intra-uterine pregnancy. History obtained, consents signed.

## 2020-02-10 NOTE — PROGRESS NOTES
Here to rule out  contractions.     None noted   fht 140, appropriate for gestational age  categ 1  No contractions    sg 1.025, encourage fluid    Keep follow up appt

## 2020-02-11 ENCOUNTER — OFFICE VISIT (OUTPATIENT)
Dept: INTERNAL MEDICINE CLINIC | Facility: CLINIC | Age: 25
End: 2020-02-11
Payer: COMMERCIAL

## 2020-02-11 ENCOUNTER — LAB ENCOUNTER (OUTPATIENT)
Dept: LAB | Age: 25
End: 2020-02-11
Attending: OBSTETRICS & GYNECOLOGY
Payer: COMMERCIAL

## 2020-02-11 VITALS
WEIGHT: 293 LBS | BODY MASS INDEX: 43.4 KG/M2 | SYSTOLIC BLOOD PRESSURE: 126 MMHG | DIASTOLIC BLOOD PRESSURE: 68 MMHG | RESPIRATION RATE: 16 BRPM | HEIGHT: 69 IN | HEART RATE: 76 BPM | OXYGEN SATURATION: 98 % | TEMPERATURE: 97 F

## 2020-02-11 DIAGNOSIS — Z34.83 ENCOUNTER FOR SUPERVISION OF OTHER NORMAL PREGNANCY IN THIRD TRIMESTER: ICD-10-CM

## 2020-02-11 DIAGNOSIS — R05.9 COUGH: Primary | ICD-10-CM

## 2020-02-11 DIAGNOSIS — R09.82 POST-NASAL DRIP: ICD-10-CM

## 2020-02-11 PROCEDURE — 99213 OFFICE O/P EST LOW 20 MIN: CPT | Performed by: INTERNAL MEDICINE

## 2020-02-11 PROCEDURE — 36415 COLL VENOUS BLD VENIPUNCTURE: CPT | Performed by: OBSTETRICS & GYNECOLOGY

## 2020-02-11 PROCEDURE — 87389 HIV-1 AG W/HIV-1&-2 AB AG IA: CPT | Performed by: OBSTETRICS & GYNECOLOGY

## 2020-02-11 NOTE — PROGRESS NOTES
Darwin Mccartney    CHIEF COMPLAINT:  Patient presents with: Follow - Up: continues to have productive cough but not as frequent as before. HISTORY OF PRESENT ILLNESS:  Here for follow up. Feeling better. Cough has improved.  Still has it but drip  Monitor. Not bothering her for now. Return in about 1 month (around 3/11/2020) for follow up.       Logan Olivera MD

## 2020-02-19 ENCOUNTER — HOSPITAL ENCOUNTER (OUTPATIENT)
Age: 25
Discharge: HOME | End: 2020-02-19
Attending: EMERGENCY MEDICINE
Payer: COMMERCIAL

## 2020-02-19 ENCOUNTER — APPOINTMENT (OUTPATIENT)
Dept: GENERAL RADIOLOGY | Age: 25
End: 2020-02-19
Attending: EMERGENCY MEDICINE
Payer: COMMERCIAL

## 2020-02-19 VITALS
HEIGHT: 69 IN | RESPIRATION RATE: 16 BRPM | TEMPERATURE: 98 F | WEIGHT: 293 LBS | DIASTOLIC BLOOD PRESSURE: 77 MMHG | HEART RATE: 88 BPM | BODY MASS INDEX: 43.4 KG/M2 | SYSTOLIC BLOOD PRESSURE: 134 MMHG | OXYGEN SATURATION: 99 %

## 2020-02-19 DIAGNOSIS — S93.602A SPRAIN OF LEFT FOOT, INITIAL ENCOUNTER: Primary | ICD-10-CM

## 2020-02-19 PROCEDURE — 99213 OFFICE O/P EST LOW 20 MIN: CPT

## 2020-02-19 PROCEDURE — 73630 X-RAY EXAM OF FOOT: CPT | Performed by: EMERGENCY MEDICINE

## 2020-02-19 NOTE — ED PROVIDER NOTES
Patient Seen in: [facility]      History   Patient presents with:  Lower Extremity Injury    Stated Complaint: left foot injury x1 day    HPI    61-year-old female presents emergency room with chief complaint of left foot p Systems    Positive for stated complaint: left foot injury x1 day  Other systems are as noted in HPI. Constitutional and vital signs reviewed. All other systems reviewed and negative except as noted above.     Physical Exam     ED Triage Vitals [02/19 days          Medications Prescribed:  Current Discharge Medication List

## 2020-02-19 NOTE — ED INITIAL ASSESSMENT (HPI)
Last night, while taking out the garbage, pt injured her left heel. Patient unsure how the injury occurred. Took Tylenol at 0200 today. Patient is 33.5 weeks pregnant.

## 2020-02-21 NOTE — TELEPHONE ENCOUNTER
Paperwork completed. Sent to scanning. Faxed to [phone]. Copy in [place]nes Seg[place]. Patient notified.

## 2020-02-22 ENCOUNTER — ROUTINE PRENATAL (OUTPATIENT)
Dept: OBGYN CLINIC | Facility: CLINIC | Age: 25
End: 2020-02-22
Payer: COMMERCIAL

## 2020-02-22 VITALS — WEIGHT: 293 LBS | DIASTOLIC BLOOD PRESSURE: 80 MMHG | SYSTOLIC BLOOD PRESSURE: 128 MMHG | BODY MASS INDEX: 47 KG/M2

## 2020-02-22 DIAGNOSIS — Z36.9 PRENATAL SCREENING ENCOUNTER: ICD-10-CM

## 2020-02-22 DIAGNOSIS — A60.9 HSV (HERPES SIMPLEX VIRUS) ANOGENITAL INFECTION: ICD-10-CM

## 2020-02-22 DIAGNOSIS — O99.210 OBESITY AFFECTING PREGNANCY, ANTEPARTUM: ICD-10-CM

## 2020-02-22 DIAGNOSIS — Z87.59 HISTORY OF GESTATIONAL HYPERTENSION: ICD-10-CM

## 2020-02-22 DIAGNOSIS — Z34.93 ENCOUNTER FOR SUPERVISION OF NORMAL PREGNANCY IN THIRD TRIMESTER, UNSPECIFIED GRAVIDITY: Primary | ICD-10-CM

## 2020-02-22 PROCEDURE — 81002 URINALYSIS NONAUTO W/O SCOPE: CPT | Performed by: OBSTETRICS & GYNECOLOGY

## 2020-02-22 RX ORDER — VALACYCLOVIR HYDROCHLORIDE 500 MG/1
500 TABLET, FILM COATED ORAL 2 TIMES DAILY
Qty: 60 TABLET | Refills: 1 | Status: SHIPPED | OUTPATIENT
Start: 2020-02-22

## 2020-02-22 NOTE — PROGRESS NOTES
MINOO  Doing well, +FM  Denies LOF/VB/uctx  Rh negative s/p Rhogam, TDAP received at work per patient, EPDS 0  Fetal movement count given  Obesity, Body mass index is 47.26 kg/m². growth scan at next visit. nst weekly at 36 weeks. Hx of GTN in 2016.  Contin

## 2020-02-22 NOTE — PATIENT INSTRUCTIONS
Tdap Vaccine: What You Need To Know    1. Why Get Vaccinated:    · Tetanus, diphtheria, and pertussis can be very serious diseases, even for adolescents and adults. Tdap vaccine can protect us from these diseases.     · Tetanus (Lockjaw) causes painful mu tetanus infection. FETAL MOVEMENT CHART    Begin counting fetal movements at 32 weeks of pregnancy. You may find that your baby is more active after eating or drinking.        We want you to time how long it takes to feel 10 movements (kimberly fl

## 2020-02-27 ENCOUNTER — ULTRASOUND ENCOUNTER (OUTPATIENT)
Dept: OBGYN CLINIC | Facility: CLINIC | Age: 25
End: 2020-02-27
Payer: COMMERCIAL

## 2020-02-27 ENCOUNTER — ROUTINE PRENATAL (OUTPATIENT)
Dept: OBGYN CLINIC | Facility: CLINIC | Age: 25
End: 2020-02-27
Payer: COMMERCIAL

## 2020-02-27 VITALS — BODY MASS INDEX: 47 KG/M2 | SYSTOLIC BLOOD PRESSURE: 122 MMHG | DIASTOLIC BLOOD PRESSURE: 78 MMHG | WEIGHT: 293 LBS

## 2020-02-27 DIAGNOSIS — Z36.9 PRENATAL SCREENING ENCOUNTER: Primary | ICD-10-CM

## 2020-02-27 DIAGNOSIS — Z34.93 ENCOUNTER FOR SUPERVISION OF NORMAL PREGNANCY IN THIRD TRIMESTER, UNSPECIFIED GRAVIDITY: ICD-10-CM

## 2020-02-27 DIAGNOSIS — O36.63X0 EXCESSIVE FETAL GROWTH AFFECTING MANAGEMENT OF PREGNANCY IN THIRD TRIMESTER, SINGLE OR UNSPECIFIED FETUS: ICD-10-CM

## 2020-02-27 LAB — MULTISTIX LOT#: NORMAL NUMERIC

## 2020-02-27 PROCEDURE — 81002 URINALYSIS NONAUTO W/O SCOPE: CPT | Performed by: OBSTETRICS & GYNECOLOGY

## 2020-02-27 PROCEDURE — 76816 OB US FOLLOW-UP PER FETUS: CPT | Performed by: OBSTETRICS & GYNECOLOGY

## 2020-02-28 NOTE — PROGRESS NOTES
No C/O, good FM  Scan: good growth, 58ZR %ile  Cephalic  MARIELY 10.55 cm  SVE and GBS next visit  1 week

## 2020-03-07 ENCOUNTER — ROUTINE PRENATAL (OUTPATIENT)
Dept: OBGYN CLINIC | Facility: CLINIC | Age: 25
End: 2020-03-07
Payer: COMMERCIAL

## 2020-03-07 ENCOUNTER — APPOINTMENT (OUTPATIENT)
Dept: OBGYN CLINIC | Facility: CLINIC | Age: 25
End: 2020-03-07
Payer: COMMERCIAL

## 2020-03-07 ENCOUNTER — OFFICE VISIT (OUTPATIENT)
Dept: FAMILY MEDICINE CLINIC | Facility: CLINIC | Age: 25
End: 2020-03-07
Payer: COMMERCIAL

## 2020-03-07 VITALS
WEIGHT: 293 LBS | BODY MASS INDEX: 47 KG/M2 | SYSTOLIC BLOOD PRESSURE: 137 MMHG | TEMPERATURE: 98 F | OXYGEN SATURATION: 99 % | DIASTOLIC BLOOD PRESSURE: 74 MMHG | HEART RATE: 74 BPM

## 2020-03-07 VITALS — BODY MASS INDEX: 47 KG/M2 | WEIGHT: 293 LBS | DIASTOLIC BLOOD PRESSURE: 86 MMHG | SYSTOLIC BLOOD PRESSURE: 132 MMHG

## 2020-03-07 DIAGNOSIS — J01.40 ACUTE NON-RECURRENT PANSINUSITIS: Primary | ICD-10-CM

## 2020-03-07 DIAGNOSIS — R05.9 COUGH: ICD-10-CM

## 2020-03-07 DIAGNOSIS — Z36.9 PRENATAL SCREENING ENCOUNTER: Primary | ICD-10-CM

## 2020-03-07 DIAGNOSIS — O09.523 MULTIGRAVIDA OF ADVANCED MATERNAL AGE IN THIRD TRIMESTER: ICD-10-CM

## 2020-03-07 LAB — MULTISTIX LOT#: NORMAL NUMERIC

## 2020-03-07 PROCEDURE — 81002 URINALYSIS NONAUTO W/O SCOPE: CPT | Performed by: OBSTETRICS & GYNECOLOGY

## 2020-03-07 PROCEDURE — 87653 STREP B DNA AMP PROBE: CPT | Performed by: OBSTETRICS & GYNECOLOGY

## 2020-03-07 PROCEDURE — 59025 FETAL NON-STRESS TEST: CPT | Performed by: OBSTETRICS & GYNECOLOGY

## 2020-03-07 PROCEDURE — 99213 OFFICE O/P EST LOW 20 MIN: CPT | Performed by: NURSE PRACTITIONER

## 2020-03-07 PROCEDURE — 1111F DSCHRG MED/CURRENT MED MERGE: CPT | Performed by: OBSTETRICS & GYNECOLOGY

## 2020-03-07 PROCEDURE — 87081 CULTURE SCREEN ONLY: CPT | Performed by: OBSTETRICS & GYNECOLOGY

## 2020-03-07 RX ORDER — AMOXICILLIN 875 MG/1
875 TABLET, COATED ORAL 2 TIMES DAILY
Qty: 20 TABLET | Refills: 0 | Status: SHIPPED | OUTPATIENT
Start: 2020-03-07 | End: 2020-03-17

## 2020-03-07 NOTE — PATIENT INSTRUCTIONS
-push fluids, rest and use humidifier overnight for next couple of days  -if you develop a fever, start antibiotic  -if worsening in symptoms tomorrow, can start antibiotic  -most viruses run their course in 7-10 days  -if you start antibiotic: Take antibi your doctor may prescribe medications to help relieve them. Date Last Reviewed: 10/1/2016  © 6622-2122 [employer]. 1407 [facility], [address] [place] [address]. All rights reserved.  This information is not intended as a substitute for profess

## 2020-03-07 NOTE — PROGRESS NOTES
MINOO - 35w5d    Doing well, +FM  Denies LOF/VB/uctx  /86   Wt (!) 320 lb 9.6 oz (145.4 kg)   LMP 07/10/2019 (Within Days)   BMI 47.34 kg/m²    GBS collected  Antepartum problem list:  1. Obesity: reactive NST   2. IUP at 35 weeks: GBS collected  3.  HS

## 2020-03-07 NOTE — PROGRESS NOTES
CHIEF COMPLAINT:   Patient presents with:  Sinus Problem: x6 days      HPI:   Kelli Robertson is a 30year old female who is 35 weeks pregnant. She presents for sinus congestion for  6  days. Symptoms have been worsening since onset.  Sinus congestion/pa • Thyroid Disorder Mother    • Diabetes Maternal Grandmother    • Breast Cancer Maternal Grandmother    • Cancer Maternal Grandmother         uterine and pancreatic   • Neurological Disorder Maternal Grandmother         ALS   • Psychiatric Father    • Hype No orders of the defined types were placed in this encounter. Discussed with pt comfort care measures for home. Advised of typical length of viral illnesses. Printed prescription for amox provided to start if symptoms are worsening.  She expressed unders Rinses help keep your sinuses and nose moist. Mix a teaspoon of salt in 8 ounces of fresh, warm water. Use a bulb syringe to gently squirt the water into your nose a few times a day. You can also buy ready-made saline nasal sprays.   Apply hot or cold packs

## 2020-03-14 ENCOUNTER — ROUTINE PRENATAL (OUTPATIENT)
Dept: OBGYN CLINIC | Facility: CLINIC | Age: 25
End: 2020-03-14
Payer: COMMERCIAL

## 2020-03-14 VITALS
WEIGHT: 293 LBS | BODY MASS INDEX: 43.9 KG/M2 | DIASTOLIC BLOOD PRESSURE: 80 MMHG | HEIGHT: 68.5 IN | SYSTOLIC BLOOD PRESSURE: 122 MMHG

## 2020-03-14 DIAGNOSIS — O99.210 OBESITY AFFECTING PREGNANCY, ANTEPARTUM: ICD-10-CM

## 2020-03-14 DIAGNOSIS — Z87.59 HISTORY OF GESTATIONAL HYPERTENSION: ICD-10-CM

## 2020-03-14 DIAGNOSIS — Z36.9 PRENATAL SCREENING ENCOUNTER: Primary | ICD-10-CM

## 2020-03-14 LAB — MULTISTIX LOT#: NORMAL NUMERIC

## 2020-03-14 PROCEDURE — 81002 URINALYSIS NONAUTO W/O SCOPE: CPT | Performed by: OBSTETRICS & GYNECOLOGY

## 2020-03-14 PROCEDURE — 59025 FETAL NON-STRESS TEST: CPT | Performed by: OBSTETRICS & GYNECOLOGY

## 2020-03-14 NOTE — PROGRESS NOTES
No C/O, good FM  NST rreactive  Wants IOL, scheduled 3/30/20 at 0715  Will check cervix prior to IOL  Watch BP  1 week

## 2020-03-16 ENCOUNTER — TELEPHONE (OUTPATIENT)
Dept: INTERNAL MEDICINE CLINIC | Facility: CLINIC | Age: 25
End: 2020-03-16

## 2020-03-16 NOTE — TELEPHONE ENCOUNTER
Please call pt and see how she is doing. She is pregnant. If her cough is much better she does not need to see me tomorrow for a visit. Would advise against coming into the office at this time unless she is not feeling better.

## 2020-03-17 ENCOUNTER — OFFICE VISIT (OUTPATIENT)
Dept: INTERNAL MEDICINE CLINIC | Facility: CLINIC | Age: 25
End: 2020-03-17
Payer: COMMERCIAL

## 2020-03-17 VITALS
BODY MASS INDEX: 43.9 KG/M2 | TEMPERATURE: 98 F | DIASTOLIC BLOOD PRESSURE: 78 MMHG | OXYGEN SATURATION: 98 % | SYSTOLIC BLOOD PRESSURE: 130 MMHG | HEIGHT: 68.5 IN | WEIGHT: 293 LBS | RESPIRATION RATE: 16 BRPM | HEART RATE: 72 BPM

## 2020-03-17 DIAGNOSIS — J98.01 COLD INDUCED BRONCHOSPASM: Primary | ICD-10-CM

## 2020-03-17 DIAGNOSIS — R09.82 POST-NASAL DRIP: ICD-10-CM

## 2020-03-17 DIAGNOSIS — R05.9 COUGH: ICD-10-CM

## 2020-03-17 PROCEDURE — 99213 OFFICE O/P EST LOW 20 MIN: CPT | Performed by: INTERNAL MEDICINE

## 2020-03-17 RX ORDER — ALBUTEROL SULFATE 90 UG/1
AEROSOL, METERED RESPIRATORY (INHALATION)
Qty: 42.5 G | Refills: 0 | OUTPATIENT
Start: 2020-03-17

## 2020-03-17 RX ORDER — MOMETASONE 50 UG/1
2 SPRAY, METERED NASAL DAILY
Qty: 1 BOTTLE | Refills: 0 | Status: SHIPPED | OUTPATIENT
Start: 2020-03-17 | End: 2020-04-16

## 2020-03-17 RX ORDER — ALBUTEROL SULFATE 90 UG/1
2 AEROSOL, METERED RESPIRATORY (INHALATION) EVERY 4 HOURS PRN
Qty: 1 INHALER | Refills: 0 | Status: SHIPPED | OUTPATIENT
Start: 2020-03-17 | End: 2020-04-16

## 2020-03-17 NOTE — TELEPHONE ENCOUNTER
Received request for a 90 day supply of albuterol Inhaler. Pt was given an albuterol inhaler to use PRN for cold induced sx.  Refused 90 day request.

## 2020-03-17 NOTE — PROGRESS NOTES
Established Patient Progress Note  Chief Complaint:   Patient presents with: Follow - Up: continues to exp cough with white sputum. Has sinus mckay, Went to Urgent care on 3/7given antibiotics, still taking. Pt is pregnant.        HPI:   This is a 30 year o Budesonide (PULMICORT FLEXHALER) 180 MCG/ACT Inhalation Aerosol Powder, Breath Activated Inhale 1 puff into the lungs 2 (two) times daily. 1 Inhaler 0   • Mometasone Furoate (NASONEX) 50 MCG/ACT Nasal Suspension 2 sprays by Nasal route daily.  1 Bottle 0 and breath sounds normal. No respiratory distress. She has no wheezes. She has no rales. Abdominal: Soft. Bowel sounds are normal.   Lymphadenopathy:     She has no cervical adenopathy. Skin: Skin is warm and dry. She is not diaphoretic.       ASSESSMEN

## 2020-03-21 ENCOUNTER — ROUTINE PRENATAL (OUTPATIENT)
Dept: OBGYN CLINIC | Facility: CLINIC | Age: 25
End: 2020-03-21
Payer: COMMERCIAL

## 2020-03-21 ENCOUNTER — APPOINTMENT (OUTPATIENT)
Dept: OBGYN CLINIC | Facility: CLINIC | Age: 25
End: 2020-03-21
Payer: COMMERCIAL

## 2020-03-21 VITALS — DIASTOLIC BLOOD PRESSURE: 80 MMHG | SYSTOLIC BLOOD PRESSURE: 126 MMHG | WEIGHT: 293 LBS | BODY MASS INDEX: 49 KG/M2

## 2020-03-21 DIAGNOSIS — Z36.9 PRENATAL SCREENING ENCOUNTER: Primary | ICD-10-CM

## 2020-03-21 DIAGNOSIS — E66.01 MORBID OBESITY (HCC): ICD-10-CM

## 2020-03-21 DIAGNOSIS — Z34.83 ENCOUNTER FOR SUPERVISION OF OTHER NORMAL PREGNANCY IN THIRD TRIMESTER: ICD-10-CM

## 2020-03-21 DIAGNOSIS — O99.210 OBESITY AFFECTING PREGNANCY, ANTEPARTUM: ICD-10-CM

## 2020-03-21 LAB
GLUCOSE (URINE DIPSTICK): NEGATIVE MG/DL
MULTISTIX LOT#: NORMAL NUMERIC

## 2020-03-21 PROCEDURE — 81002 URINALYSIS NONAUTO W/O SCOPE: CPT | Performed by: OBSTETRICS & GYNECOLOGY

## 2020-03-21 PROCEDURE — 59025 FETAL NON-STRESS TEST: CPT | Performed by: OBSTETRICS & GYNECOLOGY

## 2020-03-21 NOTE — PROGRESS NOTES
MINOO  Doing well, +FM  intermittent contractions  no LOF, VB    SVE: 1/30/high    Patient Active Problem List:     Supervision of normal pregnancy     Migraines     BMI 40.0-44.9, adult (HCC)     Other headache syndrome     Obesity affecting pregnancy, ante

## 2020-03-25 ENCOUNTER — TELEPHONE (OUTPATIENT)
Dept: OBGYN UNIT | Facility: HOSPITAL | Age: 25
End: 2020-03-25

## 2020-03-26 ENCOUNTER — TELEPHONE (OUTPATIENT)
Dept: OBGYN CLINIC | Facility: CLINIC | Age: 25
End: 2020-03-26

## 2020-03-26 NOTE — TELEPHONE ENCOUNTER
Patient scheduled for MINOO tomorrow in office. Seen by PCP on 3/17/20 with c/o persistent cough. Contacted patient. Inquired about how she is doing. She states that her cough has improved and that her PCP believes she may be developing asthma.  Denies any

## 2020-03-27 ENCOUNTER — APPOINTMENT (OUTPATIENT)
Dept: OBGYN CLINIC | Facility: CLINIC | Age: 25
End: 2020-03-27
Payer: COMMERCIAL

## 2020-03-27 ENCOUNTER — ROUTINE PRENATAL (OUTPATIENT)
Dept: OBGYN CLINIC | Facility: CLINIC | Age: 25
End: 2020-03-27
Payer: COMMERCIAL

## 2020-03-27 VITALS — SYSTOLIC BLOOD PRESSURE: 138 MMHG | BODY MASS INDEX: 49 KG/M2 | WEIGHT: 293 LBS | DIASTOLIC BLOOD PRESSURE: 96 MMHG

## 2020-03-27 DIAGNOSIS — Z34.83 ENCOUNTER FOR SUPERVISION OF OTHER NORMAL PREGNANCY IN THIRD TRIMESTER: ICD-10-CM

## 2020-03-27 DIAGNOSIS — O99.210 OBESITY AFFECTING PREGNANCY, ANTEPARTUM: Primary | ICD-10-CM

## 2020-03-27 LAB — MULTISTIX LOT#: NORMAL NUMERIC

## 2020-03-27 PROCEDURE — 59025 FETAL NON-STRESS TEST: CPT | Performed by: OBSTETRICS & GYNECOLOGY

## 2020-03-27 PROCEDURE — 81002 URINALYSIS NONAUTO W/O SCOPE: CPT | Performed by: OBSTETRICS & GYNECOLOGY

## 2020-03-27 NOTE — PROGRESS NOTES
MINOO - 38w4d    Doing well, +FM  Denies LOF/VB/uctx  BP (!) 138/96   Wt (!) 325 lb 3.2 oz (147.5 kg)   LMP 07/10/2019 (Within Days)   BMI 48.73 kg/m²    repeat BP normal  GBS neg  Declined SVE  IOL on Monday

## 2020-03-30 ENCOUNTER — HOSPITAL ENCOUNTER (INPATIENT)
Facility: HOSPITAL | Age: 25
LOS: 2 days | Discharge: HOME | End: 2020-04-01
Attending: OBSTETRICS & GYNECOLOGY | Admitting: OBSTETRICS & GYNECOLOGY
Payer: COMMERCIAL

## 2020-03-30 ENCOUNTER — APPOINTMENT (OUTPATIENT)
Dept: OBGYN CLINIC | Facility: HOSPITAL | Age: 25
End: 2020-03-30
Payer: COMMERCIAL

## 2020-03-30 PROCEDURE — 10907ZC DRAINAGE OF AMNIOTIC FLUID, THERAPEUTIC FROM PRODUCTS OF CONCEPTION, VIA NATURAL OR ARTIFICIAL OPENING: ICD-10-PCS | Performed by: OBSTETRICS & GYNECOLOGY

## 2020-03-30 PROCEDURE — 59400 OBSTETRICAL CARE: CPT | Performed by: OBSTETRICS & GYNECOLOGY

## 2020-03-30 PROCEDURE — 3E0334Z INTRODUCTION OF SERUM, TOXOID AND VACCINE INTO PERIPHERAL VEIN, PERCUTANEOUS APPROACH: ICD-10-PCS | Performed by: OBSTETRICS & GYNECOLOGY

## 2020-03-30 PROCEDURE — 3E033VJ INTRODUCTION OF OTHER HORMONE INTO PERIPHERAL VEIN, PERCUTANEOUS APPROACH: ICD-10-PCS | Performed by: OBSTETRICS & GYNECOLOGY

## 2020-03-30 RX ORDER — DOCUSATE SODIUM 100 MG/1
100 CAPSULE, LIQUID FILLED ORAL
Status: DISCONTINUED | OUTPATIENT
Start: 2020-03-30 | End: 2020-04-01

## 2020-03-30 RX ORDER — ZOLPIDEM TARTRATE 5 MG/1
5 TABLET ORAL NIGHTLY PRN
Status: DISCONTINUED | OUTPATIENT
Start: 2020-03-30 | End: 2020-04-01

## 2020-03-30 RX ORDER — BISACODYL 10 MG
10 SUPPOSITORY, RECTAL RECTAL ONCE AS NEEDED
Status: ACTIVE | OUTPATIENT
Start: 2020-03-30 | End: 2020-03-30

## 2020-03-30 RX ORDER — ENOXAPARIN SODIUM 100 MG/ML
40 INJECTION SUBCUTANEOUS NIGHTLY
Status: DISCONTINUED | OUTPATIENT
Start: 2020-03-30 | End: 2020-04-01

## 2020-03-30 RX ORDER — CETIRIZINE HYDROCHLORIDE 10 MG/1
10 TABLET ORAL DAILY PRN
COMMUNITY

## 2020-03-30 RX ORDER — IBUPROFEN 600 MG/1
600 TABLET ORAL EVERY 6 HOURS
Status: DISCONTINUED | OUTPATIENT
Start: 2020-03-30 | End: 2020-04-01

## 2020-03-30 RX ORDER — TRISODIUM CITRATE DIHYDRATE AND CITRIC ACID MONOHYDRATE 500; 334 MG/5ML; MG/5ML
30 SOLUTION ORAL AS NEEDED
Status: DISCONTINUED | OUTPATIENT
Start: 2020-03-30 | End: 2020-03-30 | Stop reason: HOSPADM

## 2020-03-30 RX ORDER — ACETAMINOPHEN 325 MG/1
650 TABLET ORAL EVERY 6 HOURS PRN
Status: DISCONTINUED | OUTPATIENT
Start: 2020-03-30 | End: 2020-04-01

## 2020-03-30 RX ORDER — ACETAMINOPHEN 500 MG
500 TABLET ORAL ONCE AS NEEDED
Status: DISCONTINUED | OUTPATIENT
Start: 2020-03-30 | End: 2020-03-30 | Stop reason: HOSPADM

## 2020-03-30 RX ORDER — ENOXAPARIN SODIUM 100 MG/ML
40 INJECTION SUBCUTANEOUS DAILY
Status: DISCONTINUED | OUTPATIENT
Start: 2020-03-31 | End: 2020-03-30

## 2020-03-30 RX ORDER — DEXTROSE, SODIUM CHLORIDE, SODIUM LACTATE, POTASSIUM CHLORIDE, AND CALCIUM CHLORIDE 5; .6; .31; .03; .02 G/100ML; G/100ML; G/100ML; G/100ML; G/100ML
INJECTION, SOLUTION INTRAVENOUS AS NEEDED
Status: DISCONTINUED | OUTPATIENT
Start: 2020-03-30 | End: 2020-03-30 | Stop reason: HOSPADM

## 2020-03-30 RX ORDER — AMMONIA INHALANTS 0.04 G/.3ML
0.3 INHALANT RESPIRATORY (INHALATION) AS NEEDED
Status: DISCONTINUED | OUTPATIENT
Start: 2020-03-30 | End: 2020-03-30 | Stop reason: HOSPADM

## 2020-03-30 RX ORDER — IBUPROFEN 600 MG/1
600 TABLET ORAL ONCE AS NEEDED
Status: DISCONTINUED | OUTPATIENT
Start: 2020-03-30 | End: 2020-03-30 | Stop reason: HOSPADM

## 2020-03-30 RX ORDER — TERBUTALINE SULFATE 1 MG/ML
0.25 INJECTION, SOLUTION SUBCUTANEOUS AS NEEDED
Status: DISCONTINUED | OUTPATIENT
Start: 2020-03-30 | End: 2020-03-30 | Stop reason: HOSPADM

## 2020-03-30 RX ORDER — ALBUTEROL SULFATE 2.5 MG/3ML
2.5 SOLUTION RESPIRATORY (INHALATION) EVERY 4 HOURS PRN
Status: DISCONTINUED | OUTPATIENT
Start: 2020-03-30 | End: 2020-04-01

## 2020-03-30 RX ORDER — SIMETHICONE 80 MG
80 TABLET,CHEWABLE ORAL 3 TIMES DAILY PRN
Status: DISCONTINUED | OUTPATIENT
Start: 2020-03-30 | End: 2020-04-01

## 2020-03-30 RX ORDER — SODIUM CHLORIDE, SODIUM LACTATE, POTASSIUM CHLORIDE, CALCIUM CHLORIDE 600; 310; 30; 20 MG/100ML; MG/100ML; MG/100ML; MG/100ML
INJECTION, SOLUTION INTRAVENOUS CONTINUOUS
Status: DISCONTINUED | OUTPATIENT
Start: 2020-03-30 | End: 2020-03-30 | Stop reason: HOSPADM

## 2020-03-30 RX ORDER — EPHEDRINE SULFATE/0.9% NACL/PF 25 MG/5 ML
5 SYRINGE (ML) INTRAVENOUS AS NEEDED
Status: DISCONTINUED | OUTPATIENT
Start: 2020-03-30 | End: 2020-03-30

## 2020-03-30 RX ORDER — DIPHENHYDRAMINE HYDROCHLORIDE 50 MG/ML
12.5 INJECTION INTRAMUSCULAR; INTRAVENOUS EVERY 4 HOURS PRN
Status: DISCONTINUED | OUTPATIENT
Start: 2020-03-30 | End: 2020-03-30

## 2020-03-30 NOTE — PROGRESS NOTES
Pitocin, its purpose including side effects explained to pt and spouse. Questions answered. Pt verbalizes understanding and consent. Pitocin started per protocol.

## 2020-03-30 NOTE — PROGRESS NOTES
Pt is a  at 39 wk iup who is admitted to room 114 for IOL. EFM tested and applied. POC discussed and questions answered. General hx taken and general assessment done.

## 2020-03-30 NOTE — PLAN OF CARE
Problem: Patient/Family Goals  Goal: Patient/Family Long Term Goal  Description  Patient's Long Term Goal: adequate pain relief    Interventions:  -   - See additional Care Plan goals for specific interventions  Outcome: Progressing  Goal: Patient/Family

## 2020-03-30 NOTE — PROGRESS NOTES
Patient up to bathroom with assist x 2. Unable to void at this time. Patient transferred to mother/baby room  per wheelchair in stable condition with baby and personal belongings. Accompanied by significant other and staff.   Report given to mother/baby RN

## 2020-03-30 NOTE — L&D DELIVERY NOTE
Chikis Pitt Girl [[id number]]    Labor Events     labor?:  No   steroids?:  None  Antibiotics received during labor?:  No  Rupture date/time:  3/30/2020 0915     Rupture type:  AROM  Fluid color:  Clear  Induction:  AROM, Oxytocin  Indications Muscle tone: 2  2       Respiratory effort: 2  2       Total: 9  9          Apgars assigned by:  Paul Mccoy   disposition:  with mother     Skin to Skin    No data filed      Vaginal Count    Initial count RN:  Charlie Marcum RN  Initial count Te None     Mother and infant in good condition.

## 2020-03-30 NOTE — PLAN OF CARE
Problem: Patient/Family Goals  Goal: Patient/Family Long Term Goal  Description  Patient's Long Term Goal:     Interventions:  -   - See additional Care Plan goals for specific interventions  3/30/2020 1556 by Drena Buerger, RN  Outcome: Completed  3/ Problem: ANXIETY  Goal: Will report anxiety at manageable levels  Description  INTERVENTIONS:  - Administer medication as ordered  - Teach and rehearse alternative coping skills  - Provide emotional support with 1:1 interaction with staff  3/30/2020 6460

## 2020-03-30 NOTE — H&P
[facility]  History & Physical    Lui Vela Patient Status:  Inpatient    1995 MRN [id number]   Location [facility] Attending Nilda Bonilla MD    0 PCP Nathan Reeder MD     CC: patient is here for IOL    SUBJECT Surgical History:   Procedure Laterality Date   • TONSILLECTOMY       Family History:   Family History   Problem Relation Age of Onset   • Stroke Mother    • Cancer Mother         breast   • Psychiatric Mother    • Thyroid Disorder Mother    • Diabetes Mat bilaterally   Heart:   regular rate and rhythm, regular rate and rhythm, S1, S2 normal, no murmur, click, rub or gallop   Abdomen: FHT present   Fetal Surveillance:  140 BPM   Category I      Cervix: 2 cm/50%/-3,  AROM, clear fluid     Lab Review:  A neg,

## 2020-03-31 RX ORDER — ALBUTEROL SULFATE 90 UG/1
AEROSOL, METERED RESPIRATORY (INHALATION)
Qty: 8.5 G | Refills: 0 | OUTPATIENT
Start: 2020-03-31

## 2020-03-31 NOTE — DISCHARGE SUMMARY
[facility]  Discharge Summary    [facility] Patient Status:  inpatient    1995 MRN [id number]   Location 2215/2215-A Attending [facility] Mikie Segal Day # 1 PCP Ruma Burnett MD     Date of Admission: 3/30/2020    Date of Discharge: 20 Oral Tab  Take 1 tablet (500 mg total) by mouth 2 (two) times daily. Follow up Visits:  Follow-up with [facility] OBGYN in 6 weeks

## 2020-03-31 NOTE — PROGRESS NOTES
03/30/20 1920   Provider Notification   Reason for Communication   (BP upon admission 146/81; calling for parameters)   Provider Name Raymon Beck MD   Method of Communication Call   Response No new orders   Notification Time 1921

## 2020-03-31 NOTE — PROGRESS NOTES
[facility]  Post-Partum Progress Note    Delories Postal Patient Status:  Inpatient    1995 MRN [id number]   [facility] 2SW-J Attending Elsa Cortez MD   Hosp Day # 1 PCP Isatu Lim MD     SUBJECTIVE:    Postpartum Day 1:    T

## 2020-03-31 NOTE — PAYOR COMM NOTE
--------------  ADMISSION REVIEW     Payor: Layton LYONS #:  [id number]  Authorization Number: [id number]    Admit date: 3/30/20  Admit time: 8960       Admitting Physician: Siri Fonseca MD  Attending Physician:  Siri Fonseca MD  Primary Care P 1 SAB 2012 10w0d             Birth Comments: complete     Past Medical History:   Past Medical History:   Diagnosis Date   • Abnormal uterine bleeding     spotting and bleeding X 2   • Anxiety state, unspecified    • Asthma    • Bacterial vaginosis    • Ch Albuterol Sulfate HFA (PROAIR HFA) 108 (90 Base) MCG/ACT Inhalation Aero Soln, Inhale 2 puffs into the lungs every 4 (four) hours as needed for Wheezing (Cough). , Disp: 1 Inhaler, Rfl: 0  Spacer/Aero-Holding Chambers Does not apply Device, Utilize spacer f 3/30/2020 1856 Given 1 spray Topical Dimalibot, Estrella Linn, RN      docusate sodium (COLACE) cap 100 mg     Date Action Dose Route User    3/31/2020 0542 Given 100 mg Oral Den Bray RN    3/30/2020 1800 Given 100 mg Oral Dimalibot, Estrella Linn, RN      Enoxaparin     Shoulder Dystocia    No data filed       Anesthesia    Method:  None                                    Delivery    Delivery date/time:   3/30/20 15:18:00   Delivery type:  Normal spontaneous vaginal delivery     Details:       Delivery             Diane Mercedes Patient Status:  Inpatient    1995 MRN [id number]   Location 18[facility] Attending Rossy Massey MD   Hosp Day # 0 PCP Alejandra Hull MD         Pre Op Dx:  IUP at Term     Post Op Dx: Same - delivered Post-Partum Progress Note           Imelda Suárez Patient Status:  Inpatient    1995 MRN [id number]   [facility] 2SW-J Attending Erinn King MD   Hosp Day # 1 PCP Abdirashid Adams MD      SUBJECTIVE:     Postpartum Day 1:     The connie

## 2020-03-31 NOTE — PAYOR COMM NOTE
--------------  ADMISSION REVIEW     Payor: Delon HALL  Subscriber #:  [id number]  Authorization Number: [id number]    Admit date: 3/30/20  Admit time: 200 [facility]  History & Physical    CC: patient is here for IOL    SUBJECTIVE:    Ghazala Zelaya [80-96] 80  Resp:  [16] 16  BP: (132-149)/(81-84) 149/84    Lungs:   clear to auscultation bilaterally   Heart:   regular rate and rhythm, regular rate and rhythm, S1, S2 normal, no murmur, click, rub or gallop   Abdomen: FHT present   Fetal Surveillance: ELLIPTA) 100 MCG/ACT inhaler 1 puff     Date Action Dose Route     3/31/2020 0930 Given 1 puff Inhalation       ibuprofen (MOTRIN) tab 600 mg     Date Action Dose Route     3/31/2020 1204 Given 600 mg Oral     3/31/2020 0542 Given 600 mg Oral     3/30/2020

## 2020-03-31 NOTE — PROGRESS NOTES
Pt admit to room 2215 from L/D. Report received. Mother and baby together in room. Teaching Materials given and discussed plan of care.

## 2020-04-01 VITALS
TEMPERATURE: 98 F | WEIGHT: 293 LBS | DIASTOLIC BLOOD PRESSURE: 87 MMHG | BODY MASS INDEX: 43.4 KG/M2 | RESPIRATION RATE: 18 BRPM | SYSTOLIC BLOOD PRESSURE: 136 MMHG | HEIGHT: 69 IN | HEART RATE: 76 BPM

## 2020-04-01 PROBLEM — Z34.90 PREGNANCY: Status: RESOLVED | Noted: 2020-02-10 | Resolved: 2020-04-01

## 2020-04-01 PROBLEM — Z34.90 PREGNANCY (HCC): Status: RESOLVED | Noted: 2020-02-10 | Resolved: 2020-04-01

## 2020-04-01 NOTE — DISCHARGE SUMMARY
[facility]  Discharge Summary    [facility] Patient Status:  inpatient    1995 MRN [id number]   Location 2215/2215-A Attending [facility] 2315 Harpreet Segal Day # 2 PCP Ruma Burnett MD     Date of Admission: 3/30/2020    Date of Discharge: 20

## 2020-04-03 ENCOUNTER — TELEPHONE (OUTPATIENT)
Dept: OBGYN UNIT | Facility: HOSPITAL | Age: 25
End: 2020-04-03

## 2020-04-03 NOTE — PROGRESS NOTES
Outgoing cradle call placed. No answer. Left general voicemail message.  Will attempt a second call due to HTN

## 2020-04-03 NOTE — PROGRESS NOTES
Reviewed self and infant care w / mom, she verbalizes understanding of instructions reviewed. Encourage to follow up w/ MDs as directed and w/ questions/concerns.   Pt states she doesn't have high bp, sx of PIH reviewed, pt is already doing laundry and went

## 2020-04-06 ENCOUNTER — POSTPARTUM (OUTPATIENT)
Dept: OBGYN CLINIC | Facility: CLINIC | Age: 25
End: 2020-04-06
Payer: COMMERCIAL

## 2020-04-06 ENCOUNTER — TELEPHONE (OUTPATIENT)
Dept: OBGYN CLINIC | Facility: CLINIC | Age: 25
End: 2020-04-06

## 2020-04-06 VITALS
HEIGHT: 68.5 IN | HEART RATE: 90 BPM | BODY MASS INDEX: 43.9 KG/M2 | DIASTOLIC BLOOD PRESSURE: 98 MMHG | SYSTOLIC BLOOD PRESSURE: 142 MMHG | WEIGHT: 293 LBS | TEMPERATURE: 98 F

## 2020-04-06 DIAGNOSIS — O13.9 GESTATIONAL HYPERTENSION, ANTEPARTUM: Primary | ICD-10-CM

## 2020-04-06 PROCEDURE — 99213 OFFICE O/P EST LOW 20 MIN: CPT | Performed by: OBSTETRICS & GYNECOLOGY

## 2020-04-06 RX ORDER — LABETALOL 100 MG/1
100 TABLET, FILM COATED ORAL 2 TIMES DAILY
Qty: 60 TABLET | Refills: 0 | Status: SHIPPED | OUTPATIENT
Start: 2020-04-06 | End: 2020-04-07

## 2020-04-06 NOTE — TELEPHONE ENCOUNTER
Pt feet are still swollen and she was wondering how much longer is that going to last. Please advise

## 2020-04-06 NOTE — PROGRESS NOTES
C/O post partum swelling  /96    ROS: No Cardiac, Respiratory, GI,  or Neurological symptoms.   Headache for 4 days, ranges from 4-9, taking Ibuprofen, has history of Migraines  Infant Haliley doing well    PE:  Abdomen soft  Minimal bleeding  Sara Alexandar

## 2020-04-06 NOTE — TELEPHONE ENCOUNTER
If she does not have a cuff at home and has a headache or blurry vision then she should come for a BP check.    If no HA or blurry vision, then can monitor the swelling

## 2020-04-06 NOTE — TELEPHONE ENCOUNTER
31 y/o called c/o bilateral swelling in feet. She had  on 2020. She states she tries to stay off and elevate feet but she does have other children she is taking care of.  She states in the morning they are OK but the swelling increases gradually t

## 2020-04-07 RX ORDER — LABETALOL 100 MG/1
TABLET, FILM COATED ORAL
Qty: 180 TABLET | Refills: 0 | Status: SHIPPED | OUTPATIENT
Start: 2020-04-07 | End: 2020-05-12 | Stop reason: ALTCHOICE

## 2020-04-13 ENCOUNTER — POSTPARTUM (OUTPATIENT)
Dept: OBGYN CLINIC | Facility: CLINIC | Age: 25
End: 2020-04-13
Payer: COMMERCIAL

## 2020-04-13 VITALS
SYSTOLIC BLOOD PRESSURE: 126 MMHG | HEIGHT: 69 IN | DIASTOLIC BLOOD PRESSURE: 80 MMHG | BODY MASS INDEX: 43.4 KG/M2 | WEIGHT: 293 LBS

## 2020-04-13 DIAGNOSIS — O13.9 GESTATIONAL HYPERTENSION, ANTEPARTUM: Primary | ICD-10-CM

## 2020-04-13 PROCEDURE — 1111F DSCHRG MED/CURRENT MED MERGE: CPT | Performed by: OBSTETRICS & GYNECOLOGY

## 2020-04-13 PROCEDURE — 99213 OFFICE O/P EST LOW 20 MIN: CPT | Performed by: OBSTETRICS & GYNECOLOGY

## 2020-05-05 RX ORDER — LABETALOL 100 MG/1
TABLET, FILM COATED ORAL
Qty: 60 TABLET | Refills: 0 | OUTPATIENT
Start: 2020-05-05

## 2020-05-05 NOTE — TELEPHONE ENCOUNTER
Left message for patient to call back. 90 day supply was sent on 04/07 so patient should have enough until PP appointment.

## 2020-05-07 DIAGNOSIS — R51.9 CHRONIC DAILY HEADACHE: ICD-10-CM

## 2020-05-07 DIAGNOSIS — G43.019 MIGRAINE WITHOUT AURA, INTRACTABLE, WITHOUT STATUS MIGRAINOSUS: ICD-10-CM

## 2020-05-07 RX ORDER — TOPIRAMATE 25 MG/1
TABLET ORAL
Qty: 120 TABLET | Refills: 2 | OUTPATIENT
Start: 2020-05-07

## 2020-05-07 RX ORDER — RIZATRIPTAN BENZOATE 10 MG/1
TABLET ORAL
Qty: 12 TABLET | Refills: 2 | OUTPATIENT
Start: 2020-05-07

## 2020-05-07 NOTE — TELEPHONE ENCOUNTER
Patient requesting refills of Topiramate and Rizatriptan. Patient has not been seen since 2/18/2019. Needs follow up for refills.

## 2020-05-08 ENCOUNTER — POSTPARTUM (OUTPATIENT)
Dept: OBGYN CLINIC | Facility: CLINIC | Age: 25
End: 2020-05-08
Payer: COMMERCIAL

## 2020-05-08 ENCOUNTER — MED REC SCAN ONLY (OUTPATIENT)
Dept: OBGYN CLINIC | Facility: CLINIC | Age: 25
End: 2020-05-08

## 2020-05-08 ENCOUNTER — TELEPHONE (OUTPATIENT)
Dept: OBGYN CLINIC | Facility: CLINIC | Age: 25
End: 2020-05-08

## 2020-05-08 VITALS
HEIGHT: 69 IN | DIASTOLIC BLOOD PRESSURE: 76 MMHG | HEART RATE: 100 BPM | BODY MASS INDEX: 43.4 KG/M2 | WEIGHT: 293 LBS | SYSTOLIC BLOOD PRESSURE: 124 MMHG | TEMPERATURE: 98 F

## 2020-05-08 PROBLEM — O99.210 OBESITY AFFECTING PREGNANCY, ANTEPARTUM: Status: RESOLVED | Noted: 2019-09-30 | Resolved: 2020-05-08

## 2020-05-08 PROBLEM — O99.210 OBESITY AFFECTING PREGNANCY, ANTEPARTUM (HCC): Status: RESOLVED | Noted: 2019-09-30 | Resolved: 2020-05-08

## 2020-05-08 RX ORDER — NORETHINDRONE ACETATE AND ETHINYL ESTRADIOL 1; .02 MG/1; MG/1
1 TABLET ORAL DAILY
Qty: 3 PACKAGE | Refills: 3 | Status: SHIPPED | OUTPATIENT
Start: 2020-05-08 | End: 2021-07-12

## 2020-05-08 NOTE — TELEPHONE ENCOUNTER
Patient had short return to work form that was completed at appointment time. Given back to patient. Copy sent to scanning.

## 2020-05-08 NOTE — PROGRESS NOTES
Post Partum  No C/O  Bleeding resolved  Headache resolved  BP doing well  Wants OC  Serina doing well    ROS: No Cardiac, Respiratory, GI,  or Neurological symptoms.     PE:  Abdomen soft  Pelvic:External vag normal, cervix without lesions, uterus anterio

## 2020-05-12 ENCOUNTER — VIRTUAL PHONE E/M (OUTPATIENT)
Dept: NEUROLOGY | Facility: CLINIC | Age: 25
End: 2020-05-12
Payer: COMMERCIAL

## 2020-05-12 DIAGNOSIS — G43.019 MIGRAINE WITHOUT AURA, INTRACTABLE, WITHOUT STATUS MIGRAINOSUS: Primary | ICD-10-CM

## 2020-05-12 DIAGNOSIS — J98.01 COLD INDUCED BRONCHOSPASM: Primary | ICD-10-CM

## 2020-05-12 PROCEDURE — 99214 OFFICE O/P EST MOD 30 MIN: CPT | Performed by: OTHER

## 2020-05-12 RX ORDER — RIZATRIPTAN BENZOATE 10 MG/1
TABLET ORAL
Qty: 12 TABLET | Refills: 5 | Status: SHIPPED | OUTPATIENT
Start: 2020-05-12 | End: 2021-11-08

## 2020-05-12 RX ORDER — TOPIRAMATE 25 MG/1
TABLET ORAL
Qty: 120 TABLET | Refills: 2 | Status: SHIPPED | OUTPATIENT
Start: 2020-05-12 | End: 2020-08-04

## 2020-05-12 NOTE — PATIENT INSTRUCTIONS
For migraine prevention, Please start taking Topiramate (Topamax) as follows:  start at 25 mg nightly x1 week, then increase to 50 mg nightly x1 week, then 25 mg AM / 50 mg PM x1 week, then 50 mg bid; monitor for potential side effects, including but not l

## 2020-05-12 NOTE — PROGRESS NOTES
Virtual Telephone Visit    Nida Justice verbally consents to a Virtual/Telephone  visit on 05/12/20. Patient understands and accepts financial responsibility for any deductible, co-insurance and/or co-pays associated with this service.     Duration o and/or headaches, but sometimes has dizziness.   She denies associated double vision, focal numbness/tingling/weakness, loss of awareness, or visual field change.        Of note, her primary care provider recently prescribed her topiramate (Topamax), but pa Psychiatric Mother    • Thyroid Disorder Mother    • Diabetes Maternal Grandmother    • Breast Cancer Maternal Grandmother    • Cancer Maternal Grandmother         uterine and pancreatic   • Neurological Disorder Maternal Grandmother         ALS   • Psychi lungs 2 (two) times daily. , Disp: 1 Inhaler, Rfl: 0  •  Spacer/Aero-Holding Chambers Does not apply Device, Utilize spacer for inhalers, Disp: 1 Device, Rfl: 1  •  valACYclovir HCl 500 MG Oral Tab, Take 1 tablet (500 mg total) by mouth 2 (two) times daily. she become pregnant.      In addition, for abortive therapy, will resume rizatriptan (Maxalt), 10 mg -  Advised to take within first 30 minutes of symptoms starting; if not improved after 2 hours, take additional dose, and then stop taking; monitor for ches

## 2020-05-14 RX ORDER — BUDESONIDE 180 UG/1
AEROSOL, POWDER RESPIRATORY (INHALATION)
Qty: 1 EACH | Refills: 0 | Status: SHIPPED | OUTPATIENT
Start: 2020-05-14 | End: 2020-07-10

## 2020-05-14 NOTE — TELEPHONE ENCOUNTER
Spoke to pt. Pt states her cough & mucus got better, especially when staying inside. States now that she's out & about, cough & mucus is back. States is still needing to take pulmicort inhaler. Denies any chest pain.   States sob only when gets into cou

## 2020-05-14 NOTE — TELEPHONE ENCOUNTER
Last OV relevant to medication: 3/17/2020 Acute  Last refill date: 3/17/2020  #1/refills:0  When pt was asked to return for OV: 4 weeks for f/u cough  Upcoming appt/reason: 9/14/2020

## 2020-05-15 ENCOUNTER — VIRTUAL PHONE E/M (OUTPATIENT)
Dept: INTERNAL MEDICINE CLINIC | Facility: CLINIC | Age: 25
End: 2020-05-15
Payer: COMMERCIAL

## 2020-05-15 DIAGNOSIS — M79.671 FOOT PAIN, BILATERAL: ICD-10-CM

## 2020-05-15 DIAGNOSIS — J30.9 ALLERGIC RHINITIS, UNSPECIFIED SEASONALITY, UNSPECIFIED TRIGGER: ICD-10-CM

## 2020-05-15 DIAGNOSIS — R05.9 COUGH: Primary | ICD-10-CM

## 2020-05-15 DIAGNOSIS — M79.672 FOOT PAIN, BILATERAL: ICD-10-CM

## 2020-05-15 PROCEDURE — 99214 OFFICE O/P EST MOD 30 MIN: CPT | Performed by: INTERNAL MEDICINE

## 2020-05-15 NOTE — PROGRESS NOTES
Virtual Telephone Check-In    See psr/triage staff note for consent for virtual check in. Patient understands and accepts financial responsibility for any deductible, co-insurance and/or co-pays associated with this service.     Duration of the service: Oral Tab Take 10 mg by mouth daily. • Spacer/Aero-Holding Yuridia People Does not apply Device Utilize spacer for inhalers 1 Device 1   • valACYclovir HCl 500 MG Oral Tab Take 1 tablet (500 mg total) by mouth 2 (two) times daily.  60 tablet 1       PAST MEDIC - 7.70 x10 (3) uL    Neutrophil Absolute 9.31 (H) 1.50 - 7.70 x10(3) uL    Lymphocyte Absolute 3.10 1.00 - 4.00 x10(3) uL    Monocyte Absolute 0.64 0.10 - 1.00 x10(3) uL    Eosinophil Absolute 0.15 0.00 - 0.70 x10(3) uL    Basophil Absolute 0.03 0.00 - 0.2 6/15/2020) for follow up. Office visit. Oneal Claude Dellis Dexter, MD

## 2020-05-28 ENCOUNTER — TELEPHONE (OUTPATIENT)
Dept: INTERNAL MEDICINE CLINIC | Facility: CLINIC | Age: 25
End: 2020-05-28

## 2020-05-28 NOTE — TELEPHONE ENCOUNTER
Patient cancelled her follow up visit scheduled for June 19th due to the change in office hours. She has not completed the tests Dr Joyce Corea ordered due to appointments for her children. Does Dr Joyce Corea want to see her before or after she has the tests?   Please

## 2020-05-29 NOTE — TELEPHONE ENCOUNTER
Spoke to pt. Noted 6/19/2020 OV is suppose to be follow-up from 5/15/2020 TV. Per note, pt is to complete    - COMPLETE PFT  - METHACHOLINE CHALLENGE. Advised Dr. Zaki Parks would want to see pt after the tests.   Pt then states \"I didn't cancel the appoint

## 2020-06-01 ENCOUNTER — TELEPHONE (OUTPATIENT)
Dept: INTERNAL MEDICINE CLINIC | Facility: CLINIC | Age: 25
End: 2020-06-01

## 2020-06-01 NOTE — TELEPHONE ENCOUNTER
Spoke to pt. Advised okay to postpone both tests until August.  Advised to continue pulmicort. Pt voiced understanding.

## 2020-06-01 NOTE — TELEPHONE ENCOUNTER
Paulino Re called and said that they have closed all Methocholine challenge tests until Aug. Paulino Re needs to know the reason for the Methocholie test and if the Complete PFT test was ok to do.    Please advise  Thank you

## 2020-06-01 NOTE — TELEPHONE ENCOUNTER
Spoke with Suad Pfeiffer at [facility]. They are not able to do any methacholine tests until late August due to EXDLH-57/[id number] policy. With pt having possible reactive airway, would need both PFT and methacholine.  However, they can only do either PFT now and

## 2020-06-26 ENCOUNTER — OFFICE VISIT (OUTPATIENT)
Dept: PODIATRY CLINIC | Facility: CLINIC | Age: 25
End: 2020-06-26
Payer: COMMERCIAL

## 2020-06-26 VITALS — TEMPERATURE: 98 F

## 2020-06-26 DIAGNOSIS — M72.2 PLANTAR FASCIITIS OF LEFT FOOT: ICD-10-CM

## 2020-06-26 DIAGNOSIS — M79.672 INFLAMMATORY HEEL PAIN, LEFT: ICD-10-CM

## 2020-06-26 DIAGNOSIS — M79.671 INFLAMMATORY HEEL PAIN, RIGHT: ICD-10-CM

## 2020-06-26 DIAGNOSIS — M72.2 PLANTAR FASCIITIS OF RIGHT FOOT: ICD-10-CM

## 2020-06-26 DIAGNOSIS — M79.671 RIGHT FOOT PAIN: ICD-10-CM

## 2020-06-26 DIAGNOSIS — M79.672 LEFT FOOT PAIN: Primary | ICD-10-CM

## 2020-06-26 PROCEDURE — 99203 OFFICE O/P NEW LOW 30 MIN: CPT | Performed by: PODIATRIST

## 2020-06-26 RX ORDER — METHYLPREDNISOLONE 4 MG/1
TABLET ORAL
Qty: 1 PACKAGE | Refills: 0 | Status: SHIPPED | OUTPATIENT
Start: 2020-06-26 | End: 2020-08-04

## 2020-06-26 NOTE — TELEPHONE ENCOUNTER
LMOM - OV cancelled for 6/29/20 since tests could not be completed. Dr Birdia Collet suggested patient call to see if she can have resp tests before August.  Please schedule appt after testing completed.

## 2020-06-29 ENCOUNTER — OFFICE VISIT (OUTPATIENT)
Dept: INTERNAL MEDICINE CLINIC | Facility: CLINIC | Age: 25
End: 2020-06-29
Payer: COMMERCIAL

## 2020-06-29 VITALS
WEIGHT: 293 LBS | HEIGHT: 69 IN | BODY MASS INDEX: 43.4 KG/M2 | SYSTOLIC BLOOD PRESSURE: 130 MMHG | DIASTOLIC BLOOD PRESSURE: 76 MMHG | RESPIRATION RATE: 12 BRPM | HEART RATE: 72 BPM | TEMPERATURE: 98 F

## 2020-06-29 DIAGNOSIS — M25.521 RIGHT ELBOW PAIN: Primary | ICD-10-CM

## 2020-06-29 DIAGNOSIS — R05.9 COUGH: ICD-10-CM

## 2020-06-29 PROCEDURE — 99213 OFFICE O/P EST LOW 20 MIN: CPT | Performed by: INTERNAL MEDICINE

## 2020-06-29 RX ORDER — ALBUTEROL SULFATE 90 UG/1
2 AEROSOL, METERED RESPIRATORY (INHALATION) EVERY 6 HOURS PRN
COMMUNITY
End: 2021-12-07

## 2020-06-29 NOTE — PROGRESS NOTES
[facility]    CHIEF COMPLAINT:  Patient presents with:  Elbow Pain: Right elbow pain x 2 weeks. No injury. HISTORY OF PRESENT ILLNESS:  Complains of pain in right elbow for 2 weeks. No trauma or injury that she remembers.  No redness or sw Smoker 0.50 Packs/day For 3.00 Years       PHYSICAL EXAM:  /76 (BP Location: Left arm, Patient Position: Sitting, Cuff Size: large)   Pulse 72   Temp 97.5 °F (36.4 °C) (Temporal)   Resp 12   Ht 69\"   Wt (!) 314 lb 8 oz (142.7 kg)   LMP 06/27/2020 (E RDW 13.9 11.0 - 15.0 %    RDW-SD 47.1 (H) 35.1 - 46.3 fL    Neutrophil Absolute Prelim 9.31 (H) 1.50 - 7.70 x10 (3) uL    Neutrophil Absolute 9.31 (H) 1.50 - 7.70 x10(3) uL    Lymphocyte Absolute 3.10 1.00 - 4.00 x10(3) uL    Monocyte Absolute 0.64 0.10

## 2020-06-29 NOTE — PROGRESS NOTES
Janay Lakhani is a 30year old female. Patient presents with:   Foot Pain: vik foot pain since February, pain at a 2/10 today        HPI:   This pleasant patient presents to the clinic with a chief complaint of bilateral foot pain which she has had since • Pap smear for cervical cancer screening 10-27-14    wnl   • Pregnancy-induced hypertension     Hx PIH w/ 1st two pregnancies, IOL @ 39.4 and 36.6 wks   • S/P tonsillectomy       Past Surgical History:   Procedure Laterality Date   • TONSILLECTOMY Days)   GENERAL: well developed, well nourished, in no apparent distress  EXTREMITIES:   1. Integument: The skin on both feet was examined is warm moist supple. Nails are relatively normal thickness and appearance. 2. Vascular:  The patient has palpable Comfort Howard, JOE

## 2020-07-08 ENCOUNTER — TELEPHONE (OUTPATIENT)
Dept: NEUROLOGY | Facility: CLINIC | Age: 25
End: 2020-07-08

## 2020-07-08 NOTE — TELEPHONE ENCOUNTER
Called and spoke to patient - states migraines / headaches returning on dose of 25 mg nightly and did not notice tingling until up to 50 mg bid - will try 25 mg bid and monitor for changes.

## 2020-07-08 NOTE — TELEPHONE ENCOUNTER
Pt stated she is having side effects with topiramate. Pt stated she is having tingling and it is waking her up in the middle of the night.      Call pt

## 2020-07-08 NOTE — TELEPHONE ENCOUNTER
S:  Pt started experiencing intolerable tingling in her hands and feet, which has been waking her up at night, since starting back on Topamax. B: Pt was restarted on Topamax at OV on 5/12/20, and ramped up over 4 weeks to ultimate dose of 50mg BID.

## 2020-07-10 DIAGNOSIS — J98.01 COLD INDUCED BRONCHOSPASM: ICD-10-CM

## 2020-07-10 RX ORDER — BUDESONIDE 180 UG/1
AEROSOL, POWDER RESPIRATORY (INHALATION)
Qty: 1 EACH | Refills: 0 | Status: SHIPPED | OUTPATIENT
Start: 2020-07-10 | End: 2020-09-16

## 2020-08-04 ENCOUNTER — ORDER TRANSCRIPTION (OUTPATIENT)
Dept: ADMINISTRATIVE | Facility: HOSPITAL | Age: 25
End: 2020-08-04

## 2020-08-04 ENCOUNTER — OFFICE VISIT (OUTPATIENT)
Dept: INTERNAL MEDICINE CLINIC | Facility: CLINIC | Age: 25
End: 2020-08-04
Payer: COMMERCIAL

## 2020-08-04 VITALS
WEIGHT: 293 LBS | DIASTOLIC BLOOD PRESSURE: 76 MMHG | RESPIRATION RATE: 12 BRPM | BODY MASS INDEX: 43.4 KG/M2 | SYSTOLIC BLOOD PRESSURE: 120 MMHG | HEIGHT: 69 IN | TEMPERATURE: 97 F | HEART RATE: 64 BPM

## 2020-08-04 DIAGNOSIS — Z11.59 ENCOUNTER FOR SCREENING FOR OTHER VIRAL DISEASES: ICD-10-CM

## 2020-08-04 DIAGNOSIS — Z01.818 OTHER SPECIFIED PRE-OPERATIVE EXAMINATION: Primary | ICD-10-CM

## 2020-08-04 DIAGNOSIS — Z00.00 PHYSICAL EXAM, ANNUAL: Primary | ICD-10-CM

## 2020-08-04 PROCEDURE — 3078F DIAST BP <80 MM HG: CPT | Performed by: INTERNAL MEDICINE

## 2020-08-04 PROCEDURE — 3008F BODY MASS INDEX DOCD: CPT | Performed by: INTERNAL MEDICINE

## 2020-08-04 PROCEDURE — 3074F SYST BP LT 130 MM HG: CPT | Performed by: INTERNAL MEDICINE

## 2020-08-04 PROCEDURE — 99395 PREV VISIT EST AGE 18-39: CPT | Performed by: INTERNAL MEDICINE

## 2020-08-04 NOTE — PROGRESS NOTES
[facility]    CHIEF COMPLAINT: Patient presents with:  Routine Physical: sees gyne. 9/30/19-pap.          HPI:   Tucker Cazares is a 30year old female who presents for a complete physical exam. Symptoms: denies discharge, itching, burning or dy Acyclovir; last outbreak 3 weeks ago; no active lesions, plan for vaginal delivery   • Miscarriage 2012   • Pap smear for cervical cancer screening 10-27-14    wnl   • Pregnancy-induced hypertension     Hx PIH w/ 1st two pregnancies, IOL @ 39.4 and 36.6 wk 97.2 °F (36.2 °C) (Temporal)   Resp 12   Ht 69\"   Wt (!) 321 lb 4.8 oz (145.7 kg)   LMP 07/27/2020 (Exact Date)   Breastfeeding No   BMI 47.45 kg/m²   Body mass index is 47.45 kg/m².    GENERAL: well developed, well nourished,in no apparent distress  SKIN: exam.   1. Physical exam, annual  Labs ordered. See gyne for pelvic and breast exam.   Urged regular exercise. tdap up to date. - CBC WITH DIFFERENTIAL WITH PLATELET; Future  - COMP METABOLIC PANEL (14); Future  - LIPID PANEL;  Future  - TSH W REFLEX

## 2020-09-01 ENCOUNTER — LAB ENCOUNTER (OUTPATIENT)
Dept: LAB | Age: 25
End: 2020-09-01
Attending: INTERNAL MEDICINE
Payer: COMMERCIAL

## 2020-09-01 ENCOUNTER — APPOINTMENT (OUTPATIENT)
Dept: LAB | Age: 25
End: 2020-09-01
Attending: INTERNAL MEDICINE
Payer: COMMERCIAL

## 2020-09-01 DIAGNOSIS — Z00.00 PHYSICAL EXAM, ANNUAL: ICD-10-CM

## 2020-09-01 DIAGNOSIS — Z11.59 ENCOUNTER FOR SCREENING FOR OTHER VIRAL DISEASES: ICD-10-CM

## 2020-09-01 DIAGNOSIS — Z01.818 OTHER SPECIFIED PRE-OPERATIVE EXAMINATION: ICD-10-CM

## 2020-09-01 LAB
ALBUMIN SERPL-MCNC: 3.1 G/DL (ref 3.4–5)
ALBUMIN/GLOB SERPL: 0.8 {RATIO} (ref 1–2)
ALP LIVER SERPL-CCNC: 81 U/L (ref 37–98)
ALT SERPL-CCNC: 18 U/L (ref 13–56)
ANION GAP SERPL CALC-SCNC: 6 MMOL/L (ref 0–18)
AST SERPL-CCNC: 13 U/L (ref 15–37)
BASOPHILS # BLD AUTO: 0.03 X10(3) UL (ref 0–0.2)
BASOPHILS NFR BLD AUTO: 0.4 %
BILIRUB SERPL-MCNC: 0.4 MG/DL (ref 0.1–2)
BUN BLD-MCNC: 10 MG/DL (ref 7–18)
BUN/CREAT SERPL: 14.3 (ref 10–20)
CALCIUM BLD-MCNC: 9.1 MG/DL (ref 8.5–10.1)
CHLORIDE SERPL-SCNC: 107 MMOL/L (ref 98–112)
CHOLEST SMN-MCNC: 176 MG/DL (ref ?–200)
CO2 SERPL-SCNC: 24 MMOL/L (ref 21–32)
CREAT BLD-MCNC: 0.7 MG/DL (ref 0.55–1.02)
DEPRECATED RDW RBC AUTO: 45.1 FL (ref 35.1–46.3)
EOSINOPHIL # BLD AUTO: 0.14 X10(3) UL (ref 0–0.7)
EOSINOPHIL NFR BLD AUTO: 1.7 %
ERYTHROCYTE [DISTWIDTH] IN BLOOD BY AUTOMATED COUNT: 13.2 % (ref 11–15)
GLOBULIN PLAS-MCNC: 3.8 G/DL (ref 2.8–4.4)
GLUCOSE BLD-MCNC: 106 MG/DL (ref 70–99)
HCT VFR BLD AUTO: 39.3 % (ref 35–48)
HDLC SERPL-MCNC: 50 MG/DL (ref 40–59)
HGB BLD-MCNC: 12.3 G/DL (ref 12–16)
IMM GRANULOCYTES # BLD AUTO: 0.03 X10(3) UL (ref 0–1)
IMM GRANULOCYTES NFR BLD: 0.4 %
LDLC SERPL CALC-MCNC: 90 MG/DL (ref ?–100)
LYMPHOCYTES # BLD AUTO: 2.35 X10(3) UL (ref 1–4)
LYMPHOCYTES NFR BLD AUTO: 28 %
M PROTEIN MFR SERPL ELPH: 6.9 G/DL (ref 6.4–8.2)
MCH RBC QN AUTO: 29.1 PG (ref 26–34)
MCHC RBC AUTO-ENTMCNC: 31.3 G/DL (ref 31–37)
MCV RBC AUTO: 93.1 FL (ref 80–100)
MONOCYTES # BLD AUTO: 0.42 X10(3) UL (ref 0.1–1)
MONOCYTES NFR BLD AUTO: 5 %
NEUTROPHILS # BLD AUTO: 5.42 X10 (3) UL (ref 1.5–7.7)
NEUTROPHILS # BLD AUTO: 5.42 X10(3) UL (ref 1.5–7.7)
NEUTROPHILS NFR BLD AUTO: 64.5 %
NONHDLC SERPL-MCNC: 126 MG/DL (ref ?–130)
OSMOLALITY SERPL CALC.SUM OF ELEC: 283 MOSM/KG (ref 275–295)
PATIENT FASTING Y/N/NP: YES
PATIENT FASTING Y/N/NP: YES
PLATELET # BLD AUTO: 265 10(3)UL (ref 150–450)
POTASSIUM SERPL-SCNC: 3.7 MMOL/L (ref 3.5–5.1)
RBC # BLD AUTO: 4.22 X10(6)UL (ref 3.8–5.3)
SODIUM SERPL-SCNC: 137 MMOL/L (ref 136–145)
TRIGL SERPL-MCNC: 180 MG/DL (ref 30–149)
TSI SER-ACNC: 1.03 MIU/ML (ref 0.36–3.74)
VLDLC SERPL CALC-MCNC: 36 MG/DL (ref 0–30)
WBC # BLD AUTO: 8.4 X10(3) UL (ref 4–11)

## 2020-09-01 PROCEDURE — 80050 GENERAL HEALTH PANEL: CPT | Performed by: INTERNAL MEDICINE

## 2020-09-01 PROCEDURE — 36415 COLL VENOUS BLD VENIPUNCTURE: CPT | Performed by: INTERNAL MEDICINE

## 2020-09-01 PROCEDURE — 80061 LIPID PANEL: CPT | Performed by: INTERNAL MEDICINE

## 2020-09-03 LAB — SARS-COV-2 RNA RESP QL NAA+PROBE: NOT DETECTED

## 2020-09-04 ENCOUNTER — RT VISIT (OUTPATIENT)
Dept: RESPIRATORY THERAPY | Facility: HOSPITAL | Age: 25
End: 2020-09-04
Attending: INTERNAL MEDICINE
Payer: COMMERCIAL

## 2020-09-04 DIAGNOSIS — R05.9 COUGH: ICD-10-CM

## 2020-09-04 PROCEDURE — 94726 PLETHYSMOGRAPHY LUNG VOLUMES: CPT

## 2020-09-04 PROCEDURE — 94729 DIFFUSING CAPACITY: CPT

## 2020-09-04 PROCEDURE — 94070 EVALUATION OF WHEEZING: CPT

## 2020-09-04 NOTE — PROCEDURES
Evander Harada is a 59-year-old  female who stands 5 feet 9 inches tall and weighs 321 pounds. She underwent complete pulmonary function testing on 9/4/2020. She carries a diagnosis of cough.   She reports a 1.5-pack-year smoking history but stop

## 2020-09-15 DIAGNOSIS — J98.01 COLD INDUCED BRONCHOSPASM: ICD-10-CM

## 2020-09-16 RX ORDER — BUDESONIDE 180 UG/1
AEROSOL, POWDER RESPIRATORY (INHALATION)
Qty: 1 EACH | Refills: 0 | OUTPATIENT
Start: 2020-09-16

## 2020-09-16 RX ORDER — BUDESONIDE 180 UG/1
AEROSOL, POWDER RESPIRATORY (INHALATION)
Qty: 1 EACH | Refills: 0 | Status: SHIPPED | OUTPATIENT
Start: 2020-09-16 | End: 2020-11-20

## 2020-09-16 NOTE — TELEPHONE ENCOUNTER
Refill done for pulmicort 1 inhaler. Her pft showed no asthma. Okay to try to wean off the pulmicort now. Do 1 puff daily for 3 days then 1 puff every other day for 3 days then stop. Call office if her cough returns. Please inform pt.

## 2020-09-16 NOTE — TELEPHONE ENCOUNTER
Last OV relevant to medication: 8/4/2020 PE  Last refill date:7/10/2020   #1 /refills:0  When pt was asked to return for OV:6 months  Upcoming appt/reason:No appt scheduled. No ACT/AAP.  Pt did complete PFT on 9/4/2020

## 2020-11-20 ENCOUNTER — OFFICE VISIT (OUTPATIENT)
Dept: OBGYN CLINIC | Facility: CLINIC | Age: 25
End: 2020-11-20
Payer: COMMERCIAL

## 2020-11-20 VITALS
HEIGHT: 69 IN | DIASTOLIC BLOOD PRESSURE: 76 MMHG | HEART RATE: 93 BPM | WEIGHT: 293 LBS | SYSTOLIC BLOOD PRESSURE: 122 MMHG | BODY MASS INDEX: 43.4 KG/M2

## 2020-11-20 DIAGNOSIS — Z01.419 WELL FEMALE EXAM WITH ROUTINE GYNECOLOGICAL EXAM: Primary | ICD-10-CM

## 2020-11-20 PROCEDURE — 3008F BODY MASS INDEX DOCD: CPT | Performed by: OBSTETRICS & GYNECOLOGY

## 2020-11-20 PROCEDURE — 99072 ADDL SUPL MATRL&STAF TM PHE: CPT | Performed by: OBSTETRICS & GYNECOLOGY

## 2020-11-20 PROCEDURE — 99395 PREV VISIT EST AGE 18-39: CPT | Performed by: OBSTETRICS & GYNECOLOGY

## 2020-11-20 PROCEDURE — 3074F SYST BP LT 130 MM HG: CPT | Performed by: OBSTETRICS & GYNECOLOGY

## 2020-11-20 PROCEDURE — 3078F DIAST BP <80 MM HG: CPT | Performed by: OBSTETRICS & GYNECOLOGY

## 2020-11-20 RX ORDER — LEVONORGESTREL / ETHINYL ESTRADIOL AND ETHINYL ESTRADIOL 150-30(84)
1 KIT ORAL DAILY
Qty: 1 PACKAGE | Refills: 3 | Status: SHIPPED | OUTPATIENT
Start: 2020-11-20 | End: 2021-02-19

## 2020-11-20 NOTE — PROGRESS NOTES
Annual  No C/O  Gets 4 days of being sick prior to menses. Also happened with other OC  BP normal   wants another pregnancy    ROS: No Cardiac, Respiratory, GI,  or Neurological symptoms.     PE:  GENERAL: well developed, well nourished, in no appa

## 2020-12-30 ENCOUNTER — TELEPHONE (OUTPATIENT)
Dept: OBGYN CLINIC | Facility: CLINIC | Age: 25
End: 2020-12-30

## 2020-12-30 NOTE — TELEPHONE ENCOUNTER
Patient states she is on 2nd pack of Seasonique. She is taking at the same time everyday and has not missed any days. She stated the spotting is so minimal she doesn't even have to use a pad or tampon.  On prior OCP she would feel sick prior to period and n

## 2021-02-19 NOTE — TELEPHONE ENCOUNTER
;      [facility]  [address], [place] [address]  [phone]     Clinical Summary     PERSON INFORMATION   Name JOSE ROPER Age  21 Years  1997 12:00 AM   Acct# [id number] Sex Female Phone [phone]   Dispo Type Home - (i.e. Home on oxygen, DME)-  Arrival 2018 11:21 PM Checkout 2018 2:22 AM    Address: [address] [place] [address]      Visit Reason ABD PAIN VOMITING     ED Physician Note     Patient:   JOSE ROPER            MRN: [id number]            FIN: [id number]               Age:   21 years     Sex:  Female     :  1997   Associated Diagnoses:   None   Author:   Seth LÓPEZ, Lorenzo ANDRADE      Basic Information   Additional information: Chief Complaint from Nursing Triage Note : Chief Complaint   2018 23:53           Chief Complaint           abd pain  .      History of Present Illness   The patient is a 21-year-old female here with right pelvic pain.  The patient says that she felt fine earlier today, she went to get pizza and had a glass of wine and then in the restaurant around 930pm she developed relatively sudden onset of right lower pelvic pain which she describes as a cramping pain moderate to severe nonradiating.  States of the left the restaurant she began to feel worse she had several episodes of vomiting she had a normal bowel movement.  States the pain is worse if she stands up and walks and better if she sits down or leans forward.  There is no fever no vaginal discharge she is currently on oral contraceptives and her last period was 3 weeks ago.  She has no previous history of ovarian cysts or kidney stones.  She says that prior to when this pain began she had absolutely no symptoms whatsoever. No other complaints.       Review of Systems   General: No fever.  Skin: No rash.  Eyes: No vision problems.  ENT: No sore throat.  Neck: No neck stiffness.  Respiratory: No shortness of  The patient called stating that she has had diarrhea since Monday (x 4 days) and has been going to the bathroom about every 10 minutes since Monday.  The patient states she does have some nausea, but also stated that it could be due to her period, so she's breath.  Cardiac: No chest pain.  Gastrointestinal: pelvic pain/vomiting  Urinary: No dysuria.  Musculoskeletal: No myalgias/arthralgias.  Neurologic: No headache.       Health Status   Allergies:    Allergic Reactions (All)  NKA.      Past Medical/ Family/ Social History   Medical History:   No history of ovarian cysts    Surgical History:  No prior abdominal surgeries    Social History:    Employed in a dental clinic  Tobacco: denies  Alcohol: occasional      Physical Examination               Vital Signs   Pulse Ox > 95% on Room Air which is normal for this patient.   Vital Signs (last 24 hrs)_____ Last Charted___________Minimum____________ Maximum____________  Temp    98.2  (AUG 07 23:25) 98.2  (AUG 07 23:25) 98.2  (AUG 07 23:25)  Heart Rate   99  (AUG 07 23:25) 99  (AUG 07 23:25) 99  (AUG 07 23:25)  Resp Rate       18  (AUG 07 23:25) 18  (AUG 07 23:25) 18  (AUG 07 23:25)  SBP    101  (AUG 07 23:25) 101  (AUG 07 23:25) 101  (AUG 07 23:25)  DBP    L 56 (AUG 07 23:25) L 56 (AUG 07 23:25) L 56 (AUG 07 23:25)    General Apperance: No acute distress  Skin: No rash  HEENT: Normocephalic/atraumatic, sclera anicteric, mucous membranes moist  Neck: Normal range of motion  Chest and Lungs: Bilateral breath sounds, clear to auscultation  Cardiovascular: Regular rate and rhythm, no murmur  Abdomen: Soft, non-distended, there is some tenderness to the right adnexal region, no RUQ tenderness  Back: Normal  Musculoskeletal: No edema or tenderness  Neurologic: Awake, alert, no obvious deficits, moving all extremities  Psychiatric: Appropriate, cooperative       Medical Decision Making   Differential Diagnosis:  Abdominal pain, Pelvic pain: Differential diagnoses to consider in this patient include: ovarian cyst rupture vs torsion vs pregnancy vs ectopic pregnancy vs cervicitis vs TOA vs UTI vs BV/trichomonas/STD vs acute gastroenteritis vs appendicitis vs diverticulitis/colitis vs obstruction vs renal colic. .     Rationale:  Multiple differential diagnoses were considered including but not limited to those listed above. .   Results review:  Lab results : Lab [facility]   8/8/2018 0:01            Urine Preg POC            Negative    8/7/2018 23:45           Glucose Lvl               87 mg/dL                             BUN                       13 mg/dL                             Creatinine                0.74 mg/dL                             eGFR AfrAmer              >60 mL/min/1.73m2  NA                             eGFR NonAfrAmer           >60 mL/min/1.73m2  NA                             Sodium                    141 mEq/L                             Potassium                 3.4 mEq/L  LOW                             Chloride                  107 mEq/L                             TCO2                      23 mEq/L                             AGAP                      14 mEq/L                             Calcium                   10.4 mg/dL  HI                             Alk Phos                  72 unit/L                             Bili Total                0.4 mg/dL                             Total Protein             8.3 g/dL                             Albumin                   4.7 g/dL                             Globulin_                 3.6 g/dL                             A/G Ratio_                1.3  NA                             AST/GOT                   18 unit/L                             ALT/GPT                   10 unit/L                             Lipase Level              31 unit/L                             WBC                       8.7 K/cumm                             RBC                       4.52 M/cumm                             Hgb                       13.5 g/dL                             Hct                       39 %                             MCV                       87 FL                             MCH                       30 pg                             MCHC                       34 g/dL                             RDW                       11.9 %                             Platelet                  213 K/cumm                             NRBC                      0.0 %                             Abs Neutro                5.7 K/cumm                             Neutrophil                66 %  NA                             Abs Lymph                 2.0 K/cumm                             Lymphocyte                22 %  NA                             Abs Mono                  0.4 K/cumm                             Monocyte                  5 %  NA                             Immature Gran             0.2 %                             Eosinophil                6 %                             Basophil                  1 %                             UA Appear                 Clear                             UA Color                  Yellow                             UA pH                     7.0                             UA Spec Grav              1.010                             UA Glucose                Negative                             UA Bili                   Negative                             UA Ketones                Negative                             UA Blood                  1+                             UA Protein                Negative                             UA Urobilinogen           0.2 mg/dL                             UA Nitrite                Negative                             UA Leuk Est               Negative                             UA Epithelial             Rare                             UA RBC                    None Seen                             UA WBC                    Rare                             UA Bacteria               Few  .      Reexamination/ Reevaluation   The patient was treated with zofran, toradol and IV fluids. UCG was negative. Pelvic u/s was obtained and shows some free fluid with no ovarian cyst, no other acute pathology.  When I re-evaluated her she reported feeling \"much better\" states her pain is completely gone.  History is not c/w appendicitis given sudden onset of pain, no preceeding anorexia fever etc. Given her hematuria I did talk to her about the possibility of a kidney stone and I initially ordered a CT scan to look into this but she refused a CT scan and says that she wants to go home and she said that she would follow-up with her own doctor tomorrow or come back to the ER if her pain returns or is getting any worse.  I recommended she take the day off from work today, I gave her a prescription for ibuprofen.  I also recommended heating pads to the area as I do feel that a ruptured ovarian cyst is the most likely etiology of her pain. I explained to the patient that an emergency department evaluation is not exhaustive and it is important to follow up with a primary care physician or specialist as discussed, and to return to the emergency department if symptoms are not improving or are worsening. The patient verbalized understanding of these follow up instructions and return precautions.        Impression and Plan   Diagnosis   1. pelvic pain   Plan   Condition: Improved.    Disposition: Discharged: to home.    Prescriptions: Launch prescriptions   Pharmacy:  ibuprofen 800 mg oral tablet (Prescribe): 800 mg, 1 tab(s), PO, TID, PRN: for pain, 30 tab(s), 0 Refill(s).    Patient was given the following educational materials: Pelvic Pain, Female, Easy-to-Read.    Follow up with: Follow up with primary care provider TAKE IBUPROFEN AS NEEDED FOR PAIN  USE A HEATING PAD ON THE AREA AS NEEDED  FOLLOW UP WITH YOUR DOCTOR IF YOU ARE NOT BETTER IN 2 DAYS  COME BACK TO THE ER IF YOU FEEL WORSE OR HAVE FEVER/VOMITING.        ED Time Seen By Provider Entered On:  8/7/2018 23:30     Performed On:  8/7/2018 23:30  by Lorenzo Ann MD               Time Seen By Provider   Time Seen by Provider :   8/7/2018 23:30    Lorenzo Ann MD -  8/7/2018 23:30           VITALS INFORMATION  Vitals/Ht/Wt  Temperature Oral:  98.2 F  Peripheral Pulse Rate:  99 bpm  Respiratory Rate:  18 br/min  Oxygen Saturation:  100 %  Systolic Blood Pressure:  101 mmHg  Diastolic Blood Pressure:  56 mmHg  Mean Arterial Pressure:  71 mmHg  Height:  162 cm  Weight:  55 kg       MEDICAL INFORMATION   Allergy Info:          NKA             Prescriptions:                  Prescription Display   ibuprofen (ibuprofen 800 mg oral tablet) 800 mg = 1 tab(s), PO, Tab, TID, PRN for pain, # 30 tab(s), 0 Refill(s)          DISCHARGE INFORMATION   Discharge Disposition: Home - (i.e. Home on oxygen, DME)- 01     PATIENT EDUCATION INFORMATION   Instructions:       Pelvic Pain, Female, Easy-to-Read   Follow up:                  With: Address: When:   Follow up with primary care provider     Comments:   TAKE IBUPROFEN AS NEEDED FOR PAIN   USE A HEATING PAD ON THE AREA AS NEEDED   FOLLOW UP WITH YOUR DOCTOR IF YOU ARE NOT BETTER IN 2 DAYS   COME BACK TO THE ER IF YOU FEEL WORSE OR HAVE FEVER/VOMITING            DIAGNOSIS

## 2021-05-19 ENCOUNTER — TELEPHONE (OUTPATIENT)
Dept: ORTHOPEDICS CLINIC | Facility: CLINIC | Age: 26
End: 2021-05-19

## 2021-05-19 NOTE — TELEPHONE ENCOUNTER
Received fax from [facility] requesting refill of Methylprednisolone 4 mg DOSPAK from Dr. Kamari Marcelo.    Pt LOV with [place] was on 06/26/20  Rx'd on 06/26/20    LMTCB on pts preferred #

## 2021-06-15 ENCOUNTER — OFFICE VISIT (OUTPATIENT)
Dept: PODIATRY CLINIC | Facility: CLINIC | Age: 26
End: 2021-06-15
Payer: COMMERCIAL

## 2021-06-15 DIAGNOSIS — M79.671 RIGHT FOOT PAIN: ICD-10-CM

## 2021-06-15 DIAGNOSIS — M77.41 METATARSALGIA OF BOTH FEET: ICD-10-CM

## 2021-06-15 DIAGNOSIS — M25.579 ARTHRALGIA OF FOOT, UNSPECIFIED LATERALITY: ICD-10-CM

## 2021-06-15 DIAGNOSIS — M19.072 ARTHROSIS OF MIDFOOT, LEFT: ICD-10-CM

## 2021-06-15 DIAGNOSIS — M79.672 LEFT FOOT PAIN: Primary | ICD-10-CM

## 2021-06-15 DIAGNOSIS — M77.42 METATARSALGIA OF BOTH FEET: ICD-10-CM

## 2021-06-15 PROCEDURE — 99213 OFFICE O/P EST LOW 20 MIN: CPT | Performed by: PODIATRIST

## 2021-06-15 RX ORDER — METHYLPREDNISOLONE 4 MG/1
TABLET ORAL
Qty: 21 TABLET | Refills: 0 | Status: SHIPPED | OUTPATIENT
Start: 2021-06-15 | End: 2021-07-12 | Stop reason: ALTCHOICE

## 2021-06-20 NOTE — PROGRESS NOTES
Blake Jorge is a 27year old female. Patient presents with: Foot Pain: bilateral, left worse- pt states pain went away after medrol pack in 06/2020 but pain came back 01/21.          HPI:   Pleasant patient presents to the clinic with a chief complain delivery   • Miscarriage 2012   • Pap smear for cervical cancer screening 10-27-14    wnl   • Pregnancy-induced hypertension     Hx PIH w/ 1st two pregnancies, IOL @ 39.4 and 36.6 wks   • S/P tonsillectomy       Past Surgical History:   Procedure Lateralit heartburn  NEURO: denies headaches    EXAM:   [facility] 11/02/2020 (Approximate)   GENERAL: well developed, well nourished, in no apparent distress  EXTREMITIES:   1. Integument: The skin on both feet is warm and dry.   On the left foot there is more erythema and

## 2021-07-12 ENCOUNTER — TELEMEDICINE (OUTPATIENT)
Dept: INTERNAL MEDICINE CLINIC | Facility: CLINIC | Age: 26
End: 2021-07-12

## 2021-07-12 VITALS — WEIGHT: 293 LBS | TEMPERATURE: 98 F | BODY MASS INDEX: 43.4 KG/M2 | HEIGHT: 69 IN

## 2021-07-12 DIAGNOSIS — J01.10 ACUTE FRONTAL SINUSITIS, RECURRENCE NOT SPECIFIED: Primary | ICD-10-CM

## 2021-07-12 PROCEDURE — 99213 OFFICE O/P EST LOW 20 MIN: CPT | Performed by: INTERNAL MEDICINE

## 2021-07-12 PROCEDURE — 3008F BODY MASS INDEX DOCD: CPT | Performed by: INTERNAL MEDICINE

## 2021-07-12 RX ORDER — AZITHROMYCIN 250 MG/1
TABLET, FILM COATED ORAL
Qty: 6 TABLET | Refills: 0 | Status: SHIPPED | OUTPATIENT
Start: 2021-07-12 | End: 2021-07-17

## 2021-07-12 NOTE — PROGRESS NOTES
Virtual Telephone Check-In    Lennox Pina verbally consents to a Virtual/Telephone Check-In visit on 07/12/21. Patient has been referred to the [facility] website at [email or web address] to review the yearly Consent to Treat document.     Patient unders daily as needed. • Spacer/Aero-Holding Bernarda Moreno Does not apply Device Utilize spacer for inhalers 1 Device 1   • valACYclovir HCl 500 MG Oral Tab Take 1 tablet (500 mg total) by mouth 2 (two) times daily.  60 tablet 1       PAST MEDICAL, SOCIAL, AND F HCT 39.3 35.0 - 48.0 %    .0 150.0 - 450.0 10(3)uL    MCV 93.1 80.0 - 100.0 fL    MCH 29.1 26.0 - 34.0 pg    MCHC 31.3 31.0 - 37.0 g/dL    RDW 13.2 11.0 - 15.0 %    RDW-SD 45.1 35.1 - 46.3 fL    Neutrophil Absolute Prelim 5.42 1.50 - 7.70 x10 (3) tests and decision making. Appropriate medical decision-making and tests are ordered as detailed in the plan of care above. Return if symptoms worsen or fail to improve.       Romero Lambert MD

## 2021-07-28 RX ORDER — METHYLPREDNISOLONE 4 MG/1
TABLET ORAL
Qty: 21 EACH | Refills: 0 | Status: SHIPPED | OUTPATIENT
Start: 2021-07-28 | End: 2021-08-03 | Stop reason: ALTCHOICE

## 2021-07-28 NOTE — TELEPHONE ENCOUNTER
See mychart message, patient was unable to keep appointment and her feet are \"killing\" her. Message was left to call back. Last seen 6/15. Appointment scheduled 8/3.   Please advise on refill request or if you prefer patient to wait until her follow up

## 2021-08-03 ENCOUNTER — OFFICE VISIT (OUTPATIENT)
Dept: PODIATRY CLINIC | Facility: CLINIC | Age: 26
End: 2021-08-03
Payer: COMMERCIAL

## 2021-08-03 VITALS
BODY MASS INDEX: 43.4 KG/M2 | HEIGHT: 69 IN | DIASTOLIC BLOOD PRESSURE: 84 MMHG | WEIGHT: 293 LBS | SYSTOLIC BLOOD PRESSURE: 118 MMHG

## 2021-08-03 DIAGNOSIS — G57.82 NEUROMA OF THIRD INTERSPACE OF LEFT FOOT: ICD-10-CM

## 2021-08-03 DIAGNOSIS — M79.672 LEFT FOOT PAIN: Primary | ICD-10-CM

## 2021-08-03 DIAGNOSIS — M77.41 METATARSALGIA OF BOTH FEET: ICD-10-CM

## 2021-08-03 DIAGNOSIS — M77.42 METATARSALGIA OF BOTH FEET: ICD-10-CM

## 2021-08-03 DIAGNOSIS — M19.072 ARTHROSIS OF MIDFOOT, LEFT: ICD-10-CM

## 2021-08-03 PROCEDURE — 3074F SYST BP LT 130 MM HG: CPT | Performed by: PODIATRIST

## 2021-08-03 PROCEDURE — 3008F BODY MASS INDEX DOCD: CPT | Performed by: PODIATRIST

## 2021-08-03 PROCEDURE — 99070 SPECIAL SUPPLIES PHYS/QHP: CPT | Performed by: PODIATRIST

## 2021-08-03 PROCEDURE — 3079F DIAST BP 80-89 MM HG: CPT | Performed by: PODIATRIST

## 2021-08-03 PROCEDURE — 64455 NJX AA&/STRD PLTR COM DG NRV: CPT | Performed by: PODIATRIST

## 2021-08-03 PROCEDURE — 20600 DRAIN/INJ JOINT/BURSA W/O US: CPT | Performed by: PODIATRIST

## 2021-08-03 RX ORDER — TRIAMCINOLONE ACETONIDE 40 MG/ML
20 INJECTION, SUSPENSION INTRA-ARTICULAR; INTRAMUSCULAR ONCE
Status: COMPLETED | OUTPATIENT
Start: 2021-08-03 | End: 2021-08-03

## 2021-08-03 RX ADMIN — TRIAMCINOLONE ACETONIDE 20 MG: 40 INJECTION, SUSPENSION INTRA-ARTICULAR; INTRAMUSCULAR at 16:51:00

## 2021-08-03 RX ADMIN — TRIAMCINOLONE ACETONIDE 20 MG: 40 INJECTION, SUSPENSION INTRA-ARTICULAR; INTRAMUSCULAR at 16:52:00

## 2021-08-03 NOTE — PROGRESS NOTES
Per Dr. Mirta Alonzo to draw up 0.5ml Kenalog-40 and 0.5ml Marcaine 0.5% for right foot injection. x2  Patient had left office before obtaining post injection vitals.

## 2021-08-04 NOTE — PROGRESS NOTES
Blake Jorge is a 27year old female. Patient presents with:   Follow - Up: bilateral foot pain ,patient states her pain can reach an 8/10 ,medrol dose pk did not help last time         HPI:   Patient was seen at today's visit the Medrol Dosepaks have n History   Problem Relation Age of Onset   • Stroke Mother    • Cancer Mother         breast   • Psychiatric Mother    • Thyroid Disorder Mother    • Diabetes Maternal Grandmother    • Breast Cancer Maternal Grandmother    • Cancer Maternal Grandmother dorsal aspect of the foot left lower extremity in between the second and third toes. The Medrol Dosepak is not worked.     ASSESSMENT AND PLAN:   Diagnoses and all orders for this visit:    Left foot pain  -     triamcinolone acetonide (KENALOG-40) 40 MG/M

## 2021-08-31 ENCOUNTER — OFFICE VISIT (OUTPATIENT)
Dept: PODIATRY CLINIC | Facility: CLINIC | Age: 26
End: 2021-08-31
Payer: COMMERCIAL

## 2021-08-31 DIAGNOSIS — M79.672 LEFT FOOT PAIN: Primary | ICD-10-CM

## 2021-08-31 PROCEDURE — 99213 OFFICE O/P EST LOW 20 MIN: CPT | Performed by: PODIATRIST

## 2021-09-01 RX ORDER — METHYLPREDNISOLONE 4 MG/1
TABLET ORAL
Qty: 21 EACH | Refills: 0 | OUTPATIENT
Start: 2021-09-01

## 2021-09-02 NOTE — PROGRESS NOTES
Jocelyne Contreras is a 27year old female. Patient presents with: Foot Pain: bilateral -- left foot is feeling better and rates pain 2/10 in bilateral feet.          HPI:   Patient returns the clinic for checkup on both of the feet the left foot is feeling Surgical History:   Procedure Laterality Date   • TONSILLECTOMY        Family History   Problem Relation Age of Onset   • Stroke Mother    • Cancer Mother         breast   • Psychiatric Mother    • Thyroid Disorder Mother    • Diabetes Maternal Grandmother tattoos. 2. Vascular: The patient has palpable dorsalis pedis and posterior tibial pulses bilateral   3. Neurologic: Patient has intact sensorium.    4. Musculoskeletal: Patient has good muscle strength she has minimal discomfort on palpation of the midfo

## 2021-10-01 ENCOUNTER — TELEPHONE (OUTPATIENT)
Dept: OBGYN CLINIC | Facility: CLINIC | Age: 26
End: 2021-10-01

## 2021-10-01 NOTE — TELEPHONE ENCOUNTER
LMP: 2021    EDC based on lmp: 2022    8 wks on 10/31/2021        Are cycles regular?: 21-    Medical problems: None    Past sx hx: Tonsillectomy     Current medications: None    Past pregnancy complications: [address]    Current concerns

## 2021-10-01 NOTE — TELEPHONE ENCOUNTER
Patient calling to initiate prenatal care  LMP 09-  Patient is 7-8 weeks on 10-  Confirmation Ultrasound and Appointment scheduled on   Future Appointments   Date Time Provider Sara Woodard   11/8/2021 10:15 AM [facility] US [facility]

## 2021-11-06 PROBLEM — H40.003 GLAUCOMA SUSPECT OF BOTH EYES: Status: ACTIVE | Noted: 2021-03-10

## 2021-11-06 PROBLEM — A60.00 GENITAL HSV: Status: ACTIVE | Noted: 2021-11-06

## 2021-11-06 PROBLEM — O99.210 OBESITY IN PREGNANCY: Status: ACTIVE | Noted: 2021-11-06

## 2021-11-06 PROBLEM — O99.210 OBESITY IN PREGNANCY (HCC): Status: ACTIVE | Noted: 2021-11-06

## 2021-11-08 ENCOUNTER — OFFICE VISIT (OUTPATIENT)
Dept: OBGYN CLINIC | Facility: CLINIC | Age: 26
End: 2021-11-08
Payer: COMMERCIAL

## 2021-11-08 ENCOUNTER — ULTRASOUND ENCOUNTER (OUTPATIENT)
Dept: OBGYN CLINIC | Facility: CLINIC | Age: 26
End: 2021-11-08
Payer: COMMERCIAL

## 2021-11-08 VITALS — WEIGHT: 293 LBS | BODY MASS INDEX: 47 KG/M2 | SYSTOLIC BLOOD PRESSURE: 130 MMHG | DIASTOLIC BLOOD PRESSURE: 80 MMHG

## 2021-11-08 DIAGNOSIS — N91.2 AMENORRHEA: Primary | ICD-10-CM

## 2021-11-08 PROBLEM — O26.899 RH NEGATIVE STATUS DURING PREGNANCY: Status: ACTIVE | Noted: 2021-11-08

## 2021-11-08 PROBLEM — Z67.91 RH NEGATIVE STATUS DURING PREGNANCY: Status: ACTIVE | Noted: 2021-11-08

## 2021-11-08 PROBLEM — Z67.91 RH NEGATIVE STATUS DURING PREGNANCY (HCC): Status: ACTIVE | Noted: 2021-11-08

## 2021-11-08 PROBLEM — O26.899 RH NEGATIVE STATUS DURING PREGNANCY (HCC): Status: ACTIVE | Noted: 2021-11-08

## 2021-11-08 PROCEDURE — 99213 OFFICE O/P EST LOW 20 MIN: CPT | Performed by: OBSTETRICS & GYNECOLOGY

## 2021-11-08 PROCEDURE — 76856 US EXAM PELVIC COMPLETE: CPT | Performed by: OBSTETRICS & GYNECOLOGY

## 2021-11-08 PROCEDURE — 3079F DIAST BP 80-89 MM HG: CPT | Performed by: OBSTETRICS & GYNECOLOGY

## 2021-11-08 PROCEDURE — 3075F SYST BP GE 130 - 139MM HG: CPT | Performed by: OBSTETRICS & GYNECOLOGY

## 2021-11-08 NOTE — PROGRESS NOTES
Subjective:  Patient presents complaining of no menses. Positive home pregnancy test.  LMP 21 with 21 day cycle. Previous pregnancies uncomplicated vaginal deliveries except for gestational hypertension at 44 and 37 weeks.   No  labor or diabete

## 2021-11-08 NOTE — PATIENT INSTRUCTIONS
[facility]- Prenatal Testing    The following information is designed to help you understand some of the optional tests that are available to you to screen for problems in your pregnancy.   Before considering any of these tests, you will need blood test done any time after 10-11 weeks which screens for genetic abnormalities like Down Syndrome. It is greater than 98% sensitive, but requires an amniocentesis for confirmation if abnormal.  It can also tell you the sex of the baby.     Please call need to be passed from both parents to the child. A blood test is performed on the mother, and if her test is positive, then the father should also be tested.  These tests can be done at any time in the pregnancy, usually in the first trimester with your i amniocentesis, the other tests would be unnecessary. The above information covers some of the basics. Pamphlets on the Cell-Free DNA test, Quad Screen and Cystic Fibrosis test should be in your prenatal packet.   Your doctor will discuss this informat because they contain iron. If you find that the prenatal vitamin is making things worse, there are several things you can try. First, ask us for a different brand of vitamin.   Patients who have problems with one brand of vitamin often find that a differe

## 2021-11-12 ENCOUNTER — TELEMEDICINE (OUTPATIENT)
Dept: INTERNAL MEDICINE CLINIC | Facility: CLINIC | Age: 26
End: 2021-11-12

## 2021-11-12 ENCOUNTER — TELEPHONE (OUTPATIENT)
Dept: INTERNAL MEDICINE CLINIC | Facility: CLINIC | Age: 26
End: 2021-11-12

## 2021-11-12 VITALS — BODY MASS INDEX: 47 KG/M2 | HEIGHT: 69 IN

## 2021-11-12 DIAGNOSIS — J01.00 ACUTE NON-RECURRENT MAXILLARY SINUSITIS: Primary | ICD-10-CM

## 2021-11-12 PROCEDURE — 99213 OFFICE O/P EST LOW 20 MIN: CPT | Performed by: NURSE PRACTITIONER

## 2021-11-12 RX ORDER — AMOXICILLIN 500 MG/1
500 TABLET, FILM COATED ORAL 3 TIMES DAILY
Qty: 30 TABLET | Refills: 0 | Status: SHIPPED | OUTPATIENT
Start: 2021-11-12 | End: 2021-11-22

## 2021-11-12 NOTE — TELEPHONE ENCOUNTER
Spoke to pt. C/o severe sinus pain. Worse at night. States has tried Sudafed, humidifier, hot steamy showers. Has a lot of postnasal drip, runny nose, cough from PND. Denies SOB, chest pain, sore throat, loss of smell/taste.     States got COVID test a

## 2021-11-12 NOTE — PATIENT INSTRUCTIONS
Take antibiotic completely as ordered     Take antibiotic with food    Eat yogurt twice a day while on antibiotic or take an oral probiotic    Monitor for diarrhea, side effects, allergy    Follow up in one week as needed or when routine care is due

## 2021-11-12 NOTE — TELEPHONE ENCOUNTER
----- Message -----   From: Nicki Hagan   Sent: 11/12/2021   4:24 AM CST   To: [facility] Front Office   Subject: Appointment scheduled from [place] [id number]       Appointment For: Nicki Hagan ([id number])   Visit Type: [address] [facility] ([id number])

## 2021-11-12 NOTE — PROGRESS NOTES
Virtual video Check-In    Sergio Lin verbally consents to a Virtual/video Check-In visit on 11/12/21. Patient has been referred to the [facility] website at [email or web address] to review the yearly Consent to Treat document.     Patient understands an History:  Social History    Tobacco Use      Smoking status: Former Smoker        Packs/day: 0.50        Years: 3.00        Pack years: 1.5      Smokeless tobacco: Never Used      Tobacco comment: quit in 2014    Vaping Use      Vaping Use: Never used    A 6/5/21 by ultrasound Normal ovaries bilaterally. Marisa Lombard MD [facility]- Obstetrics & Gynecology       ASSESSMENT AND PLAN:   1. Acute non-recurrent maxillary sinusitis  - sinus pain and pressure since Monday with frontal HA.  Some PND with co

## 2021-12-07 ENCOUNTER — INITIAL PRENATAL (OUTPATIENT)
Dept: OBGYN CLINIC | Facility: CLINIC | Age: 26
End: 2021-12-07
Payer: COMMERCIAL

## 2021-12-07 VITALS
BODY MASS INDEX: 43.4 KG/M2 | SYSTOLIC BLOOD PRESSURE: 118 MMHG | DIASTOLIC BLOOD PRESSURE: 74 MMHG | HEIGHT: 69 IN | WEIGHT: 293 LBS

## 2021-12-07 DIAGNOSIS — O99.210 OBESITY IN PREGNANCY: ICD-10-CM

## 2021-12-07 DIAGNOSIS — Z36.9 ANTENATAL SCREENING ENCOUNTER: Primary | ICD-10-CM

## 2021-12-07 PROCEDURE — 81002 URINALYSIS NONAUTO W/O SCOPE: CPT | Performed by: OBSTETRICS & GYNECOLOGY

## 2021-12-07 PROCEDURE — 87086 URINE CULTURE/COLONY COUNT: CPT | Performed by: OBSTETRICS & GYNECOLOGY

## 2021-12-07 PROCEDURE — 3074F SYST BP LT 130 MM HG: CPT | Performed by: OBSTETRICS & GYNECOLOGY

## 2021-12-07 PROCEDURE — 3008F BODY MASS INDEX DOCD: CPT | Performed by: OBSTETRICS & GYNECOLOGY

## 2021-12-07 PROCEDURE — 3078F DIAST BP <80 MM HG: CPT | Performed by: OBSTETRICS & GYNECOLOGY

## 2021-12-07 NOTE — PROGRESS NOTES
Subjective:  Patient presents with:  Prenatal Care: NOB    27year old  female  [id number]  presents for new OB visit    Patient's last menstrual period was 09/05/2021 (exact date).  Estimated Date of Delivery: 6/5/22   Patient without complaints since finding cetirizine 10 MG Oral Tab, Take 10 mg by mouth daily as needed. , Disp: , Rfl:   valACYclovir HCl 500 MG Oral Tab, Take 1 tablet (500 mg total) by mouth 2 (two) times daily. , Disp: 60 tablet, Rfl: 1    No current facility-administered medications on kit Rh negative- Rhogam at 28 weeks. Obesity- check early glucola, Level 2  Hx of GHtn- recommend baby ASA until 36 weeks. Genital HSV- Valtrex at 36 weeks. Plan:  Pap smear 2019. Order for prenatal labs given.   Patient consents to HIV testing,

## 2021-12-07 NOTE — PATIENT INSTRUCTIONS
You have been referred to Maternal Fetal Medicine (High Risk OB/Perinatology) for an obstetrical consultation. Dr. Solomon Mora will need to call to make an appointment.     Susana Wong

## 2021-12-14 ENCOUNTER — LAB ENCOUNTER (OUTPATIENT)
Dept: LAB | Age: 26
End: 2021-12-14
Attending: OBSTETRICS & GYNECOLOGY
Payer: COMMERCIAL

## 2021-12-14 DIAGNOSIS — Z36.9 ANTENATAL SCREENING ENCOUNTER: ICD-10-CM

## 2021-12-14 PROCEDURE — 82950 GLUCOSE TEST: CPT | Performed by: OBSTETRICS & GYNECOLOGY

## 2021-12-14 PROCEDURE — 86762 RUBELLA ANTIBODY: CPT | Performed by: OBSTETRICS & GYNECOLOGY

## 2021-12-14 PROCEDURE — 86900 BLOOD TYPING SEROLOGIC ABO: CPT | Performed by: OBSTETRICS & GYNECOLOGY

## 2021-12-14 PROCEDURE — 87340 HEPATITIS B SURFACE AG IA: CPT | Performed by: OBSTETRICS & GYNECOLOGY

## 2021-12-14 PROCEDURE — 87389 HIV-1 AG W/HIV-1&-2 AB AG IA: CPT | Performed by: OBSTETRICS & GYNECOLOGY

## 2021-12-14 PROCEDURE — 85025 COMPLETE CBC W/AUTO DIFF WBC: CPT | Performed by: OBSTETRICS & GYNECOLOGY

## 2021-12-14 PROCEDURE — 86780 TREPONEMA PALLIDUM: CPT | Performed by: OBSTETRICS & GYNECOLOGY

## 2021-12-14 PROCEDURE — 86901 BLOOD TYPING SEROLOGIC RH(D): CPT | Performed by: OBSTETRICS & GYNECOLOGY

## 2021-12-14 PROCEDURE — 86850 RBC ANTIBODY SCREEN: CPT | Performed by: OBSTETRICS & GYNECOLOGY

## 2022-01-04 ENCOUNTER — ROUTINE PRENATAL (OUTPATIENT)
Dept: OBGYN CLINIC | Facility: CLINIC | Age: 27
End: 2022-01-04
Payer: COMMERCIAL

## 2022-01-04 ENCOUNTER — LAB ENCOUNTER (OUTPATIENT)
Dept: LAB | Age: 27
End: 2022-01-04
Attending: OBSTETRICS & GYNECOLOGY
Payer: COMMERCIAL

## 2022-01-04 VITALS — WEIGHT: 293 LBS | BODY MASS INDEX: 47 KG/M2 | SYSTOLIC BLOOD PRESSURE: 102 MMHG | DIASTOLIC BLOOD PRESSURE: 70 MMHG

## 2022-01-04 DIAGNOSIS — Z34.90 ENCOUNTER FOR SUPERVISION OF NORMAL PREGNANCY, ANTEPARTUM, UNSPECIFIED GRAVIDITY: ICD-10-CM

## 2022-01-04 DIAGNOSIS — O99.210 OBESITY IN PREGNANCY: ICD-10-CM

## 2022-01-04 DIAGNOSIS — Z3A.18 18 WEEKS GESTATION OF PREGNANCY: Primary | ICD-10-CM

## 2022-01-04 PROCEDURE — 3078F DIAST BP <80 MM HG: CPT | Performed by: OBSTETRICS & GYNECOLOGY

## 2022-01-04 PROCEDURE — 81002 URINALYSIS NONAUTO W/O SCOPE: CPT | Performed by: OBSTETRICS & GYNECOLOGY

## 2022-01-04 PROCEDURE — 81511 FTL CGEN ABNOR FOUR ANAL: CPT

## 2022-01-04 PROCEDURE — 3074F SYST BP LT 130 MM HG: CPT | Performed by: OBSTETRICS & GYNECOLOGY

## 2022-01-05 NOTE — PROGRESS NOTES
MINOO--18w3d    Doing well. Denies Vb, LOF, contractions. No fetal movement yet. A/P:   1. FHT-P  2. PNL: pt now is interested in genetics--discussed QUAD screen. Orders placed, advised to have drawn today  3. RH negative  4.  Obesity in pregnancy: had ea

## 2022-01-06 LAB
AFP SMOKING: NO
FAMILY HISTORY OF ANEUPLOIDY: NO
FAMILY HX NEURAL TUBE DEFECT: NO
INSULIN REQ MATERNAL DIABETES: NO
MATERNAL AGE OF DELIVERY: 29.3 YR
MOM FOR AFP: 1.09
MOM FOR DIA: 0.68
MOM FOR HCG: 0.63
MOM FOR UE3: 0.79
PATIENT'S AFP: 31 NG/ML
PATIENT'S DIA: 59 PG/ML
PATIENT'S HCG: NORMAL IU/L
PATIENT'S UE3: 1.06 NG/ML

## 2022-01-14 ENCOUNTER — TELEMEDICINE (OUTPATIENT)
Dept: INTERNAL MEDICINE CLINIC | Facility: CLINIC | Age: 27
End: 2022-01-14

## 2022-01-14 DIAGNOSIS — Z3A.20 20 WEEKS GESTATION OF PREGNANCY: ICD-10-CM

## 2022-01-14 DIAGNOSIS — J40 BRONCHITIS: ICD-10-CM

## 2022-01-14 DIAGNOSIS — U07.1 COVID-19 VIRUS INFECTION: ICD-10-CM

## 2022-01-14 DIAGNOSIS — J40 BRONCHITIS: Primary | ICD-10-CM

## 2022-01-14 PROCEDURE — 99214 OFFICE O/P EST MOD 30 MIN: CPT | Performed by: INTERNAL MEDICINE

## 2022-01-14 RX ORDER — ALBUTEROL SULFATE 90 UG/1
2 AEROSOL, METERED RESPIRATORY (INHALATION) EVERY 6 HOURS PRN
Qty: 1 EACH | Refills: 0 | Status: SHIPPED | OUTPATIENT
Start: 2022-01-14

## 2022-01-14 RX ORDER — ALBUTEROL SULFATE 90 UG/1
AEROSOL, METERED RESPIRATORY (INHALATION)
Qty: 25.5 G | Refills: 0 | OUTPATIENT
Start: 2022-01-14

## 2022-01-14 RX ORDER — CODEINE PHOSPHATE AND GUAIFENESIN 10; 100 MG/5ML; MG/5ML
5 SOLUTION ORAL 3 TIMES DAILY PRN
Qty: 236 ML | Refills: 0 | Status: SHIPPED | OUTPATIENT
Start: 2022-01-14 | End: 2022-01-24

## 2022-01-14 RX ORDER — PREDNISONE 20 MG/1
TABLET ORAL
Qty: 8 TABLET | Refills: 0 | Status: SHIPPED | OUTPATIENT
Start: 2022-01-14 | End: 2022-01-24 | Stop reason: ALTCHOICE

## 2022-01-14 NOTE — PROGRESS NOTES
Virtual Telephone Check-In    Melissa Garcia verbally consents to a Virtual/Telephone Check-In visit on 01/14/22. Patient has been referred to the [facility] website at [email or web address] to review the yearly Consent to Treat document.     Patient unders tablet (500 mg total) by mouth 2 (two) times daily.  60 tablet 1       PAST MEDICAL, SOCIAL, AND FAMILY HISTORY:  Tobacco:      Smoking status: Former Smoker 0.50 Packs/day For 3.00 Years       PHYSICAL EXAM:  LMP 09/05/2021 (Exact Date)    GENERAL: alert a cough.   She does not have a pulse oximeter but she can get one. Recommend she check this regularly. If less than 92% go to the ER. She verbalizes understanding. 3. 20 weeks gestation of pregnancy  follow up with gyne.          Pt understands phone/vi

## 2022-01-19 ENCOUNTER — ULTRASOUND ENCOUNTER (OUTPATIENT)
Dept: PERINATAL CARE | Facility: HOSPITAL | Age: 27
End: 2022-01-19
Attending: OBSTETRICS & GYNECOLOGY
Payer: COMMERCIAL

## 2022-01-19 VITALS
DIASTOLIC BLOOD PRESSURE: 79 MMHG | BODY MASS INDEX: 46 KG/M2 | SYSTOLIC BLOOD PRESSURE: 129 MMHG | WEIGHT: 293 LBS | HEART RATE: 78 BPM

## 2022-01-19 DIAGNOSIS — O99.211 OBESITY AFFECTING PREGNANCY IN FIRST TRIMESTER: ICD-10-CM

## 2022-01-19 DIAGNOSIS — Z03.75 SUSPECTED SHORTENING OF CERVIX NOT FOUND: ICD-10-CM

## 2022-01-19 DIAGNOSIS — O99.212 OBESITY AFFECTING PREGNANCY IN SECOND TRIMESTER: Primary | ICD-10-CM

## 2022-01-19 DIAGNOSIS — O99.210 MATERNAL MORBID OBESITY, ANTEPARTUM (HCC): ICD-10-CM

## 2022-01-19 DIAGNOSIS — Z87.59 HISTORY OF GESTATIONAL HYPERTENSION: ICD-10-CM

## 2022-01-19 DIAGNOSIS — O99.212 OBESITY AFFECTING PREGNANCY IN SECOND TRIMESTER: ICD-10-CM

## 2022-01-19 DIAGNOSIS — E66.01 MATERNAL MORBID OBESITY, ANTEPARTUM (HCC): ICD-10-CM

## 2022-01-19 PROCEDURE — 76817 TRANSVAGINAL US OBSTETRIC: CPT | Performed by: OBSTETRICS & GYNECOLOGY

## 2022-01-19 PROCEDURE — 76811 OB US DETAILED SNGL FETUS: CPT | Performed by: OBSTETRICS & GYNECOLOGY

## 2022-01-19 PROCEDURE — 76817 TRANSVAGINAL US OBSTETRIC: CPT

## 2022-01-19 PROCEDURE — 99242 OFF/OP CONSLTJ NEW/EST SF 20: CPT | Performed by: OBSTETRICS & GYNECOLOGY

## 2022-01-19 NOTE — PROGRESS NOTES
[place] Consultation    Dear Dr. Khalida Rodríguez    Thank you for requesting ultrasound evaluation and maternal fetal medicine consultation on your patient Es Romano.   As you are aware she is a 27year old female [id number] with a sin (PRENATAL AD OR) Take by mouth. • cetirizine 10 MG Oral Tab Take 10 mg by mouth daily as needed. • valACYclovir HCl 500 MG Oral Tab Take 1 tablet (500 mg total) by mouth 2 (two) times daily.  60 tablet 1        HISTORY:  Past Medical History:   Ashley Martinez presentation. Placenta is anterior. A 3 vessel cord is noted.   Cardiac activity is present at 155 bpm   g ( 0 lb 14 oz);   MVP is 5.1 cm    The cervix was not able to be clearly visualized and appeared short by transabdominal ultrasound, theref resistance. Due to its strong association with obesity in the general population, type 2 diabetes mellitus is one of the two most common medical complications of the obese .  The increased risk of type 2 diabetes is primarily related defect and cardiac), and the risk may increase with increasing maternal weight. Level II ultrasound is advised for women with obesity. The risk of neural tube defects increased significantly with maternal weight.     The analysis found that overweight and prompt and appropriate management will lead to an improvement in maternal, and possibly , outcome.                Risk factors for preeclampsia include: Nulliparity,  Preeclampsia in a previous pregnancy,  Age >40 years or <18 years,  Family histor late in gestation in primigravid women and followed by a normotensive pregnancy does not appear to be associated with increased remote cardiovascular risk. Many studies have been performed to try to find a way to prevent preeclampsia.   These inclu restriction) by about 10 to 20 percent. Low dose aspirin is safe in pregnancy; thus, it is a reasonable strategy in women with a moderate to high risk of preeclampsia in whom the benefits outweigh the risks.      Clinical Guidelines  Based on the available including the [facility] [[place]]) may be more effective. In some pregnant women, platelet function is not inhibited by the 81 mg dose but is altered by higher dosing.  Hence, current evidence has suggested a daily dose of 100 to 150 mg of aspirin rather th IMPRESSION:  · IUP at 20w3d  · Morbid Obesity-BMI 47    RECOMMENDATIONS:  · Continue care with Dr. Del Castillo Other  11-20 lb weight gain for pregnancy  Monthly growth ultrasounds starting at 28 weeks, add BPP to each growth ultrasound at 32 weeks and beyond  Week

## 2022-01-24 ENCOUNTER — TELEMEDICINE (OUTPATIENT)
Dept: INTERNAL MEDICINE CLINIC | Facility: CLINIC | Age: 27
End: 2022-01-24

## 2022-01-24 DIAGNOSIS — J40 BRONCHITIS: Primary | ICD-10-CM

## 2022-01-24 DIAGNOSIS — R05.9 COUGH: ICD-10-CM

## 2022-01-24 PROCEDURE — 99214 OFFICE O/P EST MOD 30 MIN: CPT | Performed by: INTERNAL MEDICINE

## 2022-01-24 RX ORDER — PREDNISONE 20 MG/1
TABLET ORAL
Qty: 8 TABLET | Refills: 0 | Status: SHIPPED | OUTPATIENT
Start: 2022-01-24

## 2022-01-24 RX ORDER — BENZONATATE 200 MG/1
200 CAPSULE ORAL 3 TIMES DAILY PRN
Qty: 45 CAPSULE | Refills: 0 | Status: SHIPPED | OUTPATIENT
Start: 2022-01-24

## 2022-01-24 RX ORDER — BUDESONIDE 180 UG/1
1 AEROSOL, POWDER RESPIRATORY (INHALATION) 2 TIMES DAILY
Qty: 1 EACH | Refills: 1 | Status: SHIPPED | OUTPATIENT
Start: 2022-01-24

## 2022-01-24 RX ORDER — AZITHROMYCIN 250 MG/1
TABLET, FILM COATED ORAL
Qty: 6 TABLET | Refills: 0 | Status: SHIPPED | OUTPATIENT
Start: 2022-01-24 | End: 2022-01-29

## 2022-01-25 ENCOUNTER — TELEPHONE (OUTPATIENT)
Dept: OBGYN CLINIC | Facility: CLINIC | Age: 27
End: 2022-01-25

## 2022-01-25 NOTE — TELEPHONE ENCOUNTER
Pt called states that she was given multiple medications due to illness and wanting to make sure all ok to take during time of pregnancy. [facility].

## 2022-01-25 NOTE — TELEPHONE ENCOUNTER
All meds ok except benzonatate. Returned call to patient. Advised all meds ok except benzonatate/Tesalejandrina Juarez. Advised these are not ok in pregnancy or breastfeeding. Advised she can use Robitussin or Robitussin DM. She states understanding.

## 2022-01-25 NOTE — TELEPHONE ENCOUNTER
Patient called again, wants to start medication prescribed by pcp for bronchitis but wants to make sure safe to take. Please call.

## 2022-01-25 NOTE — PROGRESS NOTES
Virtual Telephone Check-In    Rosa Luna verbally consents to a Virtual/Telephone Check-In visit on 01/24/22. Patient has been referred to the [facility] website at [email or web address] to review the yearly Consent to Treat document.     Patient unders 500 MG Oral Tab Take 1 tablet (500 mg total) by mouth 2 (two) times daily.  60 tablet 1       PAST MEDICAL, SOCIAL, AND FAMILY HISTORY:  Tobacco:      Smoking status: Former Smoker 0.50 Packs/day For 3.00 Years       PHYSICAL EXAM:  LMP 09/05/2021 (Exact Da needed for cough. Dispense: 45 capsule; Refill: 0  - azithromycin 250 MG Oral Tab; Take 2 tablets (500 mg total) by mouth daily for 1 day, THEN 1 tablet (250 mg total) daily for 4 days. Dispense: 6 tablet;  Refill: 0  - predniSONE 20 MG Oral Tab; 40mg for

## 2022-02-01 ENCOUNTER — ROUTINE PRENATAL (OUTPATIENT)
Dept: OBGYN CLINIC | Facility: CLINIC | Age: 27
End: 2022-02-01
Payer: COMMERCIAL

## 2022-02-01 VITALS — SYSTOLIC BLOOD PRESSURE: 124 MMHG | DIASTOLIC BLOOD PRESSURE: 76 MMHG | WEIGHT: 293 LBS | BODY MASS INDEX: 47 KG/M2

## 2022-02-01 DIAGNOSIS — Z34.90 PRENATAL CARE, ANTEPARTUM: ICD-10-CM

## 2022-02-01 DIAGNOSIS — O99.210 OBESITY IN PREGNANCY: Primary | ICD-10-CM

## 2022-02-01 PROCEDURE — 3078F DIAST BP <80 MM HG: CPT | Performed by: OBSTETRICS & GYNECOLOGY

## 2022-02-01 PROCEDURE — 3074F SYST BP LT 130 MM HG: CPT | Performed by: OBSTETRICS & GYNECOLOGY

## 2022-02-01 PROCEDURE — 81002 URINALYSIS NONAUTO W/O SCOPE: CPT | Performed by: OBSTETRICS & GYNECOLOGY

## 2022-02-01 RX ORDER — COVID-19 MOLECULAR TEST ASSAY
KIT MISCELLANEOUS
COMMUNITY
Start: 2022-01-23 | End: 2022-03-01

## 2022-02-25 ENCOUNTER — LAB ENCOUNTER (OUTPATIENT)
Dept: LAB | Age: 27
End: 2022-02-25
Attending: OBSTETRICS & GYNECOLOGY
Payer: COMMERCIAL

## 2022-02-25 DIAGNOSIS — O99.210 OBESITY IN PREGNANCY: ICD-10-CM

## 2022-02-25 LAB
BASOPHILS # BLD AUTO: 0.02 X10(3) UL (ref 0–0.2)
BASOPHILS NFR BLD AUTO: 0.2 %
DEPRECATED HBV CORE AB SER IA-ACNC: 29.8 NG/ML
EOSINOPHIL # BLD AUTO: 0.06 X10(3) UL (ref 0–0.7)
EOSINOPHIL NFR BLD AUTO: 0.6 %
ERYTHROCYTE [DISTWIDTH] IN BLOOD BY AUTOMATED COUNT: 14.3 %
GLUCOSE 1H P GLC SERPL-MCNC: 114 MG/DL
HCT VFR BLD AUTO: 35.2 %
HGB BLD-MCNC: 11 G/DL
IMM GRANULOCYTES # BLD AUTO: 0.06 X10(3) UL (ref 0–1)
IMM GRANULOCYTES NFR BLD: 0.6 %
LYMPHOCYTES # BLD AUTO: 1.97 X10(3) UL (ref 1–4)
LYMPHOCYTES NFR BLD AUTO: 18.9 %
MCH RBC QN AUTO: 28.9 PG (ref 26–34)
MCHC RBC AUTO-ENTMCNC: 31.3 G/DL (ref 31–37)
MCV RBC AUTO: 92.6 FL
MONOCYTES # BLD AUTO: 0.37 X10(3) UL (ref 0.1–1)
MONOCYTES NFR BLD AUTO: 3.5 %
NEUTROPHILS # BLD AUTO: 7.97 X10 (3) UL (ref 1.5–7.7)
NEUTROPHILS # BLD AUTO: 7.97 X10(3) UL (ref 1.5–7.7)
NEUTROPHILS NFR BLD AUTO: 76.2 %
PLATELET # BLD AUTO: 216 10(3)UL (ref 150–450)
RBC # BLD AUTO: 3.8 X10(6)UL
WBC # BLD AUTO: 10.5 X10(3) UL (ref 4–11)

## 2022-02-25 PROCEDURE — 82728 ASSAY OF FERRITIN: CPT

## 2022-02-25 PROCEDURE — 85025 COMPLETE CBC W/AUTO DIFF WBC: CPT

## 2022-02-25 PROCEDURE — 82950 GLUCOSE TEST: CPT

## 2022-03-01 ENCOUNTER — ROUTINE PRENATAL (OUTPATIENT)
Dept: OBGYN CLINIC | Facility: CLINIC | Age: 27
End: 2022-03-01
Payer: COMMERCIAL

## 2022-03-01 VITALS — BODY MASS INDEX: 48 KG/M2 | SYSTOLIC BLOOD PRESSURE: 132 MMHG | WEIGHT: 293 LBS | DIASTOLIC BLOOD PRESSURE: 80 MMHG

## 2022-03-01 DIAGNOSIS — O26.899 RH NEGATIVE, ANTEPARTUM: ICD-10-CM

## 2022-03-01 DIAGNOSIS — Z34.90 PRENATAL CARE, ANTEPARTUM: Primary | ICD-10-CM

## 2022-03-01 DIAGNOSIS — O99.019 ANTEPARTUM ANEMIA: ICD-10-CM

## 2022-03-01 DIAGNOSIS — Z67.91 RH NEGATIVE, ANTEPARTUM: ICD-10-CM

## 2022-03-01 LAB
GLUCOSE (URINE DIPSTICK): NEGATIVE MG/DL
MULTISTIX LOT#: NORMAL NUMERIC

## 2022-03-01 PROCEDURE — 81002 URINALYSIS NONAUTO W/O SCOPE: CPT | Performed by: OBSTETRICS & GYNECOLOGY

## 2022-03-01 PROCEDURE — 3079F DIAST BP 80-89 MM HG: CPT | Performed by: OBSTETRICS & GYNECOLOGY

## 2022-03-01 PROCEDURE — 3075F SYST BP GE 130 - 139MM HG: CPT | Performed by: OBSTETRICS & GYNECOLOGY

## 2022-03-01 RX ORDER — FERROUS SULFATE 325(65) MG
325 TABLET ORAL
Qty: 90 TABLET | Refills: 3 | Status: SHIPPED | OUTPATIENT
Start: 2022-03-01

## 2022-03-01 NOTE — PROGRESS NOTES
MINOO  [id number]  GA: 26w2d   03/01/22  1158   BP: 132/80   Weight: (!) 324 lb 6.4 oz (147.1 kg)       Doing well. Denies LOF/VB/uctx. +FM. RH negative, RhoGam at 28 weeks  Repeat HIV lab test ordered and discussed with patient  S/P Anatomy scan per level 2 US  1 hr glucose and CBC (24-28wks) - wnl   Obesity BMI 47, continue MFM consult with serial ultrasound. Monthly growth scan. NST weekly at 34 weeks. Hx of gTHN, continue low-dose aspirin  Hx of HSV, will need suppression therapy at 36 weeks    RTC q2wks      Note to patient and family   The [facility] makes medical notes available to patients in the interest of transparency. However, please be advised that this is a medical document. It is intended as plox-ss-rajc communication. It is written and medical language may contain abbreviations or verbiage that are technical and unfamiliar. It may appear blunt or direct. Medical documents are intended to carry relevant information, facts as evident, and the clinical opinion of the practitioner.

## 2022-03-07 NOTE — PATIENT INSTRUCTIONS
"  Chief Complaint   Patient presents with   • 3 Month Follow Up      Subjective   Timi Hernandez is a 39 y.o. male who presents to the office for follow-up.     The ferritin level is high. He needs to see a hematologist.    b12 def, needs a b12 shot today.      The following portions of the patient's history were reviewed and updated as appropriate: allergies, current medications, past family history, past medical history, past social history, past surgical history and problem list.    Review of Systems   Constitutional: Negative for fever.   Cardiovascular: Negative for palpitations.   Gastrointestinal: Negative for abdominal pain and vomiting.   Genitourinary: Negative for urinary incontinence.        Objective   Vitals:    03/07/22 0825   BP: 130/84   Pulse: 69   Resp: 20   Temp: 97.5 °F (36.4 °C)   SpO2: 99%   Weight: 110 kg (243 lb 1.6 oz)   Height: 182.9 cm (72\")     Body mass index is 32.97 kg/m².  Physical Exam  Vitals reviewed.   Constitutional:       General: He is not in acute distress.     Appearance: Normal appearance. He is not ill-appearing.   HENT:      Head: Normocephalic.   Eyes:      Conjunctiva/sclera: Conjunctivae normal.   Cardiovascular:      Rate and Rhythm: Normal rate and regular rhythm.   Pulmonary:      Effort: Pulmonary effort is normal.      Breath sounds: Normal breath sounds.   Skin:     Findings: No lesion or rash.   Neurological:      Mental Status: He is alert and oriented to person, place, and time.   Psychiatric:         Mood and Affect: Mood normal.          Assessment/Plan   Diagnoses and all orders for this visit:    1. B12 deficiency  Comments:  b12 shot given today    2. Elevated ferritin level  Comments:  referrel to hematologist  Orders:  -     Ambulatory Referral to Hematology               No follow-ups on file.   No flowsheet data found.    " Kick Counts    It’s normal to worry about your baby’s health. One way you can know your baby’s doing well is to record the baby’s movements once a day. This is called a kick count.  Remember to take your kick count records to all your appointments with your

## 2022-03-08 ENCOUNTER — LABORATORY ENCOUNTER (OUTPATIENT)
Dept: LAB | Age: 27
End: 2022-03-08
Attending: OBSTETRICS & GYNECOLOGY
Payer: COMMERCIAL

## 2022-03-08 DIAGNOSIS — O26.899 RH NEGATIVE, ANTEPARTUM: ICD-10-CM

## 2022-03-08 DIAGNOSIS — Z67.91 RH NEGATIVE, ANTEPARTUM: ICD-10-CM

## 2022-03-08 DIAGNOSIS — Z34.90 PRENATAL CARE, ANTEPARTUM: ICD-10-CM

## 2022-03-08 LAB
ANTIBODY SCREEN: NEGATIVE
RH BLOOD TYPE: NEGATIVE

## 2022-03-08 PROCEDURE — 86900 BLOOD TYPING SEROLOGIC ABO: CPT | Performed by: OBSTETRICS & GYNECOLOGY

## 2022-03-08 PROCEDURE — 86850 RBC ANTIBODY SCREEN: CPT | Performed by: OBSTETRICS & GYNECOLOGY

## 2022-03-08 PROCEDURE — 87389 HIV-1 AG W/HIV-1&-2 AB AG IA: CPT | Performed by: OBSTETRICS & GYNECOLOGY

## 2022-03-08 PROCEDURE — 86901 BLOOD TYPING SEROLOGIC RH(D): CPT | Performed by: OBSTETRICS & GYNECOLOGY

## 2022-03-14 ENCOUNTER — ROUTINE PRENATAL (OUTPATIENT)
Dept: OBGYN CLINIC | Facility: CLINIC | Age: 27
End: 2022-03-14
Payer: COMMERCIAL

## 2022-03-14 VITALS — DIASTOLIC BLOOD PRESSURE: 60 MMHG | SYSTOLIC BLOOD PRESSURE: 120 MMHG | BODY MASS INDEX: 48 KG/M2 | WEIGHT: 293 LBS

## 2022-03-14 DIAGNOSIS — Z23 NEED FOR VACCINATION: ICD-10-CM

## 2022-03-14 DIAGNOSIS — Z67.91 RH NEGATIVE STATUS DURING PREGNANCY IN THIRD TRIMESTER: ICD-10-CM

## 2022-03-14 DIAGNOSIS — O26.893 RH NEGATIVE STATUS DURING PREGNANCY IN THIRD TRIMESTER: ICD-10-CM

## 2022-03-14 DIAGNOSIS — Z34.80 PRENATAL CARE OF MULTIGRAVIDA, ANTEPARTUM: Primary | ICD-10-CM

## 2022-03-14 PROCEDURE — 3074F SYST BP LT 130 MM HG: CPT | Performed by: OBSTETRICS & GYNECOLOGY

## 2022-03-14 PROCEDURE — 90471 IMMUNIZATION ADMIN: CPT | Performed by: OBSTETRICS & GYNECOLOGY

## 2022-03-14 PROCEDURE — 90715 TDAP VACCINE 7 YRS/> IM: CPT | Performed by: OBSTETRICS & GYNECOLOGY

## 2022-03-14 PROCEDURE — 96372 THER/PROPH/DIAG INJ SC/IM: CPT | Performed by: OBSTETRICS & GYNECOLOGY

## 2022-03-14 PROCEDURE — 81002 URINALYSIS NONAUTO W/O SCOPE: CPT | Performed by: OBSTETRICS & GYNECOLOGY

## 2022-03-14 PROCEDURE — 3078F DIAST BP <80 MM HG: CPT | Performed by: OBSTETRICS & GYNECOLOGY

## 2022-03-14 NOTE — PROGRESS NOTES
[id number] 28w1d seen for routine OB visit. Denies ctx, lof, vb. Reports good FM. Patient Active Problem List:     Migraines     BMI 40.0-44.9, adult ([facility] [address]     History of gestational hypertension     Glaucoma suspect of both eyes     Obesity in pregnancy     Genital HSV     Rh negative status during pregnancy       Gen: AAOx3, NAD  Resp: breathing comfortably  Abdomen: gravid, soft, nontender  Ext: nontender, no edema    Plan:  - tdap and rhogam today  - repeat HIV neg  - MFM appt tomorrow, q 4wk  - weekly NSTs at 34wks  - return precautions reviewed    RTO in 2 week(s).

## 2022-03-15 ENCOUNTER — ULTRASOUND ENCOUNTER (OUTPATIENT)
Dept: PERINATAL CARE | Facility: HOSPITAL | Age: 27
End: 2022-03-15
Attending: OBSTETRICS & GYNECOLOGY
Payer: COMMERCIAL

## 2022-03-15 VITALS
DIASTOLIC BLOOD PRESSURE: 82 MMHG | HEART RATE: 87 BPM | HEIGHT: 69 IN | BODY MASS INDEX: 43.4 KG/M2 | SYSTOLIC BLOOD PRESSURE: 141 MMHG | WEIGHT: 293 LBS

## 2022-03-15 DIAGNOSIS — O99.210 MATERNAL MORBID OBESITY, ANTEPARTUM (HCC): Primary | ICD-10-CM

## 2022-03-15 DIAGNOSIS — E66.01 MATERNAL MORBID OBESITY, ANTEPARTUM (HCC): Primary | ICD-10-CM

## 2022-03-15 DIAGNOSIS — O99.210 OBESITY IN PREGNANCY: ICD-10-CM

## 2022-03-15 DIAGNOSIS — O99.210 MATERNAL MORBID OBESITY, ANTEPARTUM (HCC): ICD-10-CM

## 2022-03-15 DIAGNOSIS — E66.01 MATERNAL MORBID OBESITY, ANTEPARTUM (HCC): ICD-10-CM

## 2022-03-15 PROCEDURE — 76816 OB US FOLLOW-UP PER FETUS: CPT | Performed by: OBSTETRICS & GYNECOLOGY

## 2022-03-15 PROCEDURE — 99213 OFFICE O/P EST LOW 20 MIN: CPT | Performed by: OBSTETRICS & GYNECOLOGY

## 2022-03-21 ENCOUNTER — TELEPHONE (OUTPATIENT)
Dept: OBGYN CLINIC | Facility: CLINIC | Age: 27
End: 2022-03-21

## 2022-03-21 RX ORDER — METRONIDAZOLE 500 MG/1
500 TABLET ORAL 2 TIMES DAILY
Qty: 14 TABLET | Refills: 0 | Status: SHIPPED | OUTPATIENT
Start: 2022-03-21 | End: 2022-03-28

## 2022-03-21 NOTE — TELEPHONE ENCOUNTER
GA 29 weeks  Patient states she started having watery discharge and a fishy odor on   She thinks she has BV   Flagyl ordered  Patient denies any other concerns  She knows to keep her appointment on 2022

## 2022-03-29 ENCOUNTER — ROUTINE PRENATAL (OUTPATIENT)
Dept: OBGYN CLINIC | Facility: CLINIC | Age: 27
End: 2022-03-29
Payer: COMMERCIAL

## 2022-03-29 ENCOUNTER — TELEMEDICINE (OUTPATIENT)
Dept: INTERNAL MEDICINE CLINIC | Facility: CLINIC | Age: 27
End: 2022-03-29

## 2022-03-29 VITALS
WEIGHT: 293 LBS | SYSTOLIC BLOOD PRESSURE: 130 MMHG | BODY MASS INDEX: 43.4 KG/M2 | DIASTOLIC BLOOD PRESSURE: 70 MMHG | HEIGHT: 69 IN

## 2022-03-29 DIAGNOSIS — L03.116 CELLULITIS OF LEFT LOWER EXTREMITY: Primary | ICD-10-CM

## 2022-03-29 DIAGNOSIS — O99.210 OBESITY IN PREGNANCY: Primary | ICD-10-CM

## 2022-03-29 PROCEDURE — 99213 OFFICE O/P EST LOW 20 MIN: CPT | Performed by: INTERNAL MEDICINE

## 2022-03-29 PROCEDURE — 3078F DIAST BP <80 MM HG: CPT | Performed by: OBSTETRICS & GYNECOLOGY

## 2022-03-29 PROCEDURE — 3008F BODY MASS INDEX DOCD: CPT | Performed by: OBSTETRICS & GYNECOLOGY

## 2022-03-29 PROCEDURE — 3075F SYST BP GE 130 - 139MM HG: CPT | Performed by: OBSTETRICS & GYNECOLOGY

## 2022-03-29 RX ORDER — CEPHALEXIN 500 MG/1
500 CAPSULE ORAL 3 TIMES DAILY
Qty: 21 CAPSULE | Refills: 0 | Status: SHIPPED | OUTPATIENT
Start: 2022-03-29

## 2022-04-12 ENCOUNTER — ULTRASOUND ENCOUNTER (OUTPATIENT)
Dept: PERINATAL CARE | Facility: HOSPITAL | Age: 27
End: 2022-04-12
Attending: OBSTETRICS & GYNECOLOGY
Payer: COMMERCIAL

## 2022-04-12 ENCOUNTER — ROUTINE PRENATAL (OUTPATIENT)
Dept: OBGYN CLINIC | Facility: CLINIC | Age: 27
End: 2022-04-12
Payer: COMMERCIAL

## 2022-04-12 VITALS
BODY MASS INDEX: 49 KG/M2 | HEART RATE: 91 BPM | DIASTOLIC BLOOD PRESSURE: 84 MMHG | WEIGHT: 293 LBS | SYSTOLIC BLOOD PRESSURE: 136 MMHG

## 2022-04-12 VITALS
HEART RATE: 85 BPM | SYSTOLIC BLOOD PRESSURE: 135 MMHG | DIASTOLIC BLOOD PRESSURE: 80 MMHG | BODY MASS INDEX: 48 KG/M2 | WEIGHT: 293 LBS

## 2022-04-12 DIAGNOSIS — E66.01 MATERNAL MORBID OBESITY, ANTEPARTUM (HCC): Primary | ICD-10-CM

## 2022-04-12 DIAGNOSIS — O99.210 MATERNAL MORBID OBESITY, ANTEPARTUM (HCC): Primary | ICD-10-CM

## 2022-04-12 DIAGNOSIS — O99.210 MATERNAL MORBID OBESITY, ANTEPARTUM (HCC): ICD-10-CM

## 2022-04-12 DIAGNOSIS — Z87.59 HISTORY OF GESTATIONAL HYPERTENSION: ICD-10-CM

## 2022-04-12 DIAGNOSIS — O99.210 OBESITY IN PREGNANCY: ICD-10-CM

## 2022-04-12 DIAGNOSIS — Z34.80 PRENATAL CARE OF MULTIGRAVIDA, ANTEPARTUM: Primary | ICD-10-CM

## 2022-04-12 DIAGNOSIS — E66.01 MATERNAL MORBID OBESITY, ANTEPARTUM (HCC): ICD-10-CM

## 2022-04-12 LAB
GLUCOSE (URINE DIPSTICK): NEGATIVE MG/DL
MULTISTIX EXPIRATION DATE: NORMAL DATE
MULTISTIX LOT#: NORMAL NUMERIC

## 2022-04-12 PROCEDURE — 76819 FETAL BIOPHYS PROFIL W/O NST: CPT

## 2022-04-12 PROCEDURE — 76816 OB US FOLLOW-UP PER FETUS: CPT | Performed by: OBSTETRICS & GYNECOLOGY

## 2022-04-12 PROCEDURE — 76819 FETAL BIOPHYS PROFIL W/O NST: CPT | Performed by: OBSTETRICS & GYNECOLOGY

## 2022-04-12 PROCEDURE — 3075F SYST BP GE 130 - 139MM HG: CPT | Performed by: OBSTETRICS & GYNECOLOGY

## 2022-04-12 PROCEDURE — 3079F DIAST BP 80-89 MM HG: CPT | Performed by: OBSTETRICS & GYNECOLOGY

## 2022-04-12 PROCEDURE — 81002 URINALYSIS NONAUTO W/O SCOPE: CPT | Performed by: OBSTETRICS & GYNECOLOGY

## 2022-04-12 NOTE — PROGRESS NOTES
MINOO 32w2d    Doing well, +FM  No regular contractions but having some pressure and issues with sciatica. No LOF, VB  Had US with MFM this AM    1. FHT-present  2. PNL:  HIV neg, reviewed GBS at 36+ weeks  3. Mode of delivery:  anticipated  4. Obesity in pregnancy: normal growth US today, f/u in 4 weeks. Start NSTs weekly at next visit at 29 weeks.    5. Immunizations: s/p TDAP  6. H/o GHTN: continue to follow BPs    Return in 2 weeks

## 2022-04-26 ENCOUNTER — ROUTINE PRENATAL (OUTPATIENT)
Dept: OBGYN CLINIC | Facility: CLINIC | Age: 27
End: 2022-04-26
Payer: COMMERCIAL

## 2022-04-26 ENCOUNTER — APPOINTMENT (OUTPATIENT)
Dept: OBGYN CLINIC | Facility: CLINIC | Age: 27
End: 2022-04-26
Payer: COMMERCIAL

## 2022-04-26 VITALS — SYSTOLIC BLOOD PRESSURE: 124 MMHG | WEIGHT: 293 LBS | DIASTOLIC BLOOD PRESSURE: 78 MMHG | BODY MASS INDEX: 49 KG/M2

## 2022-04-26 DIAGNOSIS — Z34.90 PRENATAL CARE, ANTEPARTUM: Primary | ICD-10-CM

## 2022-04-26 DIAGNOSIS — A60.00 GENITAL HERPES SIMPLEX, UNSPECIFIED SITE: ICD-10-CM

## 2022-04-26 DIAGNOSIS — O99.210 OBESITY IN PREGNANCY: ICD-10-CM

## 2022-04-26 LAB
GLUCOSE (URINE DIPSTICK): NEGATIVE MG/DL
MULTISTIX LOT#: ABNORMAL NUMERIC
PROTEIN (URINE DIPSTICK): 30 MG/DL

## 2022-04-26 PROCEDURE — 81002 URINALYSIS NONAUTO W/O SCOPE: CPT | Performed by: OBSTETRICS & GYNECOLOGY

## 2022-04-26 PROCEDURE — 59025 FETAL NON-STRESS TEST: CPT | Performed by: OBSTETRICS & GYNECOLOGY

## 2022-04-26 PROCEDURE — 3074F SYST BP LT 130 MM HG: CPT | Performed by: OBSTETRICS & GYNECOLOGY

## 2022-04-26 PROCEDURE — 3078F DIAST BP <80 MM HG: CPT | Performed by: OBSTETRICS & GYNECOLOGY

## 2022-04-26 RX ORDER — VALACYCLOVIR HYDROCHLORIDE 500 MG/1
500 TABLET, FILM COATED ORAL 2 TIMES DAILY
Qty: 60 TABLET | Refills: 0 | Status: SHIPPED | OUTPATIENT
Start: 2022-04-26

## 2022-04-26 NOTE — PROGRESS NOTES
Patient presents with:  Prenatal Care: [place] after nst    Routine prenatal visit without complaints. Patient denies any bleeding, leaking fluid, cramping, or regular uterine contractions. Good fetal movement. NST: see report. Reactive  Assessment/Plan:  34w2d doing well  Obesity- weekly NST  Genital HSV- Valtrex Rx sent  Kick counts reviewed. Reviewed  labor signs and symptoms. Diagnoses and all orders for this visit:    Prenatal care, antepartum  -     URINALYSIS NONAUTO W/O SCOPE (OB URISTIX)    Obesity in pregnancy  -     FETAL NON-STRESS TEST [facility] ONLY 24051; Future    Genital herpes simplex, unspecified site  -     valACYclovir (VALTREX) 500 MG Oral Tab; Take 1 tablet (500 mg total) by mouth 2 (two) times daily.        Return in about 1 week (around 5/3/2022) for Routine Prenatal Visit, NST.

## 2022-05-03 ENCOUNTER — APPOINTMENT (OUTPATIENT)
Dept: OBGYN CLINIC | Facility: CLINIC | Age: 27
End: 2022-05-03
Payer: COMMERCIAL

## 2022-05-03 ENCOUNTER — ROUTINE PRENATAL (OUTPATIENT)
Dept: OBGYN CLINIC | Facility: CLINIC | Age: 27
End: 2022-05-03
Payer: COMMERCIAL

## 2022-05-03 VITALS — SYSTOLIC BLOOD PRESSURE: 110 MMHG | DIASTOLIC BLOOD PRESSURE: 70 MMHG | BODY MASS INDEX: 49 KG/M2 | WEIGHT: 293 LBS

## 2022-05-03 DIAGNOSIS — O26.899 RH NEGATIVE, ANTEPARTUM: ICD-10-CM

## 2022-05-03 DIAGNOSIS — Z67.91 RH NEGATIVE, ANTEPARTUM: ICD-10-CM

## 2022-05-03 DIAGNOSIS — Z34.80 PRENATAL CARE OF MULTIGRAVIDA, ANTEPARTUM: ICD-10-CM

## 2022-05-03 DIAGNOSIS — O99.210 OBESITY IN PREGNANCY: Primary | ICD-10-CM

## 2022-05-03 PROCEDURE — 3078F DIAST BP <80 MM HG: CPT | Performed by: OBSTETRICS & GYNECOLOGY

## 2022-05-03 PROCEDURE — 59025 FETAL NON-STRESS TEST: CPT | Performed by: OBSTETRICS & GYNECOLOGY

## 2022-05-03 PROCEDURE — 3074F SYST BP LT 130 MM HG: CPT | Performed by: OBSTETRICS & GYNECOLOGY

## 2022-05-03 PROCEDURE — 81002 URINALYSIS NONAUTO W/O SCOPE: CPT | Performed by: OBSTETRICS & GYNECOLOGY

## 2022-05-10 ENCOUNTER — ULTRASOUND ENCOUNTER (OUTPATIENT)
Dept: PERINATAL CARE | Facility: HOSPITAL | Age: 27
End: 2022-05-10
Attending: OBSTETRICS & GYNECOLOGY
Payer: COMMERCIAL

## 2022-05-10 ENCOUNTER — ROUTINE PRENATAL (OUTPATIENT)
Dept: OBGYN CLINIC | Facility: CLINIC | Age: 27
End: 2022-05-10
Payer: COMMERCIAL

## 2022-05-10 ENCOUNTER — APPOINTMENT (OUTPATIENT)
Dept: OBGYN CLINIC | Facility: CLINIC | Age: 27
End: 2022-05-10
Payer: COMMERCIAL

## 2022-05-10 ENCOUNTER — TELEPHONE (OUTPATIENT)
Dept: OBGYN CLINIC | Facility: CLINIC | Age: 27
End: 2022-05-10

## 2022-05-10 VITALS — WEIGHT: 293 LBS | SYSTOLIC BLOOD PRESSURE: 138 MMHG | DIASTOLIC BLOOD PRESSURE: 72 MMHG | BODY MASS INDEX: 50 KG/M2

## 2022-05-10 VITALS
BODY MASS INDEX: 43.4 KG/M2 | DIASTOLIC BLOOD PRESSURE: 85 MMHG | HEIGHT: 69 IN | HEART RATE: 80 BPM | SYSTOLIC BLOOD PRESSURE: 137 MMHG | WEIGHT: 293 LBS

## 2022-05-10 DIAGNOSIS — O99.210 OBESITY IN PREGNANCY: ICD-10-CM

## 2022-05-10 DIAGNOSIS — Z34.90 PRENATAL CARE, ANTEPARTUM: Primary | ICD-10-CM

## 2022-05-10 DIAGNOSIS — Z67.91 RH NEGATIVE, ANTEPARTUM: ICD-10-CM

## 2022-05-10 DIAGNOSIS — Z34.90 ENCOUNTER FOR SUPERVISION OF NORMAL PREGNANCY, ANTEPARTUM, UNSPECIFIED GRAVIDITY: ICD-10-CM

## 2022-05-10 DIAGNOSIS — Z87.59 HISTORY OF GESTATIONAL HYPERTENSION: ICD-10-CM

## 2022-05-10 DIAGNOSIS — O99.213 OBESITY AFFECTING PREGNANCY IN THIRD TRIMESTER: Primary | ICD-10-CM

## 2022-05-10 DIAGNOSIS — O99.213 OBESITY AFFECTING PREGNANCY IN THIRD TRIMESTER: ICD-10-CM

## 2022-05-10 DIAGNOSIS — O26.899 RH NEGATIVE, ANTEPARTUM: ICD-10-CM

## 2022-05-10 LAB
GLUCOSE (URINE DIPSTICK): NEGATIVE MG/DL
MULTISTIX LOT#: NORMAL NUMERIC

## 2022-05-10 PROCEDURE — 81002 URINALYSIS NONAUTO W/O SCOPE: CPT | Performed by: OBSTETRICS & GYNECOLOGY

## 2022-05-10 PROCEDURE — 3075F SYST BP GE 130 - 139MM HG: CPT | Performed by: OBSTETRICS & GYNECOLOGY

## 2022-05-10 PROCEDURE — 59025 FETAL NON-STRESS TEST: CPT | Performed by: OBSTETRICS & GYNECOLOGY

## 2022-05-10 PROCEDURE — 3078F DIAST BP <80 MM HG: CPT | Performed by: OBSTETRICS & GYNECOLOGY

## 2022-05-10 PROCEDURE — 87184 SC STD DISK METHOD PER PLATE: CPT | Performed by: OBSTETRICS & GYNECOLOGY

## 2022-05-10 PROCEDURE — 76819 FETAL BIOPHYS PROFIL W/O NST: CPT

## 2022-05-10 PROCEDURE — 87653 STREP B DNA AMP PROBE: CPT | Performed by: OBSTETRICS & GYNECOLOGY

## 2022-05-10 PROCEDURE — 87081 CULTURE SCREEN ONLY: CPT | Performed by: OBSTETRICS & GYNECOLOGY

## 2022-05-10 PROCEDURE — 76816 OB US FOLLOW-UP PER FETUS: CPT | Performed by: OBSTETRICS & GYNECOLOGY

## 2022-05-10 RX ORDER — COVID-19 MOLECULAR TEST ASSAY
KIT MISCELLANEOUS
COMMUNITY
Start: 2022-04-28

## 2022-05-10 NOTE — TELEPHONE ENCOUNTER
----- Message from Rosales Collado MD sent at 5/10/2022  2:25 PM CDT -----  MARJ sy pt for IOL on 5/31, thank you

## 2022-05-10 NOTE — PROGRESS NOTES
Pt here for growth ultrasound  +FM  Pt reports some mild headaches that lessen with tylenol. +1 edema bilaterally on Lower extremities that fluctuates throughout the day per pt report. Pt denies vision changes or epigastric pain.

## 2022-05-10 NOTE — PROGRESS NOTES
MINOO 36w2d    Doing ok, more discomfort noted in pelvis, +FM  Irregular contractions  No LOF, VB  SVE:    1. FHT-NST reactive  2. PNL:  GBS collected  3. Mode of delivery:  anticipated. On US today EFW was 8#8oz. I encouraged her to consider term IOL at 39 weeks, which she was scheduled for on . She will consider. We reviewed that her EFW at this time would likely be 4500 grams at time of delivery. I would not recommend she go past her STEVEN with this information as well as risk factors of obesity. She is going to consider and let us know  4. HSV: start valtrex  5. History of GHTN: watch BP  6.  Obesity: NST reactive    Return in 1 weeks

## 2022-05-12 PROBLEM — A49.1 GROUP B STREPTOCOCCAL INFECTION: Status: ACTIVE | Noted: 2022-05-12

## 2022-05-14 LAB — GROUP B STREP BY PCR FOR PCR OVT: POSITIVE

## 2022-05-17 ENCOUNTER — ROUTINE PRENATAL (OUTPATIENT)
Dept: OBGYN CLINIC | Facility: CLINIC | Age: 27
End: 2022-05-17
Payer: COMMERCIAL

## 2022-05-17 ENCOUNTER — APPOINTMENT (OUTPATIENT)
Dept: OBGYN CLINIC | Facility: CLINIC | Age: 27
End: 2022-05-17
Payer: COMMERCIAL

## 2022-05-17 ENCOUNTER — TELEPHONE (OUTPATIENT)
Dept: OBGYN CLINIC | Facility: CLINIC | Age: 27
End: 2022-05-17

## 2022-05-17 VITALS — WEIGHT: 293 LBS | BODY MASS INDEX: 50 KG/M2 | SYSTOLIC BLOOD PRESSURE: 140 MMHG | DIASTOLIC BLOOD PRESSURE: 98 MMHG

## 2022-05-17 DIAGNOSIS — O99.210 OBESITY IN PREGNANCY: ICD-10-CM

## 2022-05-17 DIAGNOSIS — Z34.80 PRENATAL CARE OF MULTIGRAVIDA, ANTEPARTUM: Primary | ICD-10-CM

## 2022-05-17 LAB
GLUCOSE (URINE DIPSTICK): NEGATIVE MG/DL
MULTISTIX LOT#: NORMAL NUMERIC

## 2022-05-17 PROCEDURE — 59025 FETAL NON-STRESS TEST: CPT | Performed by: OBSTETRICS & GYNECOLOGY

## 2022-05-17 PROCEDURE — 81002 URINALYSIS NONAUTO W/O SCOPE: CPT | Performed by: OBSTETRICS & GYNECOLOGY

## 2022-05-17 PROCEDURE — 3077F SYST BP >= 140 MM HG: CPT | Performed by: OBSTETRICS & GYNECOLOGY

## 2022-05-17 PROCEDURE — 3080F DIAST BP >= 90 MM HG: CPT | Performed by: OBSTETRICS & GYNECOLOGY

## 2022-05-17 NOTE — TELEPHONE ENCOUNTER
Patient brought in [id number] [facility] from [place]. PD $25.00. please fax to # on forms when complete. Also call patient to inform her that forms were faxed over.   Faxed to Carolyn in [address] [facility] for completion

## 2022-05-17 NOTE — PROGRESS NOTES
Patient presents with:  Prenatal Care: [place]/nst    Routine prenatal visit without complaints. Patient has been checking BP's at home. Last few days running 193'Q systolic with two different large cuffs. Patient denies any bleeding, leaking fluid, cramping, or regular uterine contractions. Good fetal movement. SVE: 2/40/-4 vertex soft very posterior  NST: see report. Reactive  Assessment/Plan:  37w2d doing well  GHtn- Induction scheduled 5/18/22 8:30 pm cytotec or cervidil/low dose pit  GBS pos  Kick counts reviewed. Reviewed labor signs and symptoms. Diagnoses and all orders for this visit:    Prenatal care of multigravida, antepartum  -     URINALYSIS NONAUTO W/O SCOPE (OB URISTIX)    Obesity in pregnancy  -     FETAL NON-STRESS TEST [facility] ONLY [id number];  Future       Return for Induction tomorrow night 8:30 pm.

## 2022-05-18 ENCOUNTER — TELEPHONE (OUTPATIENT)
Dept: OBGYN UNIT | Facility: HOSPITAL | Age: 27
End: 2022-05-18

## 2022-05-18 ENCOUNTER — HOSPITAL ENCOUNTER (INPATIENT)
Facility: HOSPITAL | Age: 27
LOS: 3 days | Discharge: HOME | End: 2022-05-21
Attending: OBSTETRICS & GYNECOLOGY | Admitting: OBSTETRICS & GYNECOLOGY
Payer: COMMERCIAL

## 2022-05-18 ENCOUNTER — APPOINTMENT (OUTPATIENT)
Dept: OBGYN CLINIC | Facility: HOSPITAL | Age: 27
End: 2022-05-18
Payer: COMMERCIAL

## 2022-05-18 PROBLEM — Z34.90 PREGNANCY: Status: ACTIVE | Noted: 2022-05-18

## 2022-05-18 PROBLEM — Z34.90 PREGNANCY (HCC): Status: ACTIVE | Noted: 2022-05-18

## 2022-05-18 LAB
ALBUMIN SERPL-MCNC: 2.5 G/DL (ref 3.4–5)
ALBUMIN/GLOB SERPL: 0.6 {RATIO} (ref 1–2)
ALP LIVER SERPL-CCNC: 122 U/L
ALT SERPL-CCNC: 14 U/L
ANION GAP SERPL CALC-SCNC: 9 MMOL/L (ref 0–18)
ANTIBODY SCREEN: NEGATIVE
AST SERPL-CCNC: 10 U/L (ref 15–37)
BASOPHILS # BLD AUTO: 0.03 X10(3) UL (ref 0–0.2)
BASOPHILS NFR BLD AUTO: 0.2 %
BILIRUB SERPL-MCNC: 0.3 MG/DL (ref 0.1–2)
BUN BLD-MCNC: 10 MG/DL (ref 7–18)
CALCIUM BLD-MCNC: 9.6 MG/DL (ref 8.5–10.1)
CHLORIDE SERPL-SCNC: 107 MMOL/L (ref 98–112)
CO2 SERPL-SCNC: 20 MMOL/L (ref 21–32)
CREAT BLD-MCNC: 0.55 MG/DL
EOSINOPHIL # BLD AUTO: 0.09 X10(3) UL (ref 0–0.7)
EOSINOPHIL NFR BLD AUTO: 0.6 %
ERYTHROCYTE [DISTWIDTH] IN BLOOD BY AUTOMATED COUNT: 13.3 %
GLOBULIN PLAS-MCNC: 4.3 G/DL (ref 2.8–4.4)
GLUCOSE BLD-MCNC: 88 MG/DL (ref 70–99)
HCT VFR BLD AUTO: 34.6 %
HGB BLD-MCNC: 11.5 G/DL
IMM GRANULOCYTES # BLD AUTO: 0.17 X10(3) UL (ref 0–1)
IMM GRANULOCYTES NFR BLD: 1.1 %
LYMPHOCYTES # BLD AUTO: 3.09 X10(3) UL (ref 1–4)
LYMPHOCYTES NFR BLD AUTO: 20.2 %
MCH RBC QN AUTO: 30.1 PG (ref 26–34)
MCHC RBC AUTO-ENTMCNC: 33.2 G/DL (ref 31–37)
MCV RBC AUTO: 90.6 FL
MONOCYTES # BLD AUTO: 0.69 X10(3) UL (ref 0.1–1)
MONOCYTES NFR BLD AUTO: 4.5 %
NEUTROPHILS # BLD AUTO: 11.22 X10 (3) UL (ref 1.5–7.7)
NEUTROPHILS # BLD AUTO: 11.22 X10(3) UL (ref 1.5–7.7)
NEUTROPHILS NFR BLD AUTO: 73.4 %
OSMOLALITY SERPL CALC.SUM OF ELEC: 280 MOSM/KG (ref 275–295)
PLATELET # BLD AUTO: 206 10(3)UL (ref 150–450)
POTASSIUM SERPL-SCNC: 3.7 MMOL/L (ref 3.5–5.1)
PROT SERPL-MCNC: 6.8 G/DL (ref 6.4–8.2)
RBC # BLD AUTO: 3.82 X10(6)UL
RH BLOOD TYPE: NEGATIVE
SARS-COV-2 RNA RESP QL NAA+PROBE: NOT DETECTED
SODIUM SERPL-SCNC: 136 MMOL/L (ref 136–145)
T PALLIDUM AB SER QL IA: NONREACTIVE
URATE SERPL-MCNC: 4.9 MG/DL
WBC # BLD AUTO: 15.3 X10(3) UL (ref 4–11)

## 2022-05-18 PROCEDURE — 3E0DXGC INTRODUCTION OF OTHER THERAPEUTIC SUBSTANCE INTO MOUTH AND PHARYNX, EXTERNAL APPROACH: ICD-10-PCS | Performed by: OBSTETRICS & GYNECOLOGY

## 2022-05-18 RX ORDER — ONDANSETRON 2 MG/ML
4 INJECTION INTRAMUSCULAR; INTRAVENOUS EVERY 6 HOURS PRN
Status: DISCONTINUED | OUTPATIENT
Start: 2022-05-18 | End: 2022-05-19 | Stop reason: HOSPADM

## 2022-05-18 RX ORDER — TRISODIUM CITRATE DIHYDRATE AND CITRIC ACID MONOHYDRATE 500; 334 MG/5ML; MG/5ML
30 SOLUTION ORAL AS NEEDED
Status: DISCONTINUED | OUTPATIENT
Start: 2022-05-18 | End: 2022-05-19 | Stop reason: HOSPADM

## 2022-05-18 RX ORDER — DEXTROSE, SODIUM CHLORIDE, SODIUM LACTATE, POTASSIUM CHLORIDE, AND CALCIUM CHLORIDE 5; .6; .31; .03; .02 G/100ML; G/100ML; G/100ML; G/100ML; G/100ML
INJECTION, SOLUTION INTRAVENOUS AS NEEDED
Status: DISCONTINUED | OUTPATIENT
Start: 2022-05-18 | End: 2022-05-19 | Stop reason: HOSPADM

## 2022-05-18 RX ORDER — IBUPROFEN 600 MG/1
600 TABLET ORAL EVERY 6 HOURS PRN
Status: DISCONTINUED | OUTPATIENT
Start: 2022-05-18 | End: 2022-05-19

## 2022-05-18 RX ORDER — ALBUTEROL SULFATE 90 UG/1
2 AEROSOL, METERED RESPIRATORY (INHALATION) EVERY 6 HOURS PRN
Status: DISCONTINUED | OUTPATIENT
Start: 2022-05-18 | End: 2022-05-19

## 2022-05-18 RX ORDER — SODIUM CHLORIDE, SODIUM LACTATE, POTASSIUM CHLORIDE, CALCIUM CHLORIDE 600; 310; 30; 20 MG/100ML; MG/100ML; MG/100ML; MG/100ML
INJECTION, SOLUTION INTRAVENOUS CONTINUOUS
Status: DISCONTINUED | OUTPATIENT
Start: 2022-05-18 | End: 2022-05-19 | Stop reason: HOSPADM

## 2022-05-18 RX ORDER — ACETAMINOPHEN 500 MG
500 TABLET ORAL EVERY 6 HOURS PRN
Status: DISCONTINUED | OUTPATIENT
Start: 2022-05-18 | End: 2022-05-19 | Stop reason: HOSPADM

## 2022-05-18 RX ORDER — HYDROMORPHONE HYDROCHLORIDE 1 MG/ML
1 INJECTION, SOLUTION INTRAMUSCULAR; INTRAVENOUS; SUBCUTANEOUS EVERY 2 HOUR PRN
Status: DISCONTINUED | OUTPATIENT
Start: 2022-05-18 | End: 2022-05-19

## 2022-05-18 RX ORDER — AMMONIA INHALANTS 0.04 G/.3ML
0.3 INHALANT RESPIRATORY (INHALATION) AS NEEDED
Status: DISCONTINUED | OUTPATIENT
Start: 2022-05-18 | End: 2022-05-19 | Stop reason: HOSPADM

## 2022-05-18 RX ORDER — TERBUTALINE SULFATE 1 MG/ML
0.25 INJECTION, SOLUTION SUBCUTANEOUS AS NEEDED
Status: DISCONTINUED | OUTPATIENT
Start: 2022-05-18 | End: 2022-05-19 | Stop reason: HOSPADM

## 2022-05-19 ENCOUNTER — ANESTHESIA EVENT (OUTPATIENT)
Dept: OBGYN UNIT | Facility: HOSPITAL | Age: 27
End: 2022-05-19
Payer: COMMERCIAL

## 2022-05-19 ENCOUNTER — ANESTHESIA (OUTPATIENT)
Dept: OBGYN UNIT | Facility: HOSPITAL | Age: 27
End: 2022-05-19
Payer: COMMERCIAL

## 2022-05-19 PROBLEM — O13.3 GESTATIONAL HYPERTENSION, THIRD TRIMESTER (HCC): Status: ACTIVE | Noted: 2022-05-19

## 2022-05-19 PROBLEM — O13.3 GESTATIONAL HYPERTENSION, THIRD TRIMESTER: Status: ACTIVE | Noted: 2022-05-19

## 2022-05-19 LAB — FETAL SCREEN RESULT: NEGATIVE

## 2022-05-19 PROCEDURE — 59400 OBSTETRICAL CARE: CPT | Performed by: OBSTETRICS & GYNECOLOGY

## 2022-05-19 PROCEDURE — 10907ZC DRAINAGE OF AMNIOTIC FLUID, THERAPEUTIC FROM PRODUCTS OF CONCEPTION, VIA NATURAL OR ARTIFICIAL OPENING: ICD-10-PCS | Performed by: OBSTETRICS & GYNECOLOGY

## 2022-05-19 PROCEDURE — 3E0334Z INTRODUCTION OF SERUM, TOXOID AND VACCINE INTO PERIPHERAL VEIN, PERCUTANEOUS APPROACH: ICD-10-PCS | Performed by: OBSTETRICS & GYNECOLOGY

## 2022-05-19 PROCEDURE — 3E033VJ INTRODUCTION OF OTHER HORMONE INTO PERIPHERAL VEIN, PERCUTANEOUS APPROACH: ICD-10-PCS | Performed by: OBSTETRICS & GYNECOLOGY

## 2022-05-19 RX ORDER — LIDOCAINE HYDROCHLORIDE AND EPINEPHRINE 15; 5 MG/ML; UG/ML
INJECTION, SOLUTION EPIDURAL AS NEEDED
Status: DISCONTINUED | OUTPATIENT
Start: 2022-05-19 | End: 2022-05-19 | Stop reason: SURG

## 2022-05-19 RX ORDER — BISACODYL 10 MG
10 SUPPOSITORY, RECTAL RECTAL ONCE AS NEEDED
Status: DISCONTINUED | OUTPATIENT
Start: 2022-05-19 | End: 2022-05-21

## 2022-05-19 RX ORDER — NALBUPHINE HCL 10 MG/ML
2.5 AMPUL (ML) INJECTION
Status: DISCONTINUED | OUTPATIENT
Start: 2022-05-19 | End: 2022-05-19

## 2022-05-19 RX ORDER — SIMETHICONE 80 MG
80 TABLET,CHEWABLE ORAL 3 TIMES DAILY PRN
Status: DISCONTINUED | OUTPATIENT
Start: 2022-05-19 | End: 2022-05-21

## 2022-05-19 RX ORDER — ENOXAPARIN SODIUM 100 MG/ML
40 INJECTION SUBCUTANEOUS DAILY
Status: DISCONTINUED | OUTPATIENT
Start: 2022-05-20 | End: 2022-05-21

## 2022-05-19 RX ORDER — CHOLECALCIFEROL (VITAMIN D3) 25 MCG
1 TABLET,CHEWABLE ORAL DAILY
Status: DISCONTINUED | OUTPATIENT
Start: 2022-05-19 | End: 2022-05-21

## 2022-05-19 RX ORDER — DOCUSATE SODIUM 100 MG/1
100 CAPSULE, LIQUID FILLED ORAL
Status: DISCONTINUED | OUTPATIENT
Start: 2022-05-19 | End: 2022-05-21

## 2022-05-19 RX ORDER — IBUPROFEN 600 MG/1
600 TABLET ORAL EVERY 6 HOURS
Status: DISCONTINUED | OUTPATIENT
Start: 2022-05-19 | End: 2022-05-21

## 2022-05-19 RX ORDER — ACETAMINOPHEN 500 MG
1000 TABLET ORAL EVERY 6 HOURS PRN
Status: DISCONTINUED | OUTPATIENT
Start: 2022-05-19 | End: 2022-05-21

## 2022-05-19 RX ORDER — ACETAMINOPHEN 500 MG
500 TABLET ORAL EVERY 6 HOURS PRN
Status: DISCONTINUED | OUTPATIENT
Start: 2022-05-19 | End: 2022-05-21

## 2022-05-19 RX ORDER — BUPIVACAINE HCL/0.9 % NACL/PF 0.25 %
5 PLASTIC BAG, INJECTION (ML) EPIDURAL AS NEEDED
Status: DISCONTINUED | OUTPATIENT
Start: 2022-05-19 | End: 2022-05-19

## 2022-05-19 RX ORDER — LIDOCAINE HYDROCHLORIDE 10 MG/ML
INJECTION, SOLUTION EPIDURAL; INFILTRATION; INTRACAUDAL; PERINEURAL AS NEEDED
Status: DISCONTINUED | OUTPATIENT
Start: 2022-05-19 | End: 2022-05-19 | Stop reason: SURG

## 2022-05-19 RX ORDER — CETIRIZINE HYDROCHLORIDE 10 MG/1
10 TABLET ORAL NIGHTLY
Status: DISCONTINUED | OUTPATIENT
Start: 2022-05-19 | End: 2022-05-21

## 2022-05-19 RX ADMIN — LIDOCAINE HYDROCHLORIDE 50 MG: 10 INJECTION, SOLUTION EPIDURAL; INFILTRATION; INTRACAUDAL; PERINEURAL at 10:38:00

## 2022-05-19 RX ADMIN — LIDOCAINE HYDROCHLORIDE AND EPINEPHRINE 3 ML: 15; 5 INJECTION, SOLUTION EPIDURAL at 10:49:00

## 2022-05-19 NOTE — L&D DELIVERY NOTE
Randi Pollen [[id number]]    Labor Events     labor?: No   steroids?: None  Cervical ripening date/time: 2022 2226  Cervical ripening type: Misoprostol  Antibiotics received during labor?: Yes  Antibiotics (enter # doses in comment): ampicillin  Rupture date/time: 2022 0950     Rupture type: AROM  Fluid color: Clear  Induction: Misoprostol, Oxytocin, AROM  Indications for induction: Hypertension  Induction comment: Gestational HTN   Augmentation: None     Labor Event Times    Dilation complete date/time: 2022 1627     [place] Presentation    No data filed     Operative Delivery    No data filed     Shoulder Dystocia    No data filed     Anesthesia    Method: Epidural          [place] Delivery    Head delivery date/time: 2022 16:39:08   Delivery date/time:  22 16:39:30   Delivery type: Normal spontaneous vaginal delivery    Details:     Delivery location: delivery room     Delivery Providers    Delivering Clinician: Vanessa Almaraz MD   Delivery personnel:  Provider Role   Boyd Jenkins RN Baby Nurse   Daniel Pedroza RN Delivery Nurse         Cord    Vessels: 3 Vessels  Complications: None  Timed cord clamping: Yes  Time in sec: 30  Cord blood disposition: to lab  Gases sent?: No     Resuscitation    Method: None      Measurements    Weight: 3940 g 8 lb 11 oz Length: 50.8 cm   Head circum.: 36.5 cm Chest circum.: 35.5 cm      Abdominal circum.: 32.5 cm       Placenta    Date/time: 2022 1644  Removal: Spontaneous  Appearance: Intact  Disposition: Pathology     Apgars    Living status: Living   Apgar Scoring Key:    0 1 2    Skin color Blue or pale Acrocyanotic Completely pink    Heart rate Absent <100 bpm >100 bpm    Reflex irritability No response Grimace Cry or active withdrawal    Muscle tone Limp Some flexion Active motion    Respiratory effort Absent Weak cry; hypoventilation Good, crying              1 Minute:  5 Minute:  10 Minute:  15 Minute:  20 Minute:    Skin color: 0  1       Heart rate: 2  2       Reflex irritablity: 2  2       Muscle tone: 2  2       Respiratory effort: 2  2       Total: 8  9          Apgars assigned by: TEJINDER [id number] Dorena RN   disposition: with mother     Skin to Skin    Skin to skin initiated date/time: 2022 1645  Skin to skin with: Mother     Vaginal Count    Initial count RN: Jennifer Cardoso RN  Initial count Tech: Clarkston Bears   Sponges   Sharps    Initial counts 11   0    Final counts 11   0    Final count RN: Jennifer Cardoso RN  Final count MD: Lily Palomino MD     Delivery (Maternal)    Episiotomy: None  Perineal lacerations: None    Vaginal laceration?: No    Cervical laceration?: No    Clitoral laceration?: No    Quantitative blood loss (mL): 317          Called by RN that patient was completely dilated with urge to push. Head was delivered over intact perineum with maternal effort in OA position. No nuchal cord noted. Anterior (left) shoulder was delivered with maternal effort and gentle downward traction. Posterior shoulder and body followed easily. Mouth and nose were bulb suctioned and baby was placed on maternal abdomen for drying/stimulation. Vigorous cry. Cord was clamped x2 after delay and cut by FOB. Cord blood collected. Placenta delivered complete with gentle cord traction. Uterus firm with massage, IV Pitocin given, minimal bleeding. Vagina/cervix examined and found to be intact. Counts were correct at the end of the delivery. QBL 317cc. Mother and baby Tami Wilcox" doing well, plan for transfer to Mother/Baby after initial recovery.      Manuel Kuhn MD  [facility]  2022 5:05 PM

## 2022-05-19 NOTE — PLAN OF CARE
Problem: BIRTH - VAGINAL/ SECTION  Goal: Fetal and maternal status remain reassuring during the birth process  Description: INTERVENTIONS:  - Monitor vital signs  - Monitor fetal heart rate  - Monitor uterine activity  - Monitor labor progression (vaginal delivery)  - DVT prophylaxis (C/S delivery)  - Surgical antibiotic prophylaxis (C/S delivery)  Outcome: Progressing     Problem: PAIN - ADULT  Goal: Verbalizes/displays adequate comfort level or patient's stated pain goal  Description: INTERVENTIONS:  - Encourage pt to monitor pain and request assistance  - Assess pain using appropriate pain scale  - Administer analgesics based on type and severity of pain and evaluate response  - Implement non-pharmacological measures as appropriate and evaluate response  - Consider cultural and social influences on pain and pain management  - Manage/alleviate anxiety  - Utilize distraction and/or relaxation techniques  - Monitor for opioid side effects  - Notify MD/LIP if interventions unsuccessful or patient reports new pain  - Anticipate increased pain with activity and pre-medicate as appropriate  Outcome: Progressing     Problem: ANXIETY  Goal: Will report anxiety at manageable levels  Description: INTERVENTIONS:  - Administer medication as ordered  - Teach and rehearse alternative coping skills  - Provide emotional support with 1:1 interaction with staff  Outcome: Progressing     Problem: Patient/Family Goals  Goal: Patient/Family Long Term Goal  Description: Patient's Long Term Goal: Safe delivery of infant    Interventions:    - See additional Care Plan goals for specific interventions  Outcome: Progressing  Goal: Patient/Family Short Term Goal  Description: Patient's Short Term Goal: adequate pain control    Interventions:     - See additional Care Plan goals for specific interventions  Outcome: Progressing     Problem: SAFETY ADULT - FALL  Goal: Free from fall injury  Description: INTERVENTIONS:  - Assess pt frequently for physical needs  - Identify cognitive and physical deficits and behaviors that affect risk of falls.   - [place] fall precautions as indicated by assessment.  - Educate pt/family on patient safety including physical limitations  - Instruct pt to call for assistance with activity based on assessment  - Modify environment to reduce risk of injury  - Provide assistive devices as appropriate  - Consider OT/PT consult to assist with strengthening/mobility  - Encourage toileting schedule  Outcome: Progressing

## 2022-05-19 NOTE — PLAN OF CARE
Problem: BIRTH - VAGINAL/ SECTION  Goal: Fetal and maternal status remain reassuring during the birth process  Description: INTERVENTIONS:  - Monitor vital signs  - Monitor fetal heart rate  - Monitor uterine activity  - Monitor labor progression (vaginal delivery)  - DVT prophylaxis (C/S delivery)  - Surgical antibiotic prophylaxis (C/S delivery)  Outcome: Completed     Problem: PAIN - ADULT  Goal: Verbalizes/displays adequate comfort level or patient's stated pain goal  Description: INTERVENTIONS:  - Encourage pt to monitor pain and request assistance  - Assess pain using appropriate pain scale  - Administer analgesics based on type and severity of pain and evaluate response  - Implement non-pharmacological measures as appropriate and evaluate response  - Consider cultural and social influences on pain and pain management  - Manage/alleviate anxiety  - Utilize distraction and/or relaxation techniques  - Monitor for opioid side effects  - Notify MD/LIP if interventions unsuccessful or patient reports new pain  - Anticipate increased pain with activity and pre-medicate as appropriate  Outcome: Completed     Problem: ANXIETY  Goal: Will report anxiety at manageable levels  Description: INTERVENTIONS:  - Administer medication as ordered  - Teach and rehearse alternative coping skills  - Provide emotional support with 1:1 interaction with staff  Outcome: Completed     Problem: Patient/Family Goals  Goal: Patient/Family Long Term Goal    - See additional Care Plan goals for specific interventions  Outcome: Completed  Goal: Patient/Family Short Term Goal    - See additional Care Plan goals for specific interventions  Outcome: Completed     Problem: SAFETY ADULT - FALL  Goal: Free from fall injury  Description: INTERVENTIONS:  - Assess pt frequently for physical needs  - Identify cognitive and physical deficits and behaviors that affect risk of falls. - [place] fall precautions as indicated by assessment.   - Educate pt/family on patient safety including physical limitations  - Instruct pt to call for assistance with activity based on assessment  - Modify environment to reduce risk of injury  - Provide assistive devices as appropriate  - Consider OT/PT consult to assist with strengthening/mobility  - Encourage toileting schedule  Outcome: Completed

## 2022-05-19 NOTE — ANESTHESIA PROCEDURE NOTES
Labor Analgesia  Performed by: Sulaiman Smith DO  Authorized by: Sulaiman Smith DO       General Information and Staff    Start Time:  5/19/2022 10:48 AM  End Time:  5/19/2022 11:48 AM  Anesthesiologist:  Sulaiman Smith DO  Performed by: Anesthesiologist  Patient Location:  OB  Site Identification: surface landmarks    Reason for Block: labor epidural    Preanesthetic Checklist: patient identified, IV checked, risks and benefits discussed, monitors and equipment checked, pre-op evaluation, timeout performed, IV bolus, anesthesia consent and sterile technique used      Procedure Details    Patient Position:  Sitting  Prep: ChloraPrep    Monitoring:  Heart rate and continuous pulse ox  Approach:  Midline    Epidural Needle    Injection Technique:   Needle Type: Tuohy  Needle Gauge:  17 G  Needle Length:  3.375 in  Needle Insertion Depth:  7.5  Location:  L3-4    Spinal Needle      Catheter    Catheter Type:  End hole  Catheter Size:  19 G  Catheter at Skin Depth:  12  Test Dose:  Negative    Assessment      Additional Comments     Patient in sitting position, assisted by RN; hands sterilized with alcohol gel, sterile gloves, mask, and hat worn, sterile prep (chloraprep) and drape applied, +KAM to air @7.5 cm, epidural catheter threaded easily, and secured to patients' back, patient denied any paraesthesias, no alba blood or CSF aspirated; negative test dose; epidural dosed slowly with diluted local anesthetic without significant hypotension; no immediate complications observed. Patient tolerated procedure well.

## 2022-05-19 NOTE — PROGRESS NOTES
Pt is a 32year old female admitted to 110/110-A, No chief complaint on file. Here for induction of labor for gestational hypertension     Pt is 37w3d intra-uterine pregnancy. Denies any leaking of fluid. Reports +fetal movement. History obtained, consents signed. Oriented to room, staff, and plan of care.

## 2022-05-20 LAB
HCT VFR BLD AUTO: 30.3 %
HGB BLD-MCNC: 10.2 G/DL

## 2022-05-20 NOTE — PROGRESS NOTES
Pt transferred to Mother Baby room [phone] in stable condition. Report given to [facility]. Infant transferred with mother in stable condition.  Spouse and belongings with pt

## 2022-05-20 NOTE — TELEPHONE ENCOUNTER
Forms completed and signed by provider.    Copy sent to scanning  Faxed to [employer]  Copy in [address]

## 2022-05-21 VITALS
SYSTOLIC BLOOD PRESSURE: 134 MMHG | BODY MASS INDEX: 43.4 KG/M2 | RESPIRATION RATE: 18 BRPM | TEMPERATURE: 98 F | WEIGHT: 293 LBS | HEART RATE: 73 BPM | OXYGEN SATURATION: 98 % | HEIGHT: 68.9 IN | DIASTOLIC BLOOD PRESSURE: 66 MMHG

## 2022-05-21 RX ORDER — IBUPROFEN 600 MG/1
600 TABLET ORAL EVERY 6 HOURS
Qty: 30 TABLET | Refills: 0 | Status: SHIPPED | OUTPATIENT
Start: 2022-05-21

## 2022-05-21 RX ORDER — PSEUDOEPHEDRINE HCL 30 MG
100 TABLET ORAL 2 TIMES DAILY
Qty: 60 CAPSULE | Refills: 0 | Status: SHIPPED | OUTPATIENT
Start: 2022-05-21 | End: 2022-05-25

## 2022-05-21 NOTE — PROGRESS NOTES
Instructions completed with no further questions. ID number on mom/baby tag checked for match. Discharged with baby in [facility].

## 2022-05-24 ENCOUNTER — TELEPHONE (OUTPATIENT)
Dept: OBGYN UNIT | Facility: HOSPITAL | Age: 27
End: 2022-05-24

## 2022-05-25 ENCOUNTER — LAB ENCOUNTER (OUTPATIENT)
Dept: LAB | Age: 27
End: 2022-05-25
Attending: OBSTETRICS & GYNECOLOGY
Payer: COMMERCIAL

## 2022-05-25 ENCOUNTER — POSTPARTUM (OUTPATIENT)
Dept: OBGYN CLINIC | Facility: CLINIC | Age: 27
End: 2022-05-25
Payer: COMMERCIAL

## 2022-05-25 ENCOUNTER — NURSE ONLY (OUTPATIENT)
Dept: OBGYN CLINIC | Facility: CLINIC | Age: 27
End: 2022-05-25
Payer: COMMERCIAL

## 2022-05-25 VITALS
BODY MASS INDEX: 43.4 KG/M2 | HEART RATE: 89 BPM | DIASTOLIC BLOOD PRESSURE: 88 MMHG | SYSTOLIC BLOOD PRESSURE: 126 MMHG | WEIGHT: 293 LBS | HEIGHT: 69 IN

## 2022-05-25 VITALS
SYSTOLIC BLOOD PRESSURE: 126 MMHG | WEIGHT: 293 LBS | HEIGHT: 69 IN | DIASTOLIC BLOOD PRESSURE: 88 MMHG | HEART RATE: 89 BPM | BODY MASS INDEX: 43.4 KG/M2

## 2022-05-25 DIAGNOSIS — O13.3 GESTATIONAL HYPERTENSION, THIRD TRIMESTER: ICD-10-CM

## 2022-05-25 DIAGNOSIS — O13.3 GESTATIONAL HYPERTENSION, THIRD TRIMESTER: Primary | ICD-10-CM

## 2022-05-25 LAB
ALBUMIN SERPL-MCNC: 2.8 G/DL (ref 3.4–5)
ALBUMIN/GLOB SERPL: 0.7 {RATIO} (ref 1–2)
ALP LIVER SERPL-CCNC: 106 U/L
ALT SERPL-CCNC: 31 U/L
ANION GAP SERPL CALC-SCNC: 5 MMOL/L (ref 0–18)
AST SERPL-CCNC: 20 U/L (ref 15–37)
BASOPHILS # BLD AUTO: 0.04 X10(3) UL (ref 0–0.2)
BASOPHILS NFR BLD AUTO: 0.3 %
BILIRUB SERPL-MCNC: 0.2 MG/DL (ref 0.1–2)
BUN BLD-MCNC: 11 MG/DL (ref 7–18)
CALCIUM BLD-MCNC: 8.8 MG/DL (ref 8.5–10.1)
CHLORIDE SERPL-SCNC: 108 MMOL/L (ref 98–112)
CO2 SERPL-SCNC: 25 MMOL/L (ref 21–32)
CREAT BLD-MCNC: 0.66 MG/DL
EOSINOPHIL # BLD AUTO: 0.19 X10(3) UL (ref 0–0.7)
EOSINOPHIL NFR BLD AUTO: 1.6 %
ERYTHROCYTE [DISTWIDTH] IN BLOOD BY AUTOMATED COUNT: 13.2 %
FASTING STATUS PATIENT QL REPORTED: YES
GLOBULIN PLAS-MCNC: 4.3 G/DL (ref 2.8–4.4)
GLUCOSE BLD-MCNC: 86 MG/DL (ref 70–99)
HCT VFR BLD AUTO: 33.8 %
HGB BLD-MCNC: 10.9 G/DL
IMM GRANULOCYTES # BLD AUTO: 0.11 X10(3) UL (ref 0–1)
IMM GRANULOCYTES NFR BLD: 0.9 %
LYMPHOCYTES # BLD AUTO: 2.04 X10(3) UL (ref 1–4)
LYMPHOCYTES NFR BLD AUTO: 17.5 %
MCH RBC QN AUTO: 29.8 PG (ref 26–34)
MCHC RBC AUTO-ENTMCNC: 32.2 G/DL (ref 31–37)
MCV RBC AUTO: 92.3 FL
MONOCYTES # BLD AUTO: 0.42 X10(3) UL (ref 0.1–1)
MONOCYTES NFR BLD AUTO: 3.6 %
NEUTROPHILS # BLD AUTO: 8.88 X10 (3) UL (ref 1.5–7.7)
NEUTROPHILS # BLD AUTO: 8.88 X10(3) UL (ref 1.5–7.7)
NEUTROPHILS NFR BLD AUTO: 76.1 %
OSMOLALITY SERPL CALC.SUM OF ELEC: 285 MOSM/KG (ref 275–295)
PLATELET # BLD AUTO: 263 10(3)UL (ref 150–450)
POTASSIUM SERPL-SCNC: 4 MMOL/L (ref 3.5–5.1)
PROT SERPL-MCNC: 7.1 G/DL (ref 6.4–8.2)
RBC # BLD AUTO: 3.66 X10(6)UL
SODIUM SERPL-SCNC: 138 MMOL/L (ref 136–145)
WBC # BLD AUTO: 11.7 X10(3) UL (ref 4–11)

## 2022-05-25 PROCEDURE — 3008F BODY MASS INDEX DOCD: CPT | Performed by: OBSTETRICS & GYNECOLOGY

## 2022-05-25 PROCEDURE — 3074F SYST BP LT 130 MM HG: CPT | Performed by: OBSTETRICS & GYNECOLOGY

## 2022-05-25 PROCEDURE — 3079F DIAST BP 80-89 MM HG: CPT | Performed by: OBSTETRICS & GYNECOLOGY

## 2022-05-25 PROCEDURE — 80053 COMPREHEN METABOLIC PANEL: CPT | Performed by: OBSTETRICS & GYNECOLOGY

## 2022-05-25 PROCEDURE — 85025 COMPLETE CBC W/AUTO DIFF WBC: CPT | Performed by: OBSTETRICS & GYNECOLOGY

## 2022-05-25 NOTE — PROGRESS NOTES
CBC and CMP reviewed. Overall reassuring. Continue blood pressure monitoring at home. Patient contacted and final results. Patient reports that prior to creatinine ratio was not collected at the lab. Patient reports that she is unable to return to the lab until tomorrow morning. Patient advised to report to the lab tomorrow morning to complete the last part of her evaluation. Patient reports overall doing well. Patient has precautions for preeclampsia. She will continue to monitor her blood pressures and symptoms at home. All questions and concerns were addressed. Continue follow-up as directed.

## 2022-05-25 NOTE — PATIENT INSTRUCTIONS
Please check blood pressure at home at least 2-3 times per day   Please record blood pressure values     Return to office/hospital if you see that your blood pressure is elevated. Elevated Blood Pressure is:    Systolic (top number) greater than or equal to 160    And/Or    Diastolic (bottom number) greater than or equal to 110       Return to office/hospital if you experience nausea, vomiting, headache, vision changes and RUQ/epigastric pain.

## 2022-05-26 ENCOUNTER — LAB ENCOUNTER (OUTPATIENT)
Dept: LAB | Age: 27
End: 2022-05-26
Attending: OBSTETRICS & GYNECOLOGY
Payer: COMMERCIAL

## 2022-05-26 DIAGNOSIS — O13.3 GESTATIONAL HYPERTENSION, THIRD TRIMESTER: ICD-10-CM

## 2022-05-26 LAB
CREAT UR-SCNC: 43.9 MG/DL
PROT UR-MCNC: 32.1 MG/DL
PROT/CREAT UR-RTO: 0.73

## 2022-05-26 PROCEDURE — 82570 ASSAY OF URINE CREATININE: CPT | Performed by: OBSTETRICS & GYNECOLOGY

## 2022-05-26 PROCEDURE — 84156 ASSAY OF PROTEIN URINE: CPT | Performed by: OBSTETRICS & GYNECOLOGY

## 2022-05-27 NOTE — PROGRESS NOTES
Left a detailed voicemail with instructions. Will call back later to assess signs and symptoms of pre eclampsia.

## 2022-05-31 ENCOUNTER — TELEPHONE (OUTPATIENT)
Dept: OBGYN CLINIC | Facility: CLINIC | Age: 27
End: 2022-05-31

## 2022-05-31 ENCOUNTER — POSTPARTUM (OUTPATIENT)
Dept: OBGYN CLINIC | Facility: CLINIC | Age: 27
End: 2022-05-31
Payer: COMMERCIAL

## 2022-05-31 VITALS
HEIGHT: 69 IN | WEIGHT: 293 LBS | BODY MASS INDEX: 43.4 KG/M2 | DIASTOLIC BLOOD PRESSURE: 82 MMHG | SYSTOLIC BLOOD PRESSURE: 124 MMHG | HEART RATE: 89 BPM

## 2022-05-31 DIAGNOSIS — O13.3 GESTATIONAL HYPERTENSION, THIRD TRIMESTER: Primary | ICD-10-CM

## 2022-05-31 PROCEDURE — 3074F SYST BP LT 130 MM HG: CPT | Performed by: OBSTETRICS & GYNECOLOGY

## 2022-05-31 PROCEDURE — 3079F DIAST BP 80-89 MM HG: CPT | Performed by: OBSTETRICS & GYNECOLOGY

## 2022-05-31 PROCEDURE — 99212 OFFICE O/P EST SF 10 MIN: CPT | Performed by: OBSTETRICS & GYNECOLOGY

## 2022-05-31 PROCEDURE — 3008F BODY MASS INDEX DOCD: CPT | Performed by: OBSTETRICS & GYNECOLOGY

## 2022-05-31 NOTE — TELEPHONE ENCOUNTER
Received Prior Auth in [facility] [address] [place] [address]  Approved [id number]  3 DAYS  5/18/22 - 5/21/22

## 2022-07-06 ENCOUNTER — POSTPARTUM (OUTPATIENT)
Dept: OBGYN CLINIC | Facility: CLINIC | Age: 27
End: 2022-07-06
Payer: COMMERCIAL

## 2022-07-06 VITALS
WEIGHT: 293 LBS | SYSTOLIC BLOOD PRESSURE: 128 MMHG | BODY MASS INDEX: 43.4 KG/M2 | HEART RATE: 76 BPM | DIASTOLIC BLOOD PRESSURE: 78 MMHG | HEIGHT: 69 IN

## 2022-07-06 DIAGNOSIS — Z30.09 ENCOUNTER FOR COUNSELING REGARDING CONTRACEPTION: ICD-10-CM

## 2022-07-06 PROBLEM — Z67.91 RH NEGATIVE STATUS DURING PREGNANCY: Status: RESOLVED | Noted: 2021-11-08 | Resolved: 2022-07-06

## 2022-07-06 PROBLEM — Z67.91 RH NEGATIVE STATUS DURING PREGNANCY (HCC): Status: RESOLVED | Noted: 2021-11-08 | Resolved: 2022-07-06

## 2022-07-06 PROBLEM — A49.1 GROUP B STREPTOCOCCAL INFECTION: Status: RESOLVED | Noted: 2022-05-12 | Resolved: 2022-07-06

## 2022-07-06 PROBLEM — O99.210 OBESITY IN PREGNANCY: Status: RESOLVED | Noted: 2021-11-06 | Resolved: 2022-07-06

## 2022-07-06 PROBLEM — Z34.90 PREGNANCY: Status: RESOLVED | Noted: 2022-05-18 | Resolved: 2022-07-06

## 2022-07-06 PROBLEM — O13.3 GESTATIONAL HYPERTENSION, THIRD TRIMESTER: Status: RESOLVED | Noted: 2022-05-19 | Resolved: 2022-07-06

## 2022-07-06 PROBLEM — O26.899 RH NEGATIVE STATUS DURING PREGNANCY: Status: RESOLVED | Noted: 2021-11-08 | Resolved: 2022-07-06

## 2022-07-06 PROBLEM — O13.3 GESTATIONAL HYPERTENSION, THIRD TRIMESTER (HCC): Status: RESOLVED | Noted: 2022-05-19 | Resolved: 2022-07-06

## 2022-07-06 PROBLEM — O99.210 OBESITY IN PREGNANCY (HCC): Status: RESOLVED | Noted: 2021-11-06 | Resolved: 2022-07-06

## 2022-07-06 PROBLEM — O26.899 RH NEGATIVE STATUS DURING PREGNANCY (HCC): Status: RESOLVED | Noted: 2021-11-08 | Resolved: 2022-07-06

## 2022-07-06 PROBLEM — Z34.90 PREGNANCY (HCC): Status: RESOLVED | Noted: 2022-05-18 | Resolved: 2022-07-06

## 2022-07-06 PROCEDURE — 3078F DIAST BP <80 MM HG: CPT | Performed by: OBSTETRICS & GYNECOLOGY

## 2022-07-06 PROCEDURE — 3008F BODY MASS INDEX DOCD: CPT | Performed by: OBSTETRICS & GYNECOLOGY

## 2022-07-06 PROCEDURE — 3074F SYST BP LT 130 MM HG: CPT | Performed by: OBSTETRICS & GYNECOLOGY

## 2022-08-09 ENCOUNTER — LAB ENCOUNTER (OUTPATIENT)
Dept: LAB | Age: 27
End: 2022-08-09
Attending: INTERNAL MEDICINE
Payer: COMMERCIAL

## 2022-08-09 ENCOUNTER — OFFICE VISIT (OUTPATIENT)
Dept: INTERNAL MEDICINE CLINIC | Facility: CLINIC | Age: 27
End: 2022-08-09
Payer: COMMERCIAL

## 2022-08-09 VITALS
HEIGHT: 69.5 IN | WEIGHT: 293 LBS | DIASTOLIC BLOOD PRESSURE: 72 MMHG | BODY MASS INDEX: 42.42 KG/M2 | RESPIRATION RATE: 12 BRPM | HEART RATE: 72 BPM | TEMPERATURE: 97 F | SYSTOLIC BLOOD PRESSURE: 128 MMHG

## 2022-08-09 DIAGNOSIS — Z00.00 PHYSICAL EXAM, ANNUAL: ICD-10-CM

## 2022-08-09 DIAGNOSIS — Z00.00 PHYSICAL EXAM, ANNUAL: Primary | ICD-10-CM

## 2022-08-09 LAB
ALBUMIN SERPL-MCNC: 3.6 G/DL (ref 3.4–5)
ALBUMIN/GLOB SERPL: 1 {RATIO} (ref 1–2)
ALP LIVER SERPL-CCNC: 85 U/L
ALT SERPL-CCNC: 16 U/L
ANION GAP SERPL CALC-SCNC: 5 MMOL/L (ref 0–18)
AST SERPL-CCNC: 12 U/L (ref 15–37)
BASOPHILS # BLD AUTO: 0.03 X10(3) UL (ref 0–0.2)
BASOPHILS NFR BLD AUTO: 0.4 %
BILIRUB SERPL-MCNC: 0.3 MG/DL (ref 0.1–2)
BUN BLD-MCNC: 12 MG/DL (ref 7–18)
CALCIUM BLD-MCNC: 8.7 MG/DL (ref 8.5–10.1)
CHLORIDE SERPL-SCNC: 109 MMOL/L (ref 98–112)
CHOLEST SERPL-MCNC: 182 MG/DL (ref ?–200)
CO2 SERPL-SCNC: 26 MMOL/L (ref 21–32)
CREAT BLD-MCNC: 0.79 MG/DL
EOSINOPHIL # BLD AUTO: 0.15 X10(3) UL (ref 0–0.7)
EOSINOPHIL NFR BLD AUTO: 1.8 %
ERYTHROCYTE [DISTWIDTH] IN BLOOD BY AUTOMATED COUNT: 13.7 %
EST. AVERAGE GLUCOSE BLD GHB EST-MCNC: 111 MG/DL (ref 68–126)
FASTING PATIENT LIPID ANSWER: YES
FASTING STATUS PATIENT QL REPORTED: YES
GFR SERPLBLD BASED ON 1.73 SQ M-ARVRAT: 104 ML/MIN/1.73M2 (ref 60–?)
GLOBULIN PLAS-MCNC: 3.6 G/DL (ref 2.8–4.4)
GLUCOSE BLD-MCNC: 102 MG/DL (ref 70–99)
HBA1C MFR BLD: 5.5 % (ref ?–5.7)
HCT VFR BLD AUTO: 39.7 %
HDLC SERPL-MCNC: 42 MG/DL (ref 40–59)
HGB BLD-MCNC: 12.3 G/DL
IMM GRANULOCYTES # BLD AUTO: 0.03 X10(3) UL (ref 0–1)
IMM GRANULOCYTES NFR BLD: 0.4 %
LDLC SERPL CALC-MCNC: 107 MG/DL (ref ?–100)
LYMPHOCYTES # BLD AUTO: 2.81 X10(3) UL (ref 1–4)
LYMPHOCYTES NFR BLD AUTO: 33 %
MCH RBC QN AUTO: 28 PG (ref 26–34)
MCHC RBC AUTO-ENTMCNC: 31 G/DL (ref 31–37)
MCV RBC AUTO: 90.2 FL
MONOCYTES # BLD AUTO: 0.59 X10(3) UL (ref 0.1–1)
MONOCYTES NFR BLD AUTO: 6.9 %
NEUTROPHILS # BLD AUTO: 4.91 X10 (3) UL (ref 1.5–7.7)
NEUTROPHILS # BLD AUTO: 4.91 X10(3) UL (ref 1.5–7.7)
NEUTROPHILS NFR BLD AUTO: 57.5 %
NONHDLC SERPL-MCNC: 140 MG/DL (ref ?–130)
OSMOLALITY SERPL CALC.SUM OF ELEC: 290 MOSM/KG (ref 275–295)
PLATELET # BLD AUTO: 259 10(3)UL (ref 150–450)
POTASSIUM SERPL-SCNC: 3.9 MMOL/L (ref 3.5–5.1)
PROT SERPL-MCNC: 7.2 G/DL (ref 6.4–8.2)
RBC # BLD AUTO: 4.4 X10(6)UL
SODIUM SERPL-SCNC: 140 MMOL/L (ref 136–145)
TRIGL SERPL-MCNC: 191 MG/DL (ref 30–149)
TSI SER-ACNC: 1.34 MIU/ML (ref 0.36–3.74)
VLDLC SERPL CALC-MCNC: 33 MG/DL (ref 0–30)
WBC # BLD AUTO: 8.5 X10(3) UL (ref 4–11)

## 2022-08-09 PROCEDURE — 83036 HEMOGLOBIN GLYCOSYLATED A1C: CPT | Performed by: INTERNAL MEDICINE

## 2022-08-09 PROCEDURE — 3078F DIAST BP <80 MM HG: CPT | Performed by: INTERNAL MEDICINE

## 2022-08-09 PROCEDURE — 80050 GENERAL HEALTH PANEL: CPT | Performed by: INTERNAL MEDICINE

## 2022-08-09 PROCEDURE — 3008F BODY MASS INDEX DOCD: CPT | Performed by: INTERNAL MEDICINE

## 2022-08-09 PROCEDURE — 99395 PREV VISIT EST AGE 18-39: CPT | Performed by: INTERNAL MEDICINE

## 2022-08-09 PROCEDURE — 80061 LIPID PANEL: CPT | Performed by: INTERNAL MEDICINE

## 2022-08-09 PROCEDURE — 3074F SYST BP LT 130 MM HG: CPT | Performed by: INTERNAL MEDICINE

## 2022-08-09 RX ORDER — VALACYCLOVIR HYDROCHLORIDE 500 MG/1
TABLET, FILM COATED ORAL AS NEEDED
COMMUNITY

## 2022-08-17 ENCOUNTER — TELEPHONE (OUTPATIENT)
Dept: OBGYN CLINIC | Facility: CLINIC | Age: 27
End: 2022-08-17

## 2022-08-17 RX ORDER — METRONIDAZOLE 500 MG/1
500 TABLET ORAL 2 TIMES DAILY
Qty: 14 TABLET | Refills: 0 | Status: SHIPPED | OUTPATIENT
Start: 2022-08-17 | End: 2022-08-24

## 2022-08-17 NOTE — TELEPHONE ENCOUNTER
Patient calling with c/o \"fishy\" vaginal odor with some thin watery discharge. s/s consistent with bacterial vaginosis, per protocol, Flagyl 500 mg po bid x7 days, #14 no refills sent to pharmacy on file. Allergies verified and pharmacy location verified before sending rx. Instructed patient to call with any new or worsening symptoms, or symptoms that persist beyond 1 week after her last dose of medication. Also instructed patient to avoid alcohol intake while taking medication and for 24 hours after last dose. Patient states understanding and has no further questions/concerns at this time.

## 2022-08-23 ENCOUNTER — TELEMEDICINE (OUTPATIENT)
Dept: INTERNAL MEDICINE CLINIC | Facility: CLINIC | Age: 27
End: 2022-08-23

## 2022-08-23 ENCOUNTER — OFFICE VISIT (OUTPATIENT)
Dept: PODIATRY CLINIC | Facility: CLINIC | Age: 27
End: 2022-08-23
Payer: COMMERCIAL

## 2022-08-23 VITALS — DIASTOLIC BLOOD PRESSURE: 82 MMHG | HEART RATE: 80 BPM | SYSTOLIC BLOOD PRESSURE: 110 MMHG

## 2022-08-23 DIAGNOSIS — M79.2 NEURALGIA OF LEFT FOOT: ICD-10-CM

## 2022-08-23 DIAGNOSIS — M79.672 LEFT FOOT PAIN: Primary | ICD-10-CM

## 2022-08-23 DIAGNOSIS — M19.072 ARTHROSIS OF MIDFOOT, LEFT: ICD-10-CM

## 2022-08-23 DIAGNOSIS — E66.01 CLASS 3 SEVERE OBESITY DUE TO EXCESS CALORIES WITHOUT SERIOUS COMORBIDITY WITH BODY MASS INDEX (BMI) OF 45.0 TO 49.9 IN ADULT (HCC): ICD-10-CM

## 2022-08-23 DIAGNOSIS — Z51.81 ENCOUNTER FOR THERAPEUTIC DRUG MONITORING: Primary | ICD-10-CM

## 2022-08-23 DIAGNOSIS — G57.92 NEURITIS OF FOOT, LEFT: ICD-10-CM

## 2022-08-23 DIAGNOSIS — E88.81 METABOLIC SYNDROME: ICD-10-CM

## 2022-08-23 PROCEDURE — 64450 NJX AA&/STRD OTHER PN/BRANCH: CPT | Performed by: PODIATRIST

## 2022-08-23 PROCEDURE — 3079F DIAST BP 80-89 MM HG: CPT | Performed by: PODIATRIST

## 2022-08-23 PROCEDURE — 3074F SYST BP LT 130 MM HG: CPT | Performed by: PODIATRIST

## 2022-08-23 PROCEDURE — 99214 OFFICE O/P EST MOD 30 MIN: CPT | Performed by: NURSE PRACTITIONER

## 2022-08-23 PROCEDURE — 99213 OFFICE O/P EST LOW 20 MIN: CPT | Performed by: PODIATRIST

## 2022-08-23 RX ORDER — TIRZEPATIDE 2.5 MG/.5ML
2.5 INJECTION, SOLUTION SUBCUTANEOUS WEEKLY
Qty: 2 ML | Refills: 0 | Status: SHIPPED | OUTPATIENT
Start: 2022-08-23

## 2022-08-23 RX ORDER — TRIAMCINOLONE ACETONIDE 40 MG/ML
20 INJECTION, SUSPENSION INTRA-ARTICULAR; INTRAMUSCULAR ONCE
Status: COMPLETED | OUTPATIENT
Start: 2022-08-23 | End: 2022-08-23

## 2022-08-23 RX ADMIN — TRIAMCINOLONE ACETONIDE 20 MG: 40 INJECTION, SUSPENSION INTRA-ARTICULAR; INTRAMUSCULAR at 15:44:00

## 2022-08-23 NOTE — PROGRESS NOTES
Per verbal order from Dr. Damir Granado 0.5mL of kenalog, & 0.5mL of 0.5% marcaine was drawn up and injected into the patient's left foot by the provider.

## 2022-08-23 NOTE — PROGRESS NOTES
Per verbal order from Dr. Vinicius Garcia 0.5mL of kenalog, & 0.5mL of 0.5% marcaine was drawn up and injected into the patient's left foot by the provider.      Rebecca Mujica RN

## 2022-08-30 ENCOUNTER — OFFICE VISIT (OUTPATIENT)
Dept: OBGYN CLINIC | Facility: CLINIC | Age: 27
End: 2022-08-30
Payer: COMMERCIAL

## 2022-08-30 VITALS
DIASTOLIC BLOOD PRESSURE: 70 MMHG | SYSTOLIC BLOOD PRESSURE: 120 MMHG | HEIGHT: 69 IN | BODY MASS INDEX: 43.4 KG/M2 | WEIGHT: 293 LBS

## 2022-08-30 DIAGNOSIS — Z30.430 ENCOUNTER FOR INSERTION OF INTRAUTERINE CONTRACEPTIVE DEVICE (IUD): Primary | ICD-10-CM

## 2022-08-30 DIAGNOSIS — Z01.818 PREPROCEDURAL EXAMINATION: ICD-10-CM

## 2022-08-30 PROBLEM — Z97.5 IUD (INTRAUTERINE DEVICE) IN PLACE: Status: ACTIVE | Noted: 2022-08-30

## 2022-08-30 LAB
CONTROL LINE PRESENT WITH A CLEAR BACKGROUND (YES/NO): YES YES/NO
PREGNANCY TEST, URINE: NEGATIVE

## 2022-08-30 PROCEDURE — 3078F DIAST BP <80 MM HG: CPT | Performed by: OBSTETRICS & GYNECOLOGY

## 2022-08-30 PROCEDURE — 3074F SYST BP LT 130 MM HG: CPT | Performed by: OBSTETRICS & GYNECOLOGY

## 2022-08-30 PROCEDURE — 3008F BODY MASS INDEX DOCD: CPT | Performed by: OBSTETRICS & GYNECOLOGY

## 2022-08-30 PROCEDURE — 81025 URINE PREGNANCY TEST: CPT | Performed by: OBSTETRICS & GYNECOLOGY

## 2022-08-30 PROCEDURE — 58300 INSERT INTRAUTERINE DEVICE: CPT | Performed by: OBSTETRICS & GYNECOLOGY

## 2022-08-30 NOTE — PROGRESS NOTES
[facility]  Obstetrics and Gynecology  Procedure Note      Ivana Alvarado is a 32year old [id number] who presents today for Mirena IUD insertion. Patient's last menstrual period was 09/05/2021 (exact date). Prior to the procedure, a urine HCG was performed and was negative. Consent form was signed, and the procedure was reviewed in detail. 08/30/22  1612   BP: 120/70        Procedure details:  Patient was taken to the procedure room. A sterile speculum was placed and the cervix was visualized and cleansed with betadine. An Allis clamp was placed on the anterior cervix. The uterus was sounded to 7 cm. The Mirena IUD was then placed without difficulty per 's instructions. The strings were trimmed to 2-3 cm. The clamp and speculum were removed. Post-procedure instructions were reviewed, including pelvic rest. She was advised to return in 6 weeks for an IUD string check.     Jamil Whalen MD  [facility]  8/30/2022 4:31 PM

## 2022-09-20 ENCOUNTER — OFFICE VISIT (OUTPATIENT)
Dept: PODIATRY CLINIC | Facility: CLINIC | Age: 27
End: 2022-09-20

## 2022-09-20 DIAGNOSIS — M79.672 LEFT FOOT PAIN: Primary | ICD-10-CM

## 2022-09-20 DIAGNOSIS — M19.072 ARTHROSIS OF MIDFOOT, LEFT: ICD-10-CM

## 2022-09-20 DIAGNOSIS — M25.579 ARTHRALGIA OF FOOT, UNSPECIFIED LATERALITY: ICD-10-CM

## 2022-09-20 PROCEDURE — 99213 OFFICE O/P EST LOW 20 MIN: CPT | Performed by: PODIATRIST

## 2022-09-26 DIAGNOSIS — Z51.81 ENCOUNTER FOR THERAPEUTIC DRUG MONITORING: ICD-10-CM

## 2022-09-26 DIAGNOSIS — E66.01 CLASS 3 SEVERE OBESITY DUE TO EXCESS CALORIES WITHOUT SERIOUS COMORBIDITY WITH BODY MASS INDEX (BMI) OF 45.0 TO 49.9 IN ADULT (HCC): ICD-10-CM

## 2022-09-26 DIAGNOSIS — E88.81 METABOLIC SYNDROME: ICD-10-CM

## 2022-09-26 NOTE — TELEPHONE ENCOUNTER
Requesting Mounjaro   LOV: 8/23/22  RTC: 6 weeks  Last Relevant Labs: 8/9/22  Filled: 8/23/22 #2ml with 0 refills  Mounjaro 2.5 mg     Future Appointments   Date Time Provider Sara Woodard   10/3/2022  2:00 PM YUNIOR Larios [id number]

## 2022-09-27 ENCOUNTER — TELEMEDICINE (OUTPATIENT)
Dept: INTERNAL MEDICINE CLINIC | Facility: CLINIC | Age: 27
End: 2022-09-27

## 2022-09-27 ENCOUNTER — TELEPHONE (OUTPATIENT)
Dept: INTERNAL MEDICINE CLINIC | Facility: CLINIC | Age: 27
End: 2022-09-27

## 2022-09-27 DIAGNOSIS — J01.10 ACUTE NON-RECURRENT FRONTAL SINUSITIS: Primary | ICD-10-CM

## 2022-09-27 PROCEDURE — 99213 OFFICE O/P EST LOW 20 MIN: CPT | Performed by: NURSE PRACTITIONER

## 2022-09-27 RX ORDER — TIRZEPATIDE 2.5 MG/.5ML
INJECTION, SOLUTION SUBCUTANEOUS
Qty: 2 ML | Refills: 0 | OUTPATIENT
Start: 2022-09-27

## 2022-09-27 RX ORDER — TIRZEPATIDE 5 MG/.5ML
5 INJECTION, SOLUTION SUBCUTANEOUS WEEKLY
Qty: 2 ML | Refills: 0 | Status: SHIPPED | OUTPATIENT
Start: 2022-09-27 | End: 2022-10-03

## 2022-09-27 RX ORDER — ACETAMINOPHEN 500 MG
500 TABLET ORAL EVERY 6 HOURS PRN
COMMUNITY

## 2022-09-27 RX ORDER — AMOXICILLIN AND CLAVULANATE POTASSIUM 875; 125 MG/1; MG/1
1 TABLET, FILM COATED ORAL 2 TIMES DAILY
Qty: 20 TABLET | Refills: 0 | Status: SHIPPED | OUTPATIENT
Start: 2022-09-27 | End: 2022-10-07

## 2022-09-27 RX ORDER — PSEUDOEPHEDRINE HYDROCHLORIDE 30 MG/1
30 TABLET ORAL EVERY 4 HOURS PRN
COMMUNITY

## 2022-09-27 NOTE — TELEPHONE ENCOUNTER
Patient has sinus pressure/headache. Please call [phone]    Virtual/Telephone Consent    Araseli Ness verbally consents to a Virtual/Telephone Check-In service on 9/27/22. Patient understands and accepts financial responsibility for any deductible, co-insurance and/or co-pays associated with this service.

## 2022-09-27 NOTE — PATIENT INSTRUCTIONS
Take antibiotic completely as ordered. Take antibiotic with food. Eat yogurt twice a day while on antibiotic or take an oral probiotic. Monitor for diarrhea, side effects, allergy. Follow up in one week as needed or when routine care is due.

## 2022-09-27 NOTE — TELEPHONE ENCOUNTER
Approved increased dose of Mounjaro, yes she can do a VV for next appt and then keep her in clinic for the next one. She can schedule the in clinic visit 2 months out from her VV.  Thanks

## 2022-09-29 ENCOUNTER — TELEPHONE (OUTPATIENT)
Dept: INTERNAL MEDICINE CLINIC | Facility: CLINIC | Age: 27
End: 2022-09-29

## 2022-09-29 NOTE — TELEPHONE ENCOUNTER
PA requested for Mounjaro 5 mg from [employer]  Patient is not diabetic needs to use coupon  My chart sent.

## 2022-10-03 ENCOUNTER — TELEMEDICINE (OUTPATIENT)
Dept: INTERNAL MEDICINE CLINIC | Facility: CLINIC | Age: 27
End: 2022-10-03

## 2022-10-03 DIAGNOSIS — E88.81 INSULIN RESISTANCE: ICD-10-CM

## 2022-10-03 DIAGNOSIS — E66.01 CLASS 3 SEVERE OBESITY DUE TO EXCESS CALORIES WITHOUT SERIOUS COMORBIDITY WITH BODY MASS INDEX (BMI) OF 45.0 TO 49.9 IN ADULT (HCC): ICD-10-CM

## 2022-10-03 DIAGNOSIS — Z51.81 ENCOUNTER FOR THERAPEUTIC DRUG MONITORING: Primary | ICD-10-CM

## 2022-10-03 DIAGNOSIS — E88.81 METABOLIC SYNDROME: ICD-10-CM

## 2022-10-03 RX ORDER — TIRZEPATIDE 2.5 MG/.5ML
2.5 INJECTION, SOLUTION SUBCUTANEOUS WEEKLY
Qty: 2 ML | Refills: 0 | Status: SHIPPED | OUTPATIENT
Start: 2022-10-03

## 2022-10-04 ENCOUNTER — LAB ENCOUNTER (OUTPATIENT)
Dept: LAB | Facility: HOSPITAL | Age: 27
End: 2022-10-04
Attending: NURSE PRACTITIONER
Payer: COMMERCIAL

## 2022-10-04 DIAGNOSIS — E88.81 METABOLIC SYNDROME: ICD-10-CM

## 2022-10-04 DIAGNOSIS — E66.01 CLASS 3 SEVERE OBESITY DUE TO EXCESS CALORIES WITHOUT SERIOUS COMORBIDITY WITH BODY MASS INDEX (BMI) OF 45.0 TO 49.9 IN ADULT (HCC): ICD-10-CM

## 2022-10-04 DIAGNOSIS — Z51.81 ENCOUNTER FOR THERAPEUTIC DRUG MONITORING: ICD-10-CM

## 2022-10-04 LAB
INSULIN SERPL-ACNC: 13.1 MU/L (ref 3–25)
VIT B12 SERPL-MCNC: 461 PG/ML (ref 193–986)
VIT D+METAB SERPL-MCNC: 23 NG/ML (ref 30–100)

## 2022-10-04 PROCEDURE — 36415 COLL VENOUS BLD VENIPUNCTURE: CPT

## 2022-10-04 PROCEDURE — 82607 VITAMIN B-12: CPT

## 2022-10-04 PROCEDURE — 82306 VITAMIN D 25 HYDROXY: CPT

## 2022-10-04 PROCEDURE — 83525 ASSAY OF INSULIN: CPT

## 2022-10-11 ENCOUNTER — OFFICE VISIT (OUTPATIENT)
Dept: OBGYN CLINIC | Facility: CLINIC | Age: 27
End: 2022-10-11
Payer: COMMERCIAL

## 2022-10-11 VITALS
WEIGHT: 293 LBS | DIASTOLIC BLOOD PRESSURE: 80 MMHG | HEIGHT: 69 IN | BODY MASS INDEX: 43.4 KG/M2 | SYSTOLIC BLOOD PRESSURE: 130 MMHG

## 2022-10-11 DIAGNOSIS — Z80.3 FAMILY HISTORY OF BREAST CANCER: ICD-10-CM

## 2022-10-11 DIAGNOSIS — Z12.4 SCREENING FOR CERVICAL CANCER: ICD-10-CM

## 2022-10-11 DIAGNOSIS — Z01.419 ENCOUNTER FOR WELL WOMAN EXAM WITH ROUTINE GYNECOLOGICAL EXAM: Primary | ICD-10-CM

## 2022-10-11 PROCEDURE — 3079F DIAST BP 80-89 MM HG: CPT | Performed by: OBSTETRICS & GYNECOLOGY

## 2022-10-11 PROCEDURE — 99395 PREV VISIT EST AGE 18-39: CPT | Performed by: OBSTETRICS & GYNECOLOGY

## 2022-10-11 PROCEDURE — 3008F BODY MASS INDEX DOCD: CPT | Performed by: OBSTETRICS & GYNECOLOGY

## 2022-10-11 PROCEDURE — 3075F SYST BP GE 130 - 139MM HG: CPT | Performed by: OBSTETRICS & GYNECOLOGY

## 2022-11-14 ENCOUNTER — TELEMEDICINE (OUTPATIENT)
Dept: INTERNAL MEDICINE CLINIC | Facility: CLINIC | Age: 27
End: 2022-11-14

## 2022-11-14 DIAGNOSIS — R05.1 ACUTE COUGH: ICD-10-CM

## 2022-11-14 DIAGNOSIS — J02.9 PHARYNGITIS, UNSPECIFIED ETIOLOGY: Primary | ICD-10-CM

## 2022-11-14 RX ORDER — ALBUTEROL SULFATE 90 UG/1
2 AEROSOL, METERED RESPIRATORY (INHALATION) EVERY 6 HOURS PRN
Qty: 1 EACH | Refills: 0 | Status: SHIPPED | OUTPATIENT
Start: 2022-11-14

## 2022-11-14 RX ORDER — BENZONATATE 200 MG/1
200 CAPSULE ORAL 2 TIMES DAILY PRN
Qty: 30 CAPSULE | Refills: 0 | Status: SHIPPED | OUTPATIENT
Start: 2022-11-14

## 2022-11-14 RX ORDER — AZITHROMYCIN 250 MG/1
TABLET, FILM COATED ORAL
Qty: 6 TABLET | Refills: 0 | Status: SHIPPED | OUTPATIENT
Start: 2022-11-14 | End: 2022-11-19

## 2022-11-14 RX ORDER — METHYLPREDNISOLONE 4 MG/1
TABLET ORAL
Qty: 1 EACH | Refills: 0 | Status: SHIPPED | OUTPATIENT
Start: 2022-11-14

## 2022-11-14 RX ORDER — CODEINE PHOSPHATE AND GUAIFENESIN 10; 100 MG/5ML; MG/5ML
SOLUTION ORAL NIGHTLY PRN
Qty: 180 ML | Refills: 0 | Status: SHIPPED | OUTPATIENT
Start: 2022-11-14

## 2022-11-18 ENCOUNTER — TELEPHONE (OUTPATIENT)
Dept: INTERNAL MEDICINE CLINIC | Facility: CLINIC | Age: 27
End: 2022-11-18

## 2022-11-18 DIAGNOSIS — R05.1 ACUTE COUGH: Primary | ICD-10-CM

## 2022-11-18 RX ORDER — HYDROCODONE BITARTRATE AND HOMATROPINE METHYLBROMIDE ORAL SOLUTION 5; 1.5 MG/5ML; MG/5ML
5 LIQUID ORAL NIGHTLY PRN
Qty: 120 ML | Refills: 0 | Status: SHIPPED | OUTPATIENT
Start: 2022-11-18

## 2022-11-18 RX ORDER — FLUTICASONE PROPIONATE AND SALMETEROL 250; 50 UG/1; UG/1
1 POWDER RESPIRATORY (INHALATION) EVERY 12 HOURS SCHEDULED
Qty: 1 EACH | Refills: 0 | Status: SHIPPED | OUTPATIENT
Start: 2022-11-18

## 2022-11-18 NOTE — TELEPHONE ENCOUNTER
Spoke with pt. She has a bronchitic sounding cough and coughed frequently during the conversation. Finishing medrol dose pack tomorrow. Has albuterol already. Guaifenesin with codeine is on back order. Trial hydromet. Sent to pharmacy. Advised only take at night. Will make her dizzy and drowsy. May need a prior auth and that may have to wait till Monday. Also sent in rx for advair 250-50 1 puff bid. Pt verbalized understanding.

## 2022-11-18 NOTE — TELEPHONE ENCOUNTER
pe rdr rylee ov note 11/14/22:  Return if symptoms worsen or fail to improve. in the next 2-3 days. Pt stated she works for [employer] has [employer] and the [employer] rx [facility] locations have been on back order everywhere the last week so she hasnt been able to get the rx  No other s/s are worsening   Is there another rx you recommend she try or would you like her to do another vv to discuss?

## 2022-11-18 NOTE — TELEPHONE ENCOUNTER
Patient had a VV with Dr Raisa Perez on 11/14/22. Her cough has gotten worse since then. Her pharmacy has been out of guaiFENesin-codeine (GUAIFENESIN AC) 100-10 MG/5ML Oral Solution. She has only been taking benzonatate 200 MG Oral Cap which has not been helping. Please advise. Thank you!

## 2022-12-28 ENCOUNTER — TELEPHONE (OUTPATIENT)
Dept: INTERNAL MEDICINE CLINIC | Facility: CLINIC | Age: 27
End: 2022-12-28

## 2022-12-28 NOTE — TELEPHONE ENCOUNTER
Patient believes she has a sinus infection. She had COVID last November. Please call [phone]    Virtual/Telephone Consent    Media Danna verbally consents to a Virtual/Telephone Check-In service on 12/29/22. Patient understands and accepts financial responsibility for any deductible, co-insurance and/or co-pays associated with this service.

## 2022-12-29 ENCOUNTER — TELEMEDICINE (OUTPATIENT)
Dept: INTERNAL MEDICINE CLINIC | Facility: CLINIC | Age: 27
End: 2022-12-29
Payer: COMMERCIAL

## 2022-12-29 DIAGNOSIS — U07.1 COVID-19 VIRUS INFECTION: ICD-10-CM

## 2022-12-29 DIAGNOSIS — J01.40 ACUTE NON-RECURRENT PANSINUSITIS: Primary | ICD-10-CM

## 2022-12-29 PROCEDURE — 99213 OFFICE O/P EST LOW 20 MIN: CPT | Performed by: NURSE PRACTITIONER

## 2022-12-29 RX ORDER — FLUTICASONE PROPIONATE 50 MCG
2 SPRAY, SUSPENSION (ML) NASAL DAILY
Qty: 16 G | Refills: 0 | Status: SHIPPED | OUTPATIENT
Start: 2022-12-29

## 2022-12-29 RX ORDER — AMOXICILLIN AND CLAVULANATE POTASSIUM 875; 125 MG/1; MG/1
1 TABLET, FILM COATED ORAL 2 TIMES DAILY
Qty: 20 TABLET | Refills: 0 | Status: SHIPPED | OUTPATIENT
Start: 2022-12-29 | End: 2023-01-08

## 2022-12-29 NOTE — PATIENT INSTRUCTIONS
Take antibiotic completely as ordered. Take antibiotic with food. Eat yogurt twice a day while on antibiotic or take an oral probiotic. Monitor for diarrhea, side effects, allergy. Take Flonase as needed for nasal congestion. Do warm, salt water gargles 2-3 times daily. You can use Robitussin DM or Mucinex DM as directed on the bottle for cough and chest congestion. Use Cepacol for sore throat and dry cough as needed. Use Tylenol as needed for pain or fever. Stay hydrated. Go to ER or call 911 if worsening symptoms such as persistent fever >103, difficulty breathing, or chest pain. Return to clinic in one week as needed or when routine care is due.     Infection prevention:  - Proper hand hygiene is very important          - Wear a mask in public  - Avoid touching your mouth, nose, eyes, and face  - Practice social distancing and staying 6 ft away from other individuals  - Cough into your arm or a tissue  - Avoid contact with others if you have symptoms of an upper or lower respiratory infection  - Avoid contact with others who are ill  - Avoid direct contact with your pets if you are ill  - Disinfect surfaces with appropriate materials  - If your symptoms become severe (shortness of breath with rest or activity, fever, chest congestion, productive cough), go to the ER  - Recommend self-isolation at home if you are sick, wearing a mask if in room with other family members

## 2023-01-03 ENCOUNTER — OFFICE VISIT (OUTPATIENT)
Dept: INTERNAL MEDICINE CLINIC | Facility: CLINIC | Age: 28
End: 2023-01-03
Payer: COMMERCIAL

## 2023-01-03 VITALS
SYSTOLIC BLOOD PRESSURE: 138 MMHG | HEIGHT: 69 IN | HEART RATE: 83 BPM | OXYGEN SATURATION: 99 % | BODY MASS INDEX: 43.4 KG/M2 | WEIGHT: 293 LBS | RESPIRATION RATE: 20 BRPM | TEMPERATURE: 98 F | DIASTOLIC BLOOD PRESSURE: 88 MMHG

## 2023-01-03 DIAGNOSIS — H69.82 DYSFUNCTION OF LEFT EUSTACHIAN TUBE: Primary | ICD-10-CM

## 2023-01-03 DIAGNOSIS — J01.40 ACUTE NON-RECURRENT PANSINUSITIS: ICD-10-CM

## 2023-01-03 PROCEDURE — 3079F DIAST BP 80-89 MM HG: CPT | Performed by: NURSE PRACTITIONER

## 2023-01-03 PROCEDURE — 99213 OFFICE O/P EST LOW 20 MIN: CPT | Performed by: NURSE PRACTITIONER

## 2023-01-03 PROCEDURE — 3075F SYST BP GE 130 - 139MM HG: CPT | Performed by: NURSE PRACTITIONER

## 2023-01-03 PROCEDURE — 3008F BODY MASS INDEX DOCD: CPT | Performed by: NURSE PRACTITIONER

## 2023-01-03 NOTE — PATIENT INSTRUCTIONS
Eustachian tube exercises:    1. Yawn often. 2. Chewing motion with jaw 5-10 times per hour. 3. Blow up a balloon. 4. Plug your nose, close your mouth and blow out gently trying to \"pop\" your ears.

## 2023-01-06 ENCOUNTER — PATIENT MESSAGE (OUTPATIENT)
Dept: INTERNAL MEDICINE CLINIC | Facility: CLINIC | Age: 28
End: 2023-01-06

## 2023-01-06 DIAGNOSIS — J01.40 ACUTE NON-RECURRENT PANSINUSITIS: ICD-10-CM

## 2023-01-06 DIAGNOSIS — R05.1 ACUTE COUGH: ICD-10-CM

## 2023-01-06 RX ORDER — ALBUTEROL SULFATE 90 UG/1
2 AEROSOL, METERED RESPIRATORY (INHALATION) EVERY 6 HOURS PRN
Qty: 1 EACH | Refills: 0 | Status: SHIPPED | OUTPATIENT
Start: 2023-01-06

## 2023-01-06 RX ORDER — FLUTICASONE PROPIONATE 50 MCG
2 SPRAY, SUSPENSION (ML) NASAL DAILY
Qty: 16 G | Refills: 0 | OUTPATIENT
Start: 2023-01-06

## 2023-01-06 NOTE — TELEPHONE ENCOUNTER
From: Amanda Torres  To: YUNIOR Russell  Sent: 1/6/2023 12:34 PM CST  Subject: Refill request     Sorry I didn't mean to refill the flonase that 1 refill request was in error. Sorry!    Rosa Parekh

## 2023-01-06 NOTE — TELEPHONE ENCOUNTER
Refill  Last refill on 12/29/2022. [facility] sent to patient asking if this script was received from the pharmacy.

## 2023-01-12 ENCOUNTER — OFFICE VISIT (OUTPATIENT)
Dept: INTERNAL MEDICINE CLINIC | Facility: CLINIC | Age: 28
End: 2023-01-12
Payer: COMMERCIAL

## 2023-01-12 ENCOUNTER — TELEPHONE (OUTPATIENT)
Dept: INTERNAL MEDICINE CLINIC | Facility: CLINIC | Age: 28
End: 2023-01-12

## 2023-01-12 VITALS
WEIGHT: 293 LBS | RESPIRATION RATE: 16 BRPM | SYSTOLIC BLOOD PRESSURE: 136 MMHG | HEIGHT: 69 IN | HEART RATE: 90 BPM | DIASTOLIC BLOOD PRESSURE: 74 MMHG | TEMPERATURE: 98 F | OXYGEN SATURATION: 97 % | BODY MASS INDEX: 43.4 KG/M2

## 2023-01-12 DIAGNOSIS — U07.1 COVID-19: ICD-10-CM

## 2023-01-12 DIAGNOSIS — H66.002 NON-RECURRENT ACUTE SUPPURATIVE OTITIS MEDIA OF LEFT EAR WITHOUT SPONTANEOUS RUPTURE OF TYMPANIC MEMBRANE: ICD-10-CM

## 2023-01-12 DIAGNOSIS — J40 BRONCHITIS: Primary | ICD-10-CM

## 2023-01-12 PROCEDURE — 3075F SYST BP GE 130 - 139MM HG: CPT | Performed by: NURSE PRACTITIONER

## 2023-01-12 PROCEDURE — 3078F DIAST BP <80 MM HG: CPT | Performed by: NURSE PRACTITIONER

## 2023-01-12 PROCEDURE — 99214 OFFICE O/P EST MOD 30 MIN: CPT | Performed by: NURSE PRACTITIONER

## 2023-01-12 PROCEDURE — 3008F BODY MASS INDEX DOCD: CPT | Performed by: NURSE PRACTITIONER

## 2023-01-12 RX ORDER — AZITHROMYCIN 250 MG/1
TABLET, FILM COATED ORAL
Qty: 6 TABLET | Refills: 0 | Status: SHIPPED | OUTPATIENT
Start: 2023-01-12 | End: 2023-01-17

## 2023-01-12 RX ORDER — BENZONATATE 200 MG/1
200 CAPSULE ORAL 3 TIMES DAILY PRN
Qty: 30 CAPSULE | Refills: 0 | Status: SHIPPED | OUTPATIENT
Start: 2023-01-12

## 2023-01-12 RX ORDER — DIPHENHYDRAMINE HCL 25 MG
30 CAPSULE ORAL AS NEEDED
COMMUNITY

## 2023-01-12 RX ORDER — PREDNISONE 20 MG/1
40 TABLET ORAL DAILY
Qty: 14 TABLET | Refills: 0 | Status: SHIPPED | OUTPATIENT
Start: 2023-01-12 | End: 2023-01-19

## 2023-01-12 NOTE — PATIENT INSTRUCTIONS
Take antibiotic completely as ordered     Take antibiotic with food    Eat yogurt twice a day while on antibiotic or take an oral probiotic    Monitor for diarrhea, side effects, allergic reaction    If you have a mild allergic reaction take benadryl and call the office. If it is severe and you have lip or throat swelling or trouble breathing go to the ER or call 911    Take the oral steroid as directed with food in the morning. Do not lay down for 1 hour after taking it. Avoid ibuprofen while taking it. Monitor for effectiveness, side effects, allergy. Go to the ER for any emergent symptoms. If you cannot get there safely call 911.      Follow-up in 7 to 10 days or sooner as needed

## 2023-01-12 NOTE — TELEPHONE ENCOUNTER
Pt would like to know if Michela Salinas would like her to stop over the counter and just take what was prescribed or continue all meds.  Pt stated response can be given by phone or mychart

## 2023-01-23 ENCOUNTER — OFFICE VISIT (OUTPATIENT)
Dept: INTERNAL MEDICINE CLINIC | Facility: CLINIC | Age: 28
End: 2023-01-23
Payer: COMMERCIAL

## 2023-01-23 VITALS
RESPIRATION RATE: 16 BRPM | BODY MASS INDEX: 43.4 KG/M2 | SYSTOLIC BLOOD PRESSURE: 124 MMHG | HEIGHT: 69 IN | HEART RATE: 81 BPM | DIASTOLIC BLOOD PRESSURE: 78 MMHG | OXYGEN SATURATION: 98 % | WEIGHT: 293 LBS | TEMPERATURE: 97 F

## 2023-01-23 DIAGNOSIS — E66.01 CLASS 3 SEVERE OBESITY DUE TO EXCESS CALORIES WITHOUT SERIOUS COMORBIDITY WITH BODY MASS INDEX (BMI) OF 45.0 TO 49.9 IN ADULT (HCC): ICD-10-CM

## 2023-01-23 DIAGNOSIS — H66.002 NON-RECURRENT ACUTE SUPPURATIVE OTITIS MEDIA OF LEFT EAR WITHOUT SPONTANEOUS RUPTURE OF TYMPANIC MEMBRANE: ICD-10-CM

## 2023-01-23 DIAGNOSIS — J40 BRONCHITIS: Primary | ICD-10-CM

## 2023-01-23 PROCEDURE — 3074F SYST BP LT 130 MM HG: CPT | Performed by: NURSE PRACTITIONER

## 2023-01-23 PROCEDURE — 3078F DIAST BP <80 MM HG: CPT | Performed by: NURSE PRACTITIONER

## 2023-01-23 PROCEDURE — 3008F BODY MASS INDEX DOCD: CPT | Performed by: NURSE PRACTITIONER

## 2023-01-23 PROCEDURE — 99214 OFFICE O/P EST MOD 30 MIN: CPT | Performed by: NURSE PRACTITIONER

## 2023-01-24 NOTE — PATIENT INSTRUCTIONS
Use my fitness pal for nutrition    Start exercising gradually    Do healthy diet with lots of vegetables and lean meats like chicken, fish, turkey    Increase your water intake    Follow-up in 3 months for a weight check or sooner as needed

## 2023-01-27 ENCOUNTER — TELEPHONE (OUTPATIENT)
Dept: NEUROLOGY | Facility: CLINIC | Age: 28
End: 2023-01-27

## 2023-01-27 ENCOUNTER — OFFICE VISIT (OUTPATIENT)
Dept: NEUROLOGY | Facility: CLINIC | Age: 28
End: 2023-01-27
Payer: COMMERCIAL

## 2023-01-27 VITALS
WEIGHT: 293 LBS | RESPIRATION RATE: 16 BRPM | SYSTOLIC BLOOD PRESSURE: 132 MMHG | HEIGHT: 69 IN | BODY MASS INDEX: 43.4 KG/M2 | HEART RATE: 90 BPM | DIASTOLIC BLOOD PRESSURE: 90 MMHG

## 2023-01-27 DIAGNOSIS — G43.109 MIGRAINE WITH AURA AND WITHOUT STATUS MIGRAINOSUS, NOT INTRACTABLE: Primary | ICD-10-CM

## 2023-01-27 PROCEDURE — 3075F SYST BP GE 130 - 139MM HG: CPT | Performed by: OTHER

## 2023-01-27 PROCEDURE — 3008F BODY MASS INDEX DOCD: CPT | Performed by: OTHER

## 2023-01-27 PROCEDURE — 3080F DIAST BP >= 90 MM HG: CPT | Performed by: OTHER

## 2023-01-27 PROCEDURE — 99214 OFFICE O/P EST MOD 30 MIN: CPT | Performed by: OTHER

## 2023-01-27 RX ORDER — ZONISAMIDE 25 MG/1
25 CAPSULE ORAL DAILY
Qty: 120 CAPSULE | Refills: 2 | Status: SHIPPED | OUTPATIENT
Start: 2023-01-27

## 2023-01-27 RX ORDER — RIZATRIPTAN BENZOATE 10 MG/1
TABLET ORAL
Qty: 12 TABLET | Refills: 2 | Status: SHIPPED | OUTPATIENT
Start: 2023-01-27

## 2023-01-27 NOTE — TELEPHONE ENCOUNTER
RN received a call from the pharmacist Rohit Gifford to clarify the titration dosing of the zonisamide 25mg.  RN gave a verbal to the pharmacist.    start at 25 mg nightly x1 week, then increase to 50 mg nightly x1 week, then 25 mg AM / 50 mg PM x1 week, then 50 mg bid

## 2023-03-02 ENCOUNTER — TELEPHONE (OUTPATIENT)
Dept: INTERNAL MEDICINE CLINIC | Facility: CLINIC | Age: 28
End: 2023-03-02

## 2023-03-02 NOTE — TELEPHONE ENCOUNTER
It depends. The guideline is 65+ and less people have a comorbid condition that would require it including smoking, diabetes, heart issues, asthma. I see asthma in her medical history but it is not on her current problem list.  Is this a remote history from childhood that she has outgrown or is her asthma still bothering her. Her most recent bronchitis after having a cold is not necessarily asthma. If she is having triggers of wheezing tightness shortness of breath and has not had any cold symptoms then this would be consistent with an asthma diagnosis    If she does have the symptoms she should get Prevnar 20. If she does not have any symptoms of asthma then she does not need a pneumonia vaccine until 65. I hope this helps clarification varghese.

## 2023-03-02 NOTE — TELEPHONE ENCOUNTER
Patient is inquiring if she should should get the new pneumonia shot that is out. . please advise. Pierce Panda

## 2023-03-03 NOTE — TELEPHONE ENCOUNTER
Spoke to patient, Advised that this is indicated for 65+ unless you have a comorbid risk such as asthma. She does not have current asthma symptoms, she was inquiring as she seems to get viral infections in the winter and was wondering if she may benefit from prevnar. Advised that unless asthma symptoms are present then she does not qualify for this vaccine, patient verbalized understanding.

## 2023-03-08 ENCOUNTER — OFFICE VISIT (OUTPATIENT)
Dept: INTERNAL MEDICINE CLINIC | Facility: CLINIC | Age: 28
End: 2023-03-08
Payer: COMMERCIAL

## 2023-03-08 VITALS
DIASTOLIC BLOOD PRESSURE: 80 MMHG | SYSTOLIC BLOOD PRESSURE: 120 MMHG | HEIGHT: 69 IN | RESPIRATION RATE: 14 BRPM | TEMPERATURE: 97 F | WEIGHT: 293 LBS | OXYGEN SATURATION: 98 % | BODY MASS INDEX: 43.4 KG/M2 | HEART RATE: 77 BPM

## 2023-03-08 DIAGNOSIS — J02.9 SORE THROAT: Primary | ICD-10-CM

## 2023-03-08 LAB
CONTROL LINE PRESENT WITH A CLEAR BACKGROUND (YES/NO): YES YES/NO
KIT LOT #: NORMAL NUMERIC

## 2023-03-08 PROCEDURE — 3008F BODY MASS INDEX DOCD: CPT | Performed by: NURSE PRACTITIONER

## 2023-03-08 PROCEDURE — 99213 OFFICE O/P EST LOW 20 MIN: CPT | Performed by: NURSE PRACTITIONER

## 2023-03-08 PROCEDURE — 87880 STREP A ASSAY W/OPTIC: CPT | Performed by: NURSE PRACTITIONER

## 2023-03-08 PROCEDURE — 3079F DIAST BP 80-89 MM HG: CPT | Performed by: NURSE PRACTITIONER

## 2023-03-08 PROCEDURE — 3074F SYST BP LT 130 MM HG: CPT | Performed by: NURSE PRACTITIONER

## 2023-03-09 ENCOUNTER — TELEPHONE (OUTPATIENT)
Dept: INTERNAL MEDICINE CLINIC | Facility: CLINIC | Age: 28
End: 2023-03-09

## 2023-03-09 NOTE — TELEPHONE ENCOUNTER
Per  [facility] YUNIOR Omer, Called pt to let her know her strep specimen went to the lab without a lab and the lab would not process it. Asked pt if she would like to come back to the office to be re-swabbed or if she would prefer to follow Cecilia's plan to provide a status tomorrow on her condition and start medications based on condition update. Pt stated she preferred not to be re-swabbed and she would send Jeff Kruse a condition update tomorrow. Pt stated her condition today is the same as yesterday and is not worse. Thanked pt for her time and apologized for the lab issue. Pt stated she understood and agreed to the plan going forward.

## 2023-03-10 ENCOUNTER — PATIENT MESSAGE (OUTPATIENT)
Dept: INTERNAL MEDICINE CLINIC | Facility: CLINIC | Age: 28
End: 2023-03-10

## 2023-03-10 RX ORDER — AMOXICILLIN AND CLAVULANATE POTASSIUM 875; 125 MG/1; MG/1
1 TABLET, FILM COATED ORAL 2 TIMES DAILY
Qty: 20 TABLET | Refills: 0 | Status: SHIPPED | OUTPATIENT
Start: 2023-03-10 | End: 2023-03-20

## 2023-03-10 NOTE — TELEPHONE ENCOUNTER
Called pt re [employer] message, left vm to return call.  [employer] message sent also

## 2023-03-10 NOTE — TELEPHONE ENCOUNTER
I do think we should do an antibiotic at this time since she is getting more mucus out and starting to cough along with the sore throat. Antibiotic was sent to her pharmacy. Please advise her to do probiotic, watch for side effects. She can send an update on Monday with how she is doing. Continue supportive care. If she would like to do a home COVID test just as a precaution that would be fine.

## 2023-03-10 NOTE — TELEPHONE ENCOUNTER
Called pt re rohithart message with condition update. feels worse today, any new recommendations? .     Seen in office 3-8-23 for sore throat. Today she is afebrile,  Throat feels swollen,  post nasal drip,  occasional nausea, and  Intermittent cough productive of white mucous. Denies h/a, chest pain, sob. Eating and drinking normally, able to work today.

## 2023-03-10 NOTE — TELEPHONE ENCOUNTER
From: Juan Mejia  To: YUNIOR Pardo  Sent: 3/10/2023 8:21 AM CST  Subject: Update    Hi. With an update on how I am. My throat is still feeling swollen and now I have drainage and a productive cough. I was the same then took a turn yesterday afternoon early evening.    Happy friday   Pamela Marshall

## 2023-04-14 ENCOUNTER — OFFICE VISIT (OUTPATIENT)
Dept: INTERNAL MEDICINE CLINIC | Facility: CLINIC | Age: 28
End: 2023-04-14
Payer: COMMERCIAL

## 2023-04-14 VITALS
HEART RATE: 73 BPM | RESPIRATION RATE: 16 BRPM | TEMPERATURE: 98 F | WEIGHT: 293 LBS | HEIGHT: 69 IN | DIASTOLIC BLOOD PRESSURE: 90 MMHG | SYSTOLIC BLOOD PRESSURE: 112 MMHG | OXYGEN SATURATION: 98 % | BODY MASS INDEX: 43.4 KG/M2

## 2023-04-14 DIAGNOSIS — M54.6 ACUTE MIDLINE THORACIC BACK PAIN: ICD-10-CM

## 2023-04-14 DIAGNOSIS — M54.50 ACUTE MIDLINE LOW BACK PAIN WITHOUT SCIATICA: Primary | ICD-10-CM

## 2023-04-14 PROCEDURE — 3074F SYST BP LT 130 MM HG: CPT | Performed by: NURSE PRACTITIONER

## 2023-04-14 PROCEDURE — 3080F DIAST BP >= 90 MM HG: CPT | Performed by: NURSE PRACTITIONER

## 2023-04-14 PROCEDURE — 3008F BODY MASS INDEX DOCD: CPT | Performed by: NURSE PRACTITIONER

## 2023-04-14 PROCEDURE — 99214 OFFICE O/P EST MOD 30 MIN: CPT | Performed by: NURSE PRACTITIONER

## 2023-04-14 RX ORDER — IBUPROFEN 200 MG
800 TABLET ORAL AS NEEDED
COMMUNITY

## 2023-04-14 RX ORDER — MELOXICAM 15 MG/1
15 TABLET ORAL
Qty: 90 TABLET | Refills: 0 | Status: SHIPPED | OUTPATIENT
Start: 2023-04-14

## 2023-04-14 RX ORDER — CYCLOBENZAPRINE HCL 5 MG
TABLET ORAL 3 TIMES DAILY PRN
Qty: 21 TABLET | Refills: 0 | Status: SHIPPED | OUTPATIENT
Start: 2023-04-14

## 2023-04-14 NOTE — PATIENT INSTRUCTIONS
Stop ibuprofen    Start meloxicam daily. Take it with food and monitor for side effects. Do not lay down for 1 hour after taking it. Take the Flexeril as needed for muscle spasms. Do not drive or drink alcohol while taking this medication. Monitor for drowsiness, side effects, allergy. Continue Tylenol 1000 mg every 6 hours as needed, topical medications such as Voltaren gel-lidocaine-Bengay, ice and heat application, and rest.    Monitor closely for improvement. Send an update if it is not improving. If you develop weakness or loss of bowel or bladder function please go to the emergency room in that case.

## 2023-04-17 ENCOUNTER — TELEPHONE (OUTPATIENT)
Dept: INTERNAL MEDICINE CLINIC | Facility: CLINIC | Age: 28
End: 2023-04-17

## 2023-04-17 DIAGNOSIS — M54.41 ACUTE MIDLINE LOW BACK PAIN WITH RIGHT-SIDED SCIATICA: ICD-10-CM

## 2023-04-17 DIAGNOSIS — M54.40 ACUTE MIDLINE LOW BACK PAIN WITH SCIATICA, SCIATICA LATERALITY UNSPECIFIED: Primary | ICD-10-CM

## 2023-04-17 NOTE — TELEPHONE ENCOUNTER
Condition update:Pt reports she has followed provider's advice from [address] 4/14/23 thoracic/low back pain with sciatica. Today Pt reports that upper back pain is much better. Low back is still painful and tender with occasional shooting pain downs right leg. Meloxicam with Tylenol and fexeril is helping to control pain. Voltaren gel and Biofreeze are helping somewhat. Using ice and heat, resting  Pt asking what her next step should be.

## 2023-04-17 NOTE — TELEPHONE ENCOUNTER
Please place a referral for physical therapy and have her schedule. Also order a back xray. If it continues to get better through the week and resolves no need to do either. If it is not getting better get the xray and start PT. Thanks.

## 2023-04-18 ENCOUNTER — HOSPITAL ENCOUNTER (OUTPATIENT)
Dept: GENERAL RADIOLOGY | Facility: HOSPITAL | Age: 28
Discharge: HOME | End: 2023-04-18
Attending: NURSE PRACTITIONER
Payer: COMMERCIAL

## 2023-04-18 DIAGNOSIS — M54.41 ACUTE MIDLINE LOW BACK PAIN WITH RIGHT-SIDED SCIATICA: ICD-10-CM

## 2023-04-18 PROCEDURE — 72100 X-RAY EXAM L-S SPINE 2/3 VWS: CPT | Performed by: NURSE PRACTITIONER

## 2023-04-18 NOTE — TELEPHONE ENCOUNTER
PT and Xray orders placed. This morning pt states pain is no worse but no better, continuing home care. Pt informed of  provider's advice. Pt verbalizes understanding. Provided contact information for Edward Pt and central scheduling. Advised pt to check with insurance plan re physical therapy benefits.

## 2023-04-19 DIAGNOSIS — M54.41 ACUTE MIDLINE LOW BACK PAIN WITH RIGHT-SIDED SCIATICA: Primary | ICD-10-CM

## 2023-04-19 DIAGNOSIS — M51.36 NARROWING OF LUMBAR INTERVERTEBRAL DISC SPACE: ICD-10-CM

## 2023-04-24 ENCOUNTER — OFFICE VISIT (OUTPATIENT)
Dept: PODIATRY CLINIC | Facility: CLINIC | Age: 28
End: 2023-04-24

## 2023-04-24 VITALS — DIASTOLIC BLOOD PRESSURE: 90 MMHG | SYSTOLIC BLOOD PRESSURE: 128 MMHG

## 2023-04-24 DIAGNOSIS — M79.672 LEFT FOOT PAIN: Primary | ICD-10-CM

## 2023-04-24 DIAGNOSIS — M67.479 GANGLION CYST OF FOOT: ICD-10-CM

## 2023-04-24 PROCEDURE — 3074F SYST BP LT 130 MM HG: CPT | Performed by: PODIATRIST

## 2023-04-24 PROCEDURE — 3080F DIAST BP >= 90 MM HG: CPT | Performed by: PODIATRIST

## 2023-04-24 PROCEDURE — 20612 ASPIRATE/INJ GANGLION CYST: CPT | Performed by: PODIATRIST

## 2023-04-24 RX ORDER — TRIAMCINOLONE ACETONIDE 40 MG/ML
20 INJECTION, SUSPENSION INTRA-ARTICULAR; INTRAMUSCULAR ONCE
Status: COMPLETED | OUTPATIENT
Start: 2023-04-24 | End: 2023-04-24

## 2023-04-24 RX ADMIN — TRIAMCINOLONE ACETONIDE 20 MG: 40 INJECTION, SUSPENSION INTRA-ARTICULAR; INTRAMUSCULAR at 18:15:00

## 2023-04-24 NOTE — PROGRESS NOTES
Per Dr. Watson Due to draw up 0.5 ml  of Kenalog 40 and 0.5 ml of 0.5% Marcaine for injection to left foot.

## 2023-05-12 ENCOUNTER — OFFICE VISIT (OUTPATIENT)
Dept: NEUROLOGY | Facility: CLINIC | Age: 28
End: 2023-05-12
Payer: COMMERCIAL

## 2023-05-12 ENCOUNTER — OFFICE VISIT (OUTPATIENT)
Dept: PAIN CLINIC | Facility: CLINIC | Age: 28
End: 2023-05-12
Payer: COMMERCIAL

## 2023-05-12 VITALS — DIASTOLIC BLOOD PRESSURE: 78 MMHG | SYSTOLIC BLOOD PRESSURE: 118 MMHG | OXYGEN SATURATION: 99 % | HEART RATE: 82 BPM

## 2023-05-12 VITALS
RESPIRATION RATE: 16 BRPM | SYSTOLIC BLOOD PRESSURE: 145 MMHG | HEIGHT: 69 IN | HEART RATE: 83 BPM | WEIGHT: 293 LBS | DIASTOLIC BLOOD PRESSURE: 73 MMHG | BODY MASS INDEX: 43.4 KG/M2

## 2023-05-12 DIAGNOSIS — M51.36 DDD (DEGENERATIVE DISC DISEASE), LUMBAR: ICD-10-CM

## 2023-05-12 DIAGNOSIS — G43.109 MIGRAINE WITH AURA AND WITHOUT STATUS MIGRAINOSUS, NOT INTRACTABLE: Primary | ICD-10-CM

## 2023-05-12 DIAGNOSIS — M47.816 LUMBAR SPONDYLOSIS: Primary | ICD-10-CM

## 2023-05-12 PROCEDURE — 3074F SYST BP LT 130 MM HG: CPT | Performed by: PHYSICIAN ASSISTANT

## 2023-05-12 PROCEDURE — 3078F DIAST BP <80 MM HG: CPT | Performed by: OTHER

## 2023-05-12 PROCEDURE — 99204 OFFICE O/P NEW MOD 45 MIN: CPT | Performed by: PHYSICIAN ASSISTANT

## 2023-05-12 PROCEDURE — 3078F DIAST BP <80 MM HG: CPT | Performed by: PHYSICIAN ASSISTANT

## 2023-05-12 PROCEDURE — 3077F SYST BP >= 140 MM HG: CPT | Performed by: OTHER

## 2023-05-12 PROCEDURE — 99214 OFFICE O/P EST MOD 30 MIN: CPT | Performed by: OTHER

## 2023-05-12 PROCEDURE — 3008F BODY MASS INDEX DOCD: CPT | Performed by: OTHER

## 2023-05-12 RX ORDER — ZONISAMIDE 50 MG/1
50 CAPSULE ORAL 2 TIMES DAILY
Qty: 180 CAPSULE | Refills: 1 | Status: SHIPPED | OUTPATIENT
Start: 2023-05-12

## 2023-05-12 RX ORDER — METHYLPREDNISOLONE 4 MG/1
TABLET ORAL
Qty: 21 TABLET | Refills: 0 | Status: SHIPPED | OUTPATIENT
Start: 2023-05-12 | End: 2023-05-12 | Stop reason: ALTCHOICE

## 2023-06-08 NOTE — TELEPHONE ENCOUNTER
OK for meds Oral Minoxidil Counseling- I discussed with the patient the risks of oral minoxidil including but not limited to shortness of breath, swelling of the feet or ankles, dizziness, lightheadedness, unwanted hair growth and allergic reaction.  The patient verbalized understanding of the proper use and possible adverse effects of oral minoxidil.  All of the patient's questions and concerns were addressed.

## 2023-06-16 ENCOUNTER — TELEMEDICINE (OUTPATIENT)
Dept: INTERNAL MEDICINE CLINIC | Facility: CLINIC | Age: 28
End: 2023-06-16

## 2023-06-16 DIAGNOSIS — E88.81 METABOLIC SYNDROME: ICD-10-CM

## 2023-06-16 DIAGNOSIS — Z51.81 ENCOUNTER FOR THERAPEUTIC DRUG MONITORING: Primary | ICD-10-CM

## 2023-06-16 DIAGNOSIS — E66.01 CLASS 3 SEVERE OBESITY DUE TO EXCESS CALORIES WITHOUT SERIOUS COMORBIDITY WITH BODY MASS INDEX (BMI) OF 45.0 TO 49.9 IN ADULT (HCC): ICD-10-CM

## 2023-06-16 DIAGNOSIS — E88.81 INSULIN RESISTANCE: ICD-10-CM

## 2023-06-16 DIAGNOSIS — E78.5 HYPERLIPIDEMIA, UNSPECIFIED HYPERLIPIDEMIA TYPE: ICD-10-CM

## 2023-06-16 PROCEDURE — 99213 OFFICE O/P EST LOW 20 MIN: CPT | Performed by: PHYSICIAN ASSISTANT

## 2023-06-19 ENCOUNTER — OFFICE VISIT (OUTPATIENT)
Dept: PODIATRY CLINIC | Facility: CLINIC | Age: 28
End: 2023-06-19

## 2023-06-19 ENCOUNTER — TELEPHONE (OUTPATIENT)
Dept: INTERNAL MEDICINE CLINIC | Facility: CLINIC | Age: 28
End: 2023-06-19

## 2023-06-19 DIAGNOSIS — M67.479 GANGLION CYST OF FOOT: ICD-10-CM

## 2023-06-19 DIAGNOSIS — M79.672 LEFT FOOT PAIN: Primary | ICD-10-CM

## 2023-06-19 PROCEDURE — 99213 OFFICE O/P EST LOW 20 MIN: CPT | Performed by: PODIATRIST

## 2023-06-23 ENCOUNTER — PATIENT MESSAGE (OUTPATIENT)
Dept: INTERNAL MEDICINE CLINIC | Facility: CLINIC | Age: 28
End: 2023-06-23

## 2023-06-25 NOTE — TELEPHONE ENCOUNTER
From: Eliot Check  To: Nereida Alvarez PA-C  Sent: 6/23/2023 3:21 PM CDT  Subject: MGNVRU    I was wondering if you heard anything about the wegovy. I checked in pharmacy and they didn't have anything.    Carlos Bustillo

## 2023-07-05 ENCOUNTER — PATIENT MESSAGE (OUTPATIENT)
Dept: INTERNAL MEDICINE CLINIC | Facility: CLINIC | Age: 28
End: 2023-07-05

## 2023-07-05 NOTE — TELEPHONE ENCOUNTER
From: Echo Wu  Sent: 7/5/2023 11:36 AM CDT  To: [facility] [address] Clinical Staff  Subject: Appt    Disregard

## 2023-07-10 DIAGNOSIS — M54.50 ACUTE MIDLINE LOW BACK PAIN WITHOUT SCIATICA: ICD-10-CM

## 2023-07-11 ENCOUNTER — TELEPHONE (OUTPATIENT)
Dept: INTERNAL MEDICINE CLINIC | Facility: CLINIC | Age: 28
End: 2023-07-11

## 2023-07-13 RX ORDER — MELOXICAM 15 MG/1
15 TABLET ORAL
Qty: 90 TABLET | Refills: 0 | Status: SHIPPED | OUTPATIENT
Start: 2023-07-13

## 2023-07-13 NOTE — PROGRESS NOTES
Quick Note:    Called patient with negative results. Pt is feeling better and will continue monitoring sx.  Will return to clinic if sx increase or persist.   Pt voiced understanding.  ______ Anesthesia Type: 0.5% lidocaine with 1:200,000 epinephrine and a 1:10 solution of 8.4% sodium bicarbonate

## 2023-07-13 NOTE — TELEPHONE ENCOUNTER
Last OV relevant to medication: 4/14/23  Last refill date: 4/14/23 #90/refills: 0  When pt was asked to return for OV: when routine care is due (PE due 10/10)  Upcoming appt/reason:   Future Appointments   Date Time Provider Sara Woodard   7/15/2023  8:40 AM YUNIOR Delatorre [facility] [place]   8/14/2023  3:00 PM Sadie Banks MD [facility] [place]   8/28/2023  5:20 PM Kemar Witt APRN [facility] [place]   11/3/2023  7:30 AM Dasha Tang MD [facility] [place]   12/27/2023  8:00 AM Tennille Sousa MD [facility]   Was pt informed of any over due labs: utd

## 2023-07-24 ENCOUNTER — OFFICE VISIT (OUTPATIENT)
Dept: INTERNAL MEDICINE CLINIC | Facility: CLINIC | Age: 28
End: 2023-07-24
Payer: COMMERCIAL

## 2023-07-24 VITALS
SYSTOLIC BLOOD PRESSURE: 140 MMHG | HEIGHT: 69 IN | HEART RATE: 90 BPM | RESPIRATION RATE: 20 BRPM | OXYGEN SATURATION: 98 % | DIASTOLIC BLOOD PRESSURE: 78 MMHG | BODY MASS INDEX: 43.4 KG/M2 | WEIGHT: 293 LBS | TEMPERATURE: 98 F

## 2023-07-24 DIAGNOSIS — Z00.00 PHYSICAL EXAM: ICD-10-CM

## 2023-07-24 DIAGNOSIS — Z13.29 SCREENING FOR THYROID DISORDER: ICD-10-CM

## 2023-07-24 DIAGNOSIS — R22.42 LOCALIZED SWELLING OF LEFT FOOT: Primary | ICD-10-CM

## 2023-07-24 DIAGNOSIS — Z13.220 SCREENING FOR CHOLESTEROL LEVEL: ICD-10-CM

## 2023-07-24 PROCEDURE — 99214 OFFICE O/P EST MOD 30 MIN: CPT | Performed by: NURSE PRACTITIONER

## 2023-07-24 PROCEDURE — 3077F SYST BP >= 140 MM HG: CPT | Performed by: NURSE PRACTITIONER

## 2023-07-24 PROCEDURE — 3078F DIAST BP <80 MM HG: CPT | Performed by: NURSE PRACTITIONER

## 2023-07-24 PROCEDURE — 3008F BODY MASS INDEX DOCD: CPT | Performed by: NURSE PRACTITIONER

## 2023-07-24 NOTE — PATIENT INSTRUCTIONS
Elevate your legs a couple times a day    Low salt diet    Compression stockings    Topical meds for pain as needed    Get your labs done after Aug 9th. You should be fasting for at least 10 hours. If you take a multivitamin with Biotin or any biotin product it should be held for 3 days prior to getting your labs done.      Follow up as planned for your routine care

## 2023-07-26 NOTE — TELEPHONE ENCOUNTER
PA for Laurent Vazquez in [facility] approved    Your prior authorization for Laurent Vazquez has been approved! MORE INFO  For eligible patients, copay assistance may be available. To learn more and be redirected to the Josiasaravind Packer website, click on the [facility] Info\" button to the right. Please also note that you may need to schedule a follow-up visit with your patient prior to the expiration of this prior authorization, as updated patient weight may be required for reauthorization. Message from plan: Your request was approved based on the initial information provided at the time of the coverage request submission. Please allow additional time for the final decision to be made and added to the patient's account. Fall with Harm Risk

## 2023-08-09 ENCOUNTER — TELEPHONE (OUTPATIENT)
Dept: INTERNAL MEDICINE CLINIC | Facility: CLINIC | Age: 28
End: 2023-08-09

## 2023-08-09 DIAGNOSIS — R51.9 GENERALIZED HEADACHES: Primary | ICD-10-CM

## 2023-08-09 NOTE — TELEPHONE ENCOUNTER
Insurance: [facility] hmo   Provider's Name?: Tracy price  Provider's Specialty?: neuro  Reason for Visit?: follow up   Diagnosis?: generalized headaches  Number of Visits Requested?: 4  Last Visit with Specialist?: 5/12/23   Is Appt.  Already Scheduled?: yes        If so, Date?:  8/14/23  Once referral is approved pt can see referral via [address]

## 2023-08-14 ENCOUNTER — TELEPHONE (OUTPATIENT)
Dept: PHYSICAL THERAPY | Facility: HOSPITAL | Age: 28
End: 2023-08-14

## 2023-08-15 ENCOUNTER — APPOINTMENT (OUTPATIENT)
Dept: PHYSICAL THERAPY | Age: 28
End: 2023-08-15
Attending: NURSE PRACTITIONER
Payer: COMMERCIAL

## 2023-08-22 ENCOUNTER — APPOINTMENT (OUTPATIENT)
Dept: PHYSICAL THERAPY | Age: 28
End: 2023-08-22
Attending: NURSE PRACTITIONER
Payer: COMMERCIAL

## 2023-08-26 ENCOUNTER — LAB ENCOUNTER (OUTPATIENT)
Dept: LAB | Facility: REFERENCE LAB | Age: 28
End: 2023-08-26
Attending: INTERNAL MEDICINE
Payer: COMMERCIAL

## 2023-08-26 DIAGNOSIS — Z13.220 SCREENING FOR CHOLESTEROL LEVEL: ICD-10-CM

## 2023-08-26 DIAGNOSIS — Z00.00 PHYSICAL EXAM: ICD-10-CM

## 2023-08-26 DIAGNOSIS — Z13.29 SCREENING FOR THYROID DISORDER: ICD-10-CM

## 2023-08-26 LAB
ALBUMIN SERPL-MCNC: 3.1 G/DL (ref 3.4–5)
ALBUMIN/GLOB SERPL: 0.9 {RATIO} (ref 1–2)
ALP LIVER SERPL-CCNC: 80 U/L
ALT SERPL-CCNC: 17 U/L
ANION GAP SERPL CALC-SCNC: 6 MMOL/L (ref 0–18)
AST SERPL-CCNC: 11 U/L (ref 15–37)
BASOPHILS # BLD AUTO: 0.04 X10(3) UL (ref 0–0.2)
BASOPHILS NFR BLD AUTO: 0.3 %
BILIRUB SERPL-MCNC: 0.3 MG/DL (ref 0.1–2)
BUN BLD-MCNC: 9 MG/DL (ref 7–18)
BUN/CREAT SERPL: 13.6 (ref 10–20)
CALCIUM BLD-MCNC: 8.5 MG/DL (ref 8.5–10.1)
CHLORIDE SERPL-SCNC: 109 MMOL/L (ref 98–112)
CHOLEST SERPL-MCNC: 141 MG/DL (ref ?–200)
CO2 SERPL-SCNC: 25 MMOL/L (ref 21–32)
CREAT BLD-MCNC: 0.66 MG/DL
DEPRECATED RDW RBC AUTO: 42 FL (ref 35.1–46.3)
EGFRCR SERPLBLD CKD-EPI 2021: 121 ML/MIN/1.73M2 (ref 60–?)
EOSINOPHIL # BLD AUTO: 0.15 X10(3) UL (ref 0–0.7)
EOSINOPHIL NFR BLD AUTO: 1.3 %
ERYTHROCYTE [DISTWIDTH] IN BLOOD BY AUTOMATED COUNT: 13.2 % (ref 11–15)
FASTING PATIENT LIPID ANSWER: YES
FASTING STATUS PATIENT QL REPORTED: YES
GLOBULIN PLAS-MCNC: 3.4 G/DL (ref 2.8–4.4)
GLUCOSE BLD-MCNC: 108 MG/DL (ref 70–99)
HCT VFR BLD AUTO: 36.1 %
HDLC SERPL-MCNC: 38 MG/DL (ref 40–59)
HGB BLD-MCNC: 11.9 G/DL
IMM GRANULOCYTES # BLD AUTO: 0.06 X10(3) UL (ref 0–1)
IMM GRANULOCYTES NFR BLD: 0.5 %
LDLC SERPL CALC-MCNC: 63 MG/DL (ref ?–100)
LYMPHOCYTES # BLD AUTO: 2.73 X10(3) UL (ref 1–4)
LYMPHOCYTES NFR BLD AUTO: 23.5 %
MCH RBC QN AUTO: 28.8 PG (ref 26–34)
MCHC RBC AUTO-ENTMCNC: 33 G/DL (ref 31–37)
MCV RBC AUTO: 87.4 FL
MONOCYTES # BLD AUTO: 0.49 X10(3) UL (ref 0.1–1)
MONOCYTES NFR BLD AUTO: 4.2 %
NEUTROPHILS # BLD AUTO: 8.15 X10 (3) UL (ref 1.5–7.7)
NEUTROPHILS # BLD AUTO: 8.15 X10(3) UL (ref 1.5–7.7)
NEUTROPHILS NFR BLD AUTO: 70.2 %
NONHDLC SERPL-MCNC: 103 MG/DL (ref ?–130)
OSMOLALITY SERPL CALC.SUM OF ELEC: 289 MOSM/KG (ref 275–295)
PLATELET # BLD AUTO: 241 10(3)UL (ref 150–450)
POTASSIUM SERPL-SCNC: 4.1 MMOL/L (ref 3.5–5.1)
PROT SERPL-MCNC: 6.5 G/DL (ref 6.4–8.2)
RBC # BLD AUTO: 4.13 X10(6)UL
SODIUM SERPL-SCNC: 140 MMOL/L (ref 136–145)
TRIGL SERPL-MCNC: 247 MG/DL (ref 30–149)
TSI SER-ACNC: 1.27 MIU/ML (ref 0.36–3.74)
VLDLC SERPL CALC-MCNC: 37 MG/DL (ref 0–30)
WBC # BLD AUTO: 11.6 X10(3) UL (ref 4–11)

## 2023-08-26 PROCEDURE — 80053 COMPREHEN METABOLIC PANEL: CPT

## 2023-08-26 PROCEDURE — 84443 ASSAY THYROID STIM HORMONE: CPT

## 2023-08-26 PROCEDURE — 80061 LIPID PANEL: CPT

## 2023-08-26 PROCEDURE — 85025 COMPLETE CBC W/AUTO DIFF WBC: CPT

## 2023-08-28 ENCOUNTER — OFFICE VISIT (OUTPATIENT)
Dept: INTERNAL MEDICINE CLINIC | Facility: CLINIC | Age: 28
End: 2023-08-28
Payer: COMMERCIAL

## 2023-08-28 VITALS
RESPIRATION RATE: 20 BRPM | HEART RATE: 82 BPM | SYSTOLIC BLOOD PRESSURE: 160 MMHG | TEMPERATURE: 98 F | OXYGEN SATURATION: 99 % | HEIGHT: 69 IN | BODY MASS INDEX: 43.4 KG/M2 | WEIGHT: 293 LBS | DIASTOLIC BLOOD PRESSURE: 90 MMHG

## 2023-08-28 DIAGNOSIS — Z00.00 ENCOUNTER FOR ANNUAL PHYSICAL EXAM: Primary | ICD-10-CM

## 2023-08-28 DIAGNOSIS — R03.0 ELEVATED BP WITHOUT DIAGNOSIS OF HYPERTENSION: ICD-10-CM

## 2023-08-28 DIAGNOSIS — Z13.29 SCREENING FOR THYROID DISORDER: ICD-10-CM

## 2023-08-28 DIAGNOSIS — R73.03 PREDIABETES: ICD-10-CM

## 2023-08-28 DIAGNOSIS — E66.01 CLASS 3 SEVERE OBESITY DUE TO EXCESS CALORIES WITHOUT SERIOUS COMORBIDITY WITH BODY MASS INDEX (BMI) OF 45.0 TO 49.9 IN ADULT (HCC): ICD-10-CM

## 2023-08-28 DIAGNOSIS — Z13.220 SCREENING FOR CHOLESTEROL LEVEL: ICD-10-CM

## 2023-08-28 PROCEDURE — 99395 PREV VISIT EST AGE 18-39: CPT | Performed by: NURSE PRACTITIONER

## 2023-08-28 PROCEDURE — 3008F BODY MASS INDEX DOCD: CPT | Performed by: NURSE PRACTITIONER

## 2023-08-28 PROCEDURE — 3080F DIAST BP >= 90 MM HG: CPT | Performed by: NURSE PRACTITIONER

## 2023-08-28 PROCEDURE — 3077F SYST BP >= 140 MM HG: CPT | Performed by: NURSE PRACTITIONER

## 2023-08-28 RX ORDER — SEMAGLUTIDE 0.68 MG/ML
INJECTION, SOLUTION SUBCUTANEOUS
Qty: 1 EACH | Refills: 0 | Status: SHIPPED | OUTPATIENT
Start: 2023-08-28 | End: 2023-10-23

## 2023-08-28 NOTE — TELEPHONE ENCOUNTER
Amena Billings approved but she cannot afford the cost of the approved medicine - $100 for 30 days  Please advise

## 2023-08-29 ENCOUNTER — APPOINTMENT (OUTPATIENT)
Dept: PHYSICAL THERAPY | Age: 28
End: 2023-08-29
Attending: NURSE PRACTITIONER
Payer: COMMERCIAL

## 2023-09-05 ENCOUNTER — TELEPHONE (OUTPATIENT)
Dept: INTERNAL MEDICINE CLINIC | Facility: CLINIC | Age: 28
End: 2023-09-05

## 2023-09-05 ENCOUNTER — HOSPITAL ENCOUNTER (OUTPATIENT)
Dept: MAMMOGRAPHY | Facility: HOSPITAL | Age: 28
Discharge: HOME | End: 2023-09-05
Attending: OBSTETRICS & GYNECOLOGY
Payer: COMMERCIAL

## 2023-09-05 ENCOUNTER — APPOINTMENT (OUTPATIENT)
Dept: PHYSICAL THERAPY | Age: 28
End: 2023-09-05
Attending: NURSE PRACTITIONER
Payer: COMMERCIAL

## 2023-09-05 DIAGNOSIS — Z80.3 FAMILY HISTORY OF BREAST CANCER: ICD-10-CM

## 2023-09-05 PROCEDURE — 77067 SCR MAMMO BI INCL CAD: CPT | Performed by: OBSTETRICS & GYNECOLOGY

## 2023-09-05 PROCEDURE — 77063 BREAST TOMOSYNTHESIS BI: CPT | Performed by: OBSTETRICS & GYNECOLOGY

## 2023-09-05 NOTE — TELEPHONE ENCOUNTER
Entered PA for Ozempic in epic  Not diabetic  Got a denial - how do you want to proceed? Closed    Product not covered by this plan. Prior Authorization not available. Case ID: [id number]      Payer: [facility]    [phone]    [phone]   Product not covered for this health plan/disease state/medication as submitted. Product not covered by this plan for this diagnosis. For a FDA label approved diagnosis, please resubmit with an ICD 10 code or submit via other methods.    View History    Medication Being Authorized     semaglutide (OZEMPIC, 0.25 OR 0.5 MG/DOSE,) 2 MG/3ML Subcutaneous Solution Pen-injector

## 2023-09-06 NOTE — TELEPHONE ENCOUNTER
So I know she has been on Saxenda but it was too expensive  Does she know what the cost for wegovy would be, would it be more affordable? Has she titrated up to the full dose of Saxenda? We could always switch to [facility]BRENNA PIERCE.

## 2023-09-12 ENCOUNTER — TELEMEDICINE (OUTPATIENT)
Dept: INTERNAL MEDICINE CLINIC | Facility: CLINIC | Age: 28
End: 2023-09-12

## 2023-09-12 ENCOUNTER — APPOINTMENT (OUTPATIENT)
Dept: PHYSICAL THERAPY | Age: 28
End: 2023-09-12
Attending: NURSE PRACTITIONER
Payer: COMMERCIAL

## 2023-09-12 VITALS — TEMPERATURE: 98 F

## 2023-09-12 DIAGNOSIS — M79.604 PAIN IN BOTH LOWER EXTREMITIES: Primary | ICD-10-CM

## 2023-09-12 DIAGNOSIS — M79.605 PAIN IN BOTH LOWER EXTREMITIES: Primary | ICD-10-CM

## 2023-09-12 DIAGNOSIS — L29.9 ITCHING: ICD-10-CM

## 2023-09-12 PROCEDURE — 99214 OFFICE O/P EST MOD 30 MIN: CPT | Performed by: NURSE PRACTITIONER

## 2023-09-12 RX ORDER — TRIAMCINOLONE ACETONIDE 1 MG/G
CREAM TOPICAL 2 TIMES DAILY PRN
Qty: 60 G | Refills: 0 | Status: SHIPPED | OUTPATIENT
Start: 2023-09-12

## 2023-09-13 ENCOUNTER — TELEPHONE (OUTPATIENT)
Dept: INTERNAL MEDICINE CLINIC | Facility: CLINIC | Age: 28
End: 2023-09-13

## 2023-09-19 ENCOUNTER — APPOINTMENT (OUTPATIENT)
Dept: PHYSICAL THERAPY | Age: 28
End: 2023-09-19
Attending: NURSE PRACTITIONER
Payer: COMMERCIAL

## 2023-09-20 ENCOUNTER — PATIENT MESSAGE (OUTPATIENT)
Dept: INTERNAL MEDICINE CLINIC | Facility: CLINIC | Age: 28
End: 2023-09-20

## 2023-09-21 NOTE — TELEPHONE ENCOUNTER
Yes start the BP medication as advised. If the rash is gone hold off on the ultrasound.  Do the repeat lab after the BP med and follow up in a couple weeks for a BP check

## 2023-09-21 NOTE — TELEPHONE ENCOUNTER
From: Matilda Longoria  To: Felisa Ivory  Sent: 9/20/2023 6:51 PM CDT  Subject: Question    Hi! The rash went away a couple days ago but I still haven't made it in for the ultrasound did you want me to go still? Or start the bp medicine. Also I attached the daily blood pressure monitoring for you as well.       Blood pressure reading  9/12 156/89  9/13 140/69  9/14 142/70  9/15 131/88  9/16 150/87  9/17 128/110  9/18 133/94  9/19 153/98  9/20 167/83

## 2023-10-10 ENCOUNTER — HOSPITAL ENCOUNTER (OUTPATIENT)
Dept: MAMMOGRAPHY | Facility: HOSPITAL | Age: 28
Discharge: HOME | End: 2023-10-10
Attending: OBSTETRICS & GYNECOLOGY
Payer: COMMERCIAL

## 2023-10-10 DIAGNOSIS — R92.2 INCONCLUSIVE MAMMOGRAM: ICD-10-CM

## 2023-10-10 PROCEDURE — 77065 DX MAMMO INCL CAD UNI: CPT | Performed by: OBSTETRICS & GYNECOLOGY

## 2023-10-10 PROCEDURE — 76642 ULTRASOUND BREAST LIMITED: CPT | Performed by: OBSTETRICS & GYNECOLOGY

## 2023-10-10 PROCEDURE — 77061 BREAST TOMOSYNTHESIS UNI: CPT | Performed by: OBSTETRICS & GYNECOLOGY

## 2023-11-06 ENCOUNTER — TELEPHONE (OUTPATIENT)
Dept: INTERNAL MEDICINE CLINIC | Facility: CLINIC | Age: 28
End: 2023-11-06

## 2023-11-06 NOTE — TELEPHONE ENCOUNTER
Future Appointments   Date Time Provider Department Center   11/13/2023  7:20 AM Nimco Mark PA-C [facility]   12/27/2023  8:00 AM Bear Price MD [facility] [place]   2/19/2024  5:00 PM Nimco Mark PA-C [facility]     Please schedule VV for the pt for med check, thanks     Left message to call back

## 2023-11-06 NOTE — TELEPHONE ENCOUNTER
Pt due for BMP after starting the hydrochlorothiazide and a med check. Cn be VV this week if she is checking her home BP.

## 2023-11-21 ENCOUNTER — OFFICE VISIT (OUTPATIENT)
Dept: INTERNAL MEDICINE CLINIC | Facility: CLINIC | Age: 28
End: 2023-11-21
Payer: COMMERCIAL

## 2023-11-21 VITALS
DIASTOLIC BLOOD PRESSURE: 86 MMHG | RESPIRATION RATE: 16 BRPM | HEIGHT: 69 IN | TEMPERATURE: 99 F | HEART RATE: 99 BPM | SYSTOLIC BLOOD PRESSURE: 132 MMHG | BODY MASS INDEX: 43.4 KG/M2 | OXYGEN SATURATION: 98 % | WEIGHT: 293 LBS

## 2023-11-21 DIAGNOSIS — I10 PRIMARY HYPERTENSION: Primary | ICD-10-CM

## 2023-11-21 DIAGNOSIS — J40 BRONCHITIS: ICD-10-CM

## 2023-11-21 DIAGNOSIS — R05.8 PRODUCTIVE COUGH: ICD-10-CM

## 2023-11-21 PROCEDURE — 3075F SYST BP GE 130 - 139MM HG: CPT | Performed by: NURSE PRACTITIONER

## 2023-11-21 PROCEDURE — 3079F DIAST BP 80-89 MM HG: CPT | Performed by: NURSE PRACTITIONER

## 2023-11-21 PROCEDURE — 99214 OFFICE O/P EST MOD 30 MIN: CPT | Performed by: NURSE PRACTITIONER

## 2023-11-21 PROCEDURE — 3008F BODY MASS INDEX DOCD: CPT | Performed by: NURSE PRACTITIONER

## 2023-11-21 RX ORDER — AMOXICILLIN AND CLAVULANATE POTASSIUM 875; 125 MG/1; MG/1
1 TABLET, FILM COATED ORAL 2 TIMES DAILY
Qty: 20 TABLET | Refills: 0 | Status: SHIPPED | OUTPATIENT
Start: 2023-11-21 | End: 2023-12-01

## 2023-11-21 RX ORDER — ALBUTEROL SULFATE 90 UG/1
2 AEROSOL, METERED RESPIRATORY (INHALATION) EVERY 4 HOURS PRN
Qty: 1 EACH | Refills: 0 | Status: SHIPPED | OUTPATIENT
Start: 2023-11-21

## 2023-11-21 RX ORDER — METHYLPREDNISOLONE 4 MG/1
TABLET ORAL
Qty: 1 EACH | Refills: 0 | Status: SHIPPED | OUTPATIENT
Start: 2023-11-21

## 2023-11-28 ENCOUNTER — OFFICE VISIT (OUTPATIENT)
Dept: INTERNAL MEDICINE CLINIC | Facility: CLINIC | Age: 28
End: 2023-11-28
Payer: COMMERCIAL

## 2023-11-28 VITALS
OXYGEN SATURATION: 97 % | DIASTOLIC BLOOD PRESSURE: 72 MMHG | HEIGHT: 69 IN | SYSTOLIC BLOOD PRESSURE: 128 MMHG | BODY MASS INDEX: 43.4 KG/M2 | HEART RATE: 89 BPM | TEMPERATURE: 97 F | WEIGHT: 293 LBS

## 2023-11-28 DIAGNOSIS — J40 BRONCHITIS: Primary | ICD-10-CM

## 2023-11-28 PROCEDURE — 3078F DIAST BP <80 MM HG: CPT | Performed by: STUDENT IN AN ORGANIZED HEALTH CARE EDUCATION/TRAINING PROGRAM

## 2023-11-28 PROCEDURE — 99213 OFFICE O/P EST LOW 20 MIN: CPT | Performed by: STUDENT IN AN ORGANIZED HEALTH CARE EDUCATION/TRAINING PROGRAM

## 2023-11-28 PROCEDURE — 3074F SYST BP LT 130 MM HG: CPT | Performed by: STUDENT IN AN ORGANIZED HEALTH CARE EDUCATION/TRAINING PROGRAM

## 2023-11-28 PROCEDURE — 3008F BODY MASS INDEX DOCD: CPT | Performed by: STUDENT IN AN ORGANIZED HEALTH CARE EDUCATION/TRAINING PROGRAM

## 2023-11-28 RX ORDER — FLUTICASONE FUROATE AND VILANTEROL 100; 25 UG/1; UG/1
1 POWDER RESPIRATORY (INHALATION) DAILY
Qty: 1 EACH | Refills: 0 | Status: SHIPPED | OUTPATIENT
Start: 2023-11-28 | End: 2023-11-29

## 2023-11-28 RX ORDER — BENZONATATE 200 MG/1
200 CAPSULE ORAL 3 TIMES DAILY PRN
Qty: 60 CAPSULE | Refills: 0 | Status: SHIPPED | OUTPATIENT
Start: 2023-11-28 | End: 2023-12-28

## 2023-11-28 NOTE — PATIENT INSTRUCTIONS
-Boil water with a cinnamon stick and cardamom, add tea once color of water changes. Add honey.   -Continue dayquil 1-2xs per day. -Over the counter dextromethorphan from pharmacist. (Robitussin/guafenesin DM). Avoid if driving due to sedation effects. Please call if symptoms are not improving after 1 week. Will do additional steroid taper if needed.

## 2023-11-29 RX ORDER — FLUTICASONE FUROATE AND VILANTEROL 100; 25 UG/1; UG/1
1 POWDER RESPIRATORY (INHALATION) DAILY
Qty: 180 EACH | Refills: 0 | Status: SHIPPED | OUTPATIENT
Start: 2023-11-29

## 2023-11-29 NOTE — TELEPHONE ENCOUNTER
Last OV relevant to medication: 11/28/23  Last refill date: yesterday- requesting 90 day supply  When pt was asked to return for OV: as needed  Upcoming appt/reason:   Future Appointments   Date Time Provider Sara Woodard   12/27/2023  8:00 AM Hannah Mosher MD [facility] [place]   2/19/2024  5:00 PM [facility]   5/13/2024  5:00 PM Marlon Hernandez [facility]C [facility]   Was pt informed of any over due labs: utd

## 2023-12-18 ENCOUNTER — OFFICE VISIT (OUTPATIENT)
Dept: INTERNAL MEDICINE CLINIC | Facility: CLINIC | Age: 28
End: 2023-12-18
Payer: COMMERCIAL

## 2023-12-18 VITALS
BODY MASS INDEX: 43.4 KG/M2 | OXYGEN SATURATION: 97 % | TEMPERATURE: 98 F | HEART RATE: 87 BPM | DIASTOLIC BLOOD PRESSURE: 70 MMHG | HEIGHT: 69 IN | SYSTOLIC BLOOD PRESSURE: 120 MMHG | WEIGHT: 293 LBS

## 2023-12-18 DIAGNOSIS — R21 FACIAL RASH: Primary | ICD-10-CM

## 2023-12-18 DIAGNOSIS — G43.B0 OPHTHALMOPLEGIC MIGRAINE, NOT INTRACTABLE: ICD-10-CM

## 2023-12-18 PROBLEM — I10 PRIMARY HYPERTENSION: Status: ACTIVE | Noted: 2023-12-18

## 2023-12-18 PROBLEM — Z87.59 HISTORY OF GESTATIONAL HYPERTENSION: Status: RESOLVED | Noted: 2018-02-20 | Resolved: 2023-12-18

## 2023-12-18 PROCEDURE — 3008F BODY MASS INDEX DOCD: CPT | Performed by: STUDENT IN AN ORGANIZED HEALTH CARE EDUCATION/TRAINING PROGRAM

## 2023-12-18 PROCEDURE — 3074F SYST BP LT 130 MM HG: CPT | Performed by: STUDENT IN AN ORGANIZED HEALTH CARE EDUCATION/TRAINING PROGRAM

## 2023-12-18 PROCEDURE — 3078F DIAST BP <80 MM HG: CPT | Performed by: STUDENT IN AN ORGANIZED HEALTH CARE EDUCATION/TRAINING PROGRAM

## 2023-12-18 PROCEDURE — 99213 OFFICE O/P EST LOW 20 MIN: CPT | Performed by: STUDENT IN AN ORGANIZED HEALTH CARE EDUCATION/TRAINING PROGRAM

## 2023-12-18 RX ORDER — ONDANSETRON 4 MG/1
4 TABLET, FILM COATED ORAL EVERY 8 HOURS PRN
Qty: 15 TABLET | Refills: 0 | Status: SHIPPED | OUTPATIENT
Start: 2023-12-18 | End: 2024-01-17

## 2023-12-29 DIAGNOSIS — I10 PRIMARY HYPERTENSION: Primary | ICD-10-CM

## 2024-01-02 RX ORDER — HYDROCHLOROTHIAZIDE 12.5 MG/1
12.5 TABLET ORAL DAILY
Qty: 90 TABLET | Refills: 0 | Status: SHIPPED | OUTPATIENT
Start: 2024-01-02

## 2024-01-12 ENCOUNTER — TELEMEDICINE (OUTPATIENT)
Dept: NEUROLOGY | Facility: CLINIC | Age: 29
End: 2024-01-12
Payer: COMMERCIAL

## 2024-01-12 DIAGNOSIS — G43.109 MIGRAINE WITH AURA AND WITHOUT STATUS MIGRAINOSUS, NOT INTRACTABLE: Primary | ICD-10-CM

## 2024-01-12 PROCEDURE — 99215 OFFICE O/P EST HI 40 MIN: CPT | Performed by: OTHER

## 2024-01-12 RX ORDER — ZONISAMIDE 50 MG/1
CAPSULE ORAL
Qty: 60 CAPSULE | Refills: 11 | Status: SHIPPED | OUTPATIENT
Start: 2024-01-12

## 2024-01-12 RX ORDER — RIZATRIPTAN BENZOATE 10 MG/1
TABLET ORAL
Qty: 12 TABLET | Refills: 11 | Status: SHIPPED | OUTPATIENT
Start: 2024-01-12

## 2024-01-12 NOTE — PROGRESS NOTES
Neurology Return Video History & Physical    CHIEF COMPLAINT:    Chief Complaint   Patient presents with    Headache     HISTORY OBTAINED FROM:  patient    HISTORY OF PRESENT ILLNESS:    Elizabeth Eli is a 28 year old female left handed with asthma (uses inhaler for cough in winter), migraines, initially daily when she met Dr. Holland in 2018.  Reduced to 4 per month on ZNS without side effects, also on rizatriptan PRN.    INTERIM HISTORY:  Headaches are worse, has medium ones 3-4 days a week, has more severe ones 1-2 per week.  Worse with weather change.  Starts with vision change or lightheadedness.  Gets nausea and rare vomiting, gets photo/phonophobia.    Takes rizatriptan once a week (may have to repeat it), it works most of the time (once a month it doesn't work, she has to lie down in the dark).  Takes ibuprofen 2-3 times a week most for headaches.  Takes zofran less than once a month, it is effective.    She also gets sharp electric shocks over the left parietal and crown regions, this is 3 times in the past month, only happening since the last severe migraines.    Stopped ZNS about Oct 2023.  She has in IUD, not planning on more children, has 5 kids ages 18 mo - 9 years.    PAST MEDICAL HISTORY:  Past Medical History:   Diagnosis Date    Abnormal uterine bleeding     spotting and bleeding X 2    Allergic rhinitis     Anxiety state, unspecified     Arthritis     Asthma     Bacterial vaginosis     Chronic back pain     Depression     Hx in high school; no medications    Gestational hypertension, third trimester 5/19/2022    Group B streptococcal infection 5/12/2022    Herpes     Takes daily Acyclovir; last outbreak 3 weeks ago; no active lesions, plan for vaginal delivery    Herpes 05/18/2022    taking acyclovir BID , states last outbreak 1 year ago     Miscarriage 2012    Pap smear for cervical cancer screening 10-27-14    wnl    Pneumonia due to organism     Pregnancy-induced hypertension     Hx PIH w/  1st two pregnancies, IOL @ 39.4 and 36.6 wks    S/P tonsillectomy      (spontaneous vaginal delivery) 2022       PAST SURGICAL HISTORY:  Past Surgical History:   Procedure Laterality Date    TONSILLECTOMY         SOCIAL HISTORY:  Social History     Socioeconomic History    Marital status:    Occupational History    Occupation: pharmacy tech   Tobacco Use    Smoking status: Former     Packs/day: 0.50     Years: 3.00     Additional pack years: 0.00     Total pack years: 1.50     Types: Cigarettes     Passive exposure: Never    Smokeless tobacco: Never    Tobacco comments:     quit in    Vaping Use    Vaping Use: Never used   Substance and Sexual Activity    Alcohol use: Never    Drug use: No    Sexual activity: Not Currently     Partners: Male   Other Topics Concern    Caffeine Concern Yes     Comment: 1 cup a day    Exercise Yes     Comment: walks    Seat Belt Yes       NEUROLOGICAL FAMILY HISTORY:  None    ALLERGIES:  Allergies   Allergen Reactions    Latex RASH       CURRENT MEDICATIONS:   zonisamide 50 MG Oral Cap Take 1 pill at bedtime for 2 weeks, then 1 pill twice a day 60 capsule 11    Rizatriptan Benzoate 10 MG Oral Tab use at onset; may repeat once after 2 hours- ONLY 2 IN 24 HOUR PERIOD MAX.  This is a 30 day supply. 12 tablet 11       REVIEW OF SYSTEMS:  Patient did not have additional neurological, psychiatric, respiratory, cardiovascular, gastrointestinal, or genitourinary symptoms.    PHYSICAL EXAMINATION:  LMP  (LMP Unknown)     Exam was limited as this was a video visit.    Gen: WDWN, in NAD  MSE: alert and oriented x 3, nl attn/conc, nl language  CN: EOMI, nl facial mvmt, nl palate, nl hearing, nl shoulder movement, nl tongue and voice by observation   Motor: moves all limbs antigravity, no drift or abnormal movements  Coord: no tremor or incoordination observed      DATA REVIEWED:  As documented in the HPI       ASSESSMENT & PLAN:      ICD-10-CM    1. Migraine with aura and  without status migrainosus, not intractable  G43.109 Rizatriptan Benzoate 10 MG Oral Tab        Migraines with aura, uncontrolled, due to stopping ZNS.  Restart ZNS.  Continue rizatriptan PRN.  I cautioned her on medication overuse headache, though I advised that she can take ibuprofen with rizatriptan at headache onset mike with headaches where she can predict that triptan is not going to be effective.    We discussed medication side effects. Patient verbalized understanding and agreement.    To summarize medical decision making:  I spent 44 minutes on the day of the encounter related to this visit, not including separately reported activities or procedures.    This visit was conducted as a two-way, real-time, interactive synchronous Video encounter.   The patient consents to both scheduling and proceeding with this virtual encounter, and understands this visit may be billed to their insurance carrier.    This has been done in good kerry to provide continuity of care in the best interest of the provider-patient relationship.  There are limitations of this visit, especially in physical examination, though every conscious effort was taken to allow for as appropriate care, time, and medical decision making as reasonably possible.        Return in about 4 months (around 5/12/2024).    Joaquin Miller MD, FAES  Board-Certified in Neurology, Epilepsy, and Clinical Neurophysiology  [facility]

## 2024-01-19 ENCOUNTER — OFFICE VISIT (OUTPATIENT)
Dept: INTERNAL MEDICINE CLINIC | Facility: CLINIC | Age: 29
End: 2024-01-19
Payer: COMMERCIAL

## 2024-01-19 VITALS
HEIGHT: 68.5 IN | WEIGHT: 293 LBS | BODY MASS INDEX: 43.9 KG/M2 | RESPIRATION RATE: 16 BRPM | DIASTOLIC BLOOD PRESSURE: 82 MMHG | HEART RATE: 86 BPM | SYSTOLIC BLOOD PRESSURE: 124 MMHG

## 2024-01-19 DIAGNOSIS — Z51.81 ENCOUNTER FOR THERAPEUTIC DRUG MONITORING: Primary | ICD-10-CM

## 2024-01-19 DIAGNOSIS — E66.01 CLASS 3 SEVERE OBESITY WITH SERIOUS COMORBIDITY AND BODY MASS INDEX (BMI) OF 45.0 TO 49.9 IN ADULT, UNSPECIFIED OBESITY TYPE (HCC): ICD-10-CM

## 2024-01-19 DIAGNOSIS — E88.810 METABOLIC SYNDROME: ICD-10-CM

## 2024-01-19 DIAGNOSIS — E88.819 INSULIN RESISTANCE: ICD-10-CM

## 2024-01-19 DIAGNOSIS — E78.5 HYPERLIPIDEMIA, UNSPECIFIED HYPERLIPIDEMIA TYPE: ICD-10-CM

## 2024-01-19 PROCEDURE — 99214 OFFICE O/P EST MOD 30 MIN: CPT | Performed by: PHYSICIAN ASSISTANT

## 2024-01-19 PROCEDURE — 3079F DIAST BP 80-89 MM HG: CPT | Performed by: PHYSICIAN ASSISTANT

## 2024-01-19 PROCEDURE — 3008F BODY MASS INDEX DOCD: CPT | Performed by: PHYSICIAN ASSISTANT

## 2024-01-19 PROCEDURE — 3074F SYST BP LT 130 MM HG: CPT | Performed by: PHYSICIAN ASSISTANT

## 2024-01-19 RX ORDER — TIRZEPATIDE 5 MG/.5ML
5 INJECTION, SOLUTION SUBCUTANEOUS WEEKLY
Qty: 2 ML | Refills: 0 | Status: SHIPPED | OUTPATIENT
Start: 2024-01-19

## 2024-01-19 NOTE — PROGRESS NOTES
HISTORY OF PRESENT ILLNESS  Chief Complaint   Patient presents with    Weight Check     +1       Elizabeth Eli is a 28 year old female here for follow up in medical weight loss program.   Up 1lb  No AOM  Denies chest pain, shortness of breath, dizziness, blurred vision, headache, paresthesia, nausea/vomiting.   Was started on Wegovy, but was unable to get medication  Joined gym and has a workout amaya, going in the morning before work  Doing cardio and strength training  Now logging foods - more conscious of what she is eating  Has cut back on soda, creamer and sugar  Nutrition: 24 hour food log reviewed, eating regular meals, +protein  Stress: 7/10  Sleep: 6 hours/night       Wt Readings from Last 6 Encounters:   01/19/24 (!) 329 lb (149.2 kg)   12/18/23 (!) 326 lb 3.2 oz (148 kg)   11/28/23 (!) 331 lb 9.6 oz (150.4 kg)   11/21/23 (!) 333 lb 12.8 oz (151.4 kg)   08/28/23 (!) 331 lb (150.1 kg)   07/24/23 (!) 329 lb (149.2 kg)            Breakfast Lunch Dinner Snacks Fluids   Reviewed           REVIEW OF SYSTEMS  GENERAL HEALTH: feels well otherwise, denied any fevers chills or night sweats   RESPIRATORY: denies shortness of breath   CARDIOVASCULAR: denies chest pain  GI: denies abdominal pain    EXAM  /82   Pulse 86   Resp 16   Ht 5' 8.5\" (1.74 m)   Wt (!) 329 lb (149.2 kg)   LMP  (LMP Unknown)   BMI 49.30 kg/m²   GENERAL: well developed, well nourished,in no apparent distress, A/O x3  SKIN: no rashes,no suspicious lesions  HEENT: atraumatic, normocephalic, OP-clear, PERRL  NECK: supple,no adenopathy  LUNGS: clear to auscultation bilaterally   CARDIO: RRR without murmur  GI: good BS's,NT/ND, no masses or HSM  EXTREMITIES: no cyanosis, no clubbing, no edema    Lab Results   Component Value Date    WBC 11.6 (H) 08/26/2023    RBC 4.13 08/26/2023    HGB 11.9 (L) 08/26/2023    HCT 36.1 08/26/2023    MCV 87.4 08/26/2023    MCH 28.8 08/26/2023    MCHC 33.0 08/26/2023    RDW 13.2 08/26/2023    .0  08/26/2023    MPV 10.4 10/22/2012     Lab Results   Component Value Date     (H) 08/26/2023    BUN 9 08/26/2023    BUNCREA 13.6 08/26/2023    CREATSERUM 0.66 08/26/2023    ANIONGAP 6 08/26/2023     10/09/2016    GFRNAA 120 05/25/2022    GFRAA 138 05/25/2022    CA 8.5 08/26/2023    OSMOCALC 289 08/26/2023    ALKPHO 80 08/26/2023    AST 11 (L) 08/26/2023    ALT 17 08/26/2023    BILT 0.3 08/26/2023    TP 6.5 08/26/2023    ALB 3.1 (L) 08/26/2023    GLOBULIN 3.4 08/26/2023     08/26/2023    K 4.1 08/26/2023     08/26/2023    CO2 25.0 08/26/2023     Lab Results   Component Value Date     08/09/2022    A1C 5.5 08/09/2022     Lab Results   Component Value Date    CHOLEST 141 08/26/2023    TRIG 247 (H) 08/26/2023    HDL 38 (L) 08/26/2023    LDL 63 08/26/2023    VLDL 37 (H) 08/26/2023    NONHDLC 103 08/26/2023     Lab Results   Component Value Date    TSH 1.270 08/26/2023     Lab Results   Component Value Date    B12 461 10/04/2022     Lab Results   Component Value Date    VITD 23.0 (L) 10/04/2022       Current Outpatient Medications on File Prior to Visit   Medication Sig Dispense Refill    zonisamide 50 MG Oral Cap Take 1 pill at bedtime for 2 weeks, then 1 pill twice a day 60 capsule 11    Rizatriptan Benzoate 10 MG Oral Tab use at onset; may repeat once after 2 hours- ONLY 2 IN 24 HOUR PERIOD MAX.  This is a 30 day supply. 12 tablet 11    fluticasone furoate-vilanterol 100-25 MCG/ACT Inhalation Aerosol Powder, Breath Activated Inhale 1 puff into the lungs daily. 180 each 0    albuterol (PROAIR HFA) 108 (90 Base) MCG/ACT Inhalation Aero Soln Inhale 2 puffs into the lungs every 4 (four) hours as needed for Wheezing. 1 each 0    triamcinolone 0.1 % External Cream Apply topically 2 (two) times daily as needed. 60 g 0    Meloxicam 15 MG Oral Tab Take 1 tablet (15 mg total) by mouth daily as needed for Pain. 90 tablet 0    ibuprofen (MOTRIN IB) 200 MG Oral Tab Take 4 tablets (800 mg total) by  mouth as needed for Pain.      cyclobenzaprine 5 MG Oral Tab Take 1-2 tablets (5-10 mg total) by mouth 3 (three) times daily as needed for Muscle spasms. 21 tablet 0    Levonorgestrel (MIRENA, 52 MG, IU) by Intrauterine route.      albuterol 108 (90 Base) MCG/ACT Inhalation Aero Soln Inhale 2 puffs into the lungs every 6 (six) hours as needed for Wheezing. 1 each 0    fluticasone propionate 50 MCG/ACT Nasal Suspension 2 sprays by Each Nare route daily. 16 g 0    acetaminophen 500 MG Oral Tab Take 1 tablet (500 mg total) by mouth every 6 (six) hours as needed for Pain.      valACYclovir 500 MG Oral Tab Take by mouth as needed.      cetirizine 10 MG Oral Tab Take 1 tablet (10 mg total) by mouth daily as needed.       No current facility-administered medications on file prior to visit.       ASSESSMENT  Analyzed weight data:       Diagnoses and all orders for this visit:    Encounter for therapeutic drug monitoring    Class 3 severe obesity with serious comorbidity and body mass index (BMI) of 45.0 to 49.9 in adult, unspecified obesity type (HCC)    Hyperlipidemia, unspecified hyperlipidemia type    Insulin resistance    Metabolic syndrome    Other orders  -     Tirzepatide-Weight Management (ZEPBOUND) 5 MG/0.5ML Subcutaneous Solution Auto-injector; Inject 5 mg into the skin once a week.  -     MOUNJARO 2.5 MG/0.5ML Subcutaneous Solution Pen-injector; Inject 2.5 mL into the skin once a week.        PLAN  Initial Weight: 328lbs  Initial Weight Date: 6/16/23  Today's Weight: 329lbs  5% Goal: 16.4lbs  10% Goal: 32.8lbs   Total Weight Loss: Up 1lb    Will begin Zepbound 2.5mg weekly - denies any personal or family history of pancreatitis, pancreatic cancer, thyroid cancer, MEN2 - discussed MOA, advised side effects and adverse effects of medication.  HLD - lifestyle changes  Insulin resistance - continue to work on low carb diet  Consistency with logging foods - protein and produce  Incorporate strength/resistance  training  Nutrition: low carb diet/ recommended to eat breakfast daily/ regular protein intake  Medication use and side effects reviewed with patient.  Medication contraindications: phentermine, topamax  Follow up with dietitian and psychologist as recommended.  Discussed the role of sleep and stress in weight management.  Counseled on comprehensive weight loss plan including attention to nutrition, exercise and behavior/stress management for success. See patient instruction below for more details.  Discussed strategies to overcome barriers to successful weight loss and weight maintenance  FITTE: ACSM recommendations (150-300 minutes/ week in active weight loss)   Total time spent on chart review, pre-charting, obtaining history, counseling, and educating, reviewing labs was 30 minutes.  Weight Loss consent to treat reviewed and signed       NOTE TO PATIENT: The 21st Century Cures Act makes clinical notes like these available to patients in the interest of transparency. Clinical notes are medical documents used by physicians and care providers to communicate with each other. These documents include medical language and terminology, abbreviations, and treatment information that may sound technical and at times possibly unfamiliar. In addition, at times, the verbiage may appear blunt or direct. These documents are one tool providers use to communicate relevant information and clinical opinions of the care providers in a way that allows common understanding of the clinical context.     There are no Patient Instructions on file for this visit.    No follow-ups on file.    Patient verbalizes understanding.    Nimco Mark PA-C  1/19/2024

## 2024-01-26 ENCOUNTER — PATIENT MESSAGE (OUTPATIENT)
Dept: INTERNAL MEDICINE CLINIC | Facility: CLINIC | Age: 29
End: 2024-01-26

## 2024-01-27 RX ORDER — TIRZEPATIDE 2.5 MG/.5ML
2.5 INJECTION, SOLUTION SUBCUTANEOUS WEEKLY
Qty: 2.5 ML | Refills: 0 | COMMUNITY
Start: 2024-01-27

## 2024-01-27 NOTE — TELEPHONE ENCOUNTER
From: Elizabeth Eli  To: Nimco Mark  Sent: 1/26/2024 4:29 PM CST  Subject: Medication     I just wanted to check in on the status of the PA for the RX that was ordered on the 19th.    Have a great weekend    Elizabteh Eli

## 2024-01-31 ENCOUNTER — PATIENT MESSAGE (OUTPATIENT)
Dept: INTERNAL MEDICINE CLINIC | Facility: CLINIC | Age: 29
End: 2024-01-31

## 2024-02-01 ENCOUNTER — TELEMEDICINE (OUTPATIENT)
Dept: INTERNAL MEDICINE CLINIC | Facility: CLINIC | Age: 29
End: 2024-02-01
Payer: COMMERCIAL

## 2024-02-01 DIAGNOSIS — J01.90 ACUTE BACTERIAL SINUSITIS: Primary | ICD-10-CM

## 2024-02-01 DIAGNOSIS — J02.9 SORE THROAT: ICD-10-CM

## 2024-02-01 DIAGNOSIS — B96.89 ACUTE BACTERIAL SINUSITIS: Primary | ICD-10-CM

## 2024-02-01 PROCEDURE — 99213 OFFICE O/P EST LOW 20 MIN: CPT | Performed by: STUDENT IN AN ORGANIZED HEALTH CARE EDUCATION/TRAINING PROGRAM

## 2024-02-01 RX ORDER — AMOXICILLIN 500 MG/1
500 CAPSULE ORAL 3 TIMES DAILY
Qty: 30 CAPSULE | Refills: 0 | Status: SHIPPED | OUTPATIENT
Start: 2024-02-01 | End: 2024-02-11

## 2024-02-01 NOTE — PROGRESS NOTES
Virtual Telephone Check-In    Elizabeth Eli verbally consents to a Virtual/Telephone Check-In visit on 02/01/24.  Patient has been referred to the [facility] website at [email or web address] to review the yearly Consent to Treat document.    Patient understands and accepts financial responsibility for any deductible, co-insurance and/or co-pays associated with this service.    CHIEF COMPLAINT:    Chief Complaint   Patient presents with    Sore Throat     Been going on for 2 weeks , swollen ,pain on /off congestion   Dayquil and halls helping          HPI & A/P: Audio and video visit done today.    The patient is a 28 year old female with pmhx of obesity, migraines, and HTN who presents with Sore throat and congestion.    //Acute bacterial sinusitis   2 weeks of sore throat and congestion. Blowing nose a lot. Color of discharge is clear to green. Sometimes having some bleeding. Swollen and painful throat. Feeling that her tonsils are inflamed and difficulty to swallow. 3 kids with ear infections and similar symptoms. All on antibiotics. Oldest daughter has sore throat as well, but negative for strep. Patient is not having ear pain. Limited coughing only when having a lot of sputum production. No fever. Taking dayquil.   PLAN:   Concern for possible bacterial sinusitis vs. Strep throat. Centor criteria 2. Ordered strep throat rapid to be done at lab. Ok to use tylenol or ibuprofen for sore throat, when not using meloxicam. Recommend flonase for nasal congestion. Denies ear pain, less concern for ear infection at this time. Physical exam could not be done due to virtual visit.   -Amoxicillin 500mg TID x 10 days. To call if not improving in 5 days.   -Flonase, tylenol PRN    Return if symptoms worsen or fail to improve.    Sandrine Noble MD  Internal Medicine     REVIEW OF SYSTEMS:  See HPI    Current Medications:    Current Outpatient Medications   Medication Sig Dispense Refill    amoxicillin 500 MG Oral Cap Take 1  capsule (500 mg total) by mouth 3 (three) times daily for 10 days. 30 capsule 0    MOUNJARO 2.5 MG/0.5ML Subcutaneous Solution Pen-injector Inject 2.5 mL into the skin once a week. 2.5 mL 0    Tirzepatide-Weight Management (ZEPBOUND) 5 MG/0.5ML Subcutaneous Solution Auto-injector Inject 5 mg into the skin once a week. 2 mL 0    zonisamide 50 MG Oral Cap Take 1 pill at bedtime for 2 weeks, then 1 pill twice a day 60 capsule 11    Rizatriptan Benzoate 10 MG Oral Tab use at onset; may repeat once after 2 hours- ONLY 2 IN 24 HOUR PERIOD MAX.  This is a 30 day supply. 12 tablet 11    fluticasone furoate-vilanterol 100-25 MCG/ACT Inhalation Aerosol Powder, Breath Activated Inhale 1 puff into the lungs daily. 180 each 0    albuterol (PROAIR HFA) 108 (90 Base) MCG/ACT Inhalation Aero Soln Inhale 2 puffs into the lungs every 4 (four) hours as needed for Wheezing. 1 each 0    triamcinolone 0.1 % External Cream Apply topically 2 (two) times daily as needed. 60 g 0    Meloxicam 15 MG Oral Tab Take 1 tablet (15 mg total) by mouth daily as needed for Pain. 90 tablet 0    ibuprofen (MOTRIN IB) 200 MG Oral Tab Take 4 tablets (800 mg total) by mouth as needed for Pain.      cyclobenzaprine 5 MG Oral Tab Take 1-2 tablets (5-10 mg total) by mouth 3 (three) times daily as needed for Muscle spasms. 21 tablet 0    Levonorgestrel (MIRENA, 52 MG, IU) by Intrauterine route.      albuterol 108 (90 Base) MCG/ACT Inhalation Aero Soln Inhale 2 puffs into the lungs every 6 (six) hours as needed for Wheezing. 1 each 0    fluticasone propionate 50 MCG/ACT Nasal Suspension 2 sprays by Each Nare route daily. 16 g 0    acetaminophen 500 MG Oral Tab Take 1 tablet (500 mg total) by mouth every 6 (six) hours as needed for Pain.      valACYclovir 500 MG Oral Tab Take by mouth as needed.      cetirizine 10 MG Oral Tab Take 1 tablet (10 mg total) by mouth daily as needed.         PAST MEDICAL, SOCIAL, AND FAMILY HISTORY:  Tobacco:    History   Smoking  Status    Former    Packs/day: 0.50    Years: 3.00    Types: Cigarettes   Smokeless Tobacco    Never       PHYSICAL EXAM:  There were no vitals taken for this visit.     DATA:  Results for orders placed or performed in visit on 08/26/23   Comp Metabolic Panel (14)   Result Value Ref Range    Glucose 108 (H) 70 - 99 mg/dL    Sodium 140 136 - 145 mmol/L    Potassium 4.1 3.5 - 5.1 mmol/L    Chloride 109 98 - 112 mmol/L    CO2 25.0 21.0 - 32.0 mmol/L    Anion Gap 6 0 - 18 mmol/L    BUN 9 7 - 18 mg/dL    Creatinine 0.66 0.55 - 1.02 mg/dL    BUN/CREA Ratio 13.6 10.0 - 20.0    Calcium, Total 8.5 8.5 - 10.1 mg/dL    Calculated Osmolality 289 275 - 295 mOsm/kg    eGFR-Cr 121 >=60 mL/min/1.73m2    ALT 17 13 - 56 U/L    AST 11 (L) 15 - 37 U/L    Alkaline Phosphatase 80 37 - 98 U/L    Bilirubin, Total 0.3 0.1 - 2.0 mg/dL    Total Protein 6.5 6.4 - 8.2 g/dL    Albumin 3.1 (L) 3.4 - 5.0 g/dL    Globulin  3.4 2.8 - 4.4 g/dL    A/G Ratio 0.9 (L) 1.0 - 2.0    Patient Fasting for CMP? Yes    Lipid Panel   Result Value Ref Range    Cholesterol, Total 141 <200 mg/dL    HDL Cholesterol 38 (L) 40 - 59 mg/dL    Triglycerides 247 (H) 30 - 149 mg/dL    LDL Cholesterol 63 <100 mg/dL    VLDL 37 (H) 0 - 30 mg/dL    Non HDL Chol 103 <130 mg/dL    Patient Fasting for Lipid? Yes    TSH W Reflex To Free T4   Result Value Ref Range    TSH 1.270 0.358 - 3.740 mIU/mL   CBC W/ DIFFERENTIAL   Result Value Ref Range    WBC 11.6 (H) 4.0 - 11.0 x10(3) uL    RBC 4.13 3.80 - 5.30 x10(6)uL    HGB 11.9 (L) 12.0 - 16.0 g/dL    HCT 36.1 35.0 - 48.0 %    MCV 87.4 80.0 - 100.0 fL    MCH 28.8 26.0 - 34.0 pg    MCHC 33.0 31.0 - 37.0 g/dL    RDW-SD 42.0 35.1 - 46.3 fL    RDW 13.2 11.0 - 15.0 %    .0 150.0 - 450.0 10(3)uL    Neutrophil Absolute Prelim 8.15 (H) 1.50 - 7.70 x10 (3) uL    Neutrophil Absolute 8.15 (H) 1.50 - 7.70 x10(3) uL    Lymphocyte Absolute 2.73 1.00 - 4.00 x10(3) uL    Monocyte Absolute 0.49 0.10 - 1.00 x10(3) uL    Eosinophil Absolute 0.15  0.00 - 0.70 x10(3) uL    Basophil Absolute 0.04 0.00 - 0.20 x10(3) uL    Immature Granulocyte Absolute 0.06 0.00 - 1.00 x10(3) uL    Neutrophil % 70.2 %    Lymphocyte % 23.5 %    Monocyte % 4.2 %    Eosinophil % 1.3 %    Basophil % 0.3 %    Immature Granulocyte % 0.5 %      Pt understands phone/video evaluation is not a substitute for face to face examination or emergency care. Pt advised to go to the ER or call 911 for worsening symptoms or acute distress.      Please note that the following visit was completed using two-way, real-time interactive audio and/or video communication.  This has been done in [place] to provide continuity of care in the best interest of the provider-patient relationship, due to the ongoing public health crisis/national emergency and because of restrictions of visitation.  There are limitations of this visit as no physical exam could be performed.  Every conscious effort was taken to allow for sufficient and adequate time.  This billing was spent on reviewing labs, medications, radiology tests and decision making.  Appropriate medical decision-making and tests are ordered as detailed in the plan of care above.

## 2024-02-02 ENCOUNTER — LAB ENCOUNTER (OUTPATIENT)
Dept: LAB | Age: 29
End: 2024-02-02
Attending: STUDENT IN AN ORGANIZED HEALTH CARE EDUCATION/TRAINING PROGRAM
Payer: COMMERCIAL

## 2024-02-02 DIAGNOSIS — J02.9 SORE THROAT: ICD-10-CM

## 2024-02-02 PROCEDURE — 87430 STREP A AG IA: CPT

## 2024-02-23 ENCOUNTER — PATIENT MESSAGE (OUTPATIENT)
Dept: INTERNAL MEDICINE CLINIC | Facility: CLINIC | Age: 29
End: 2024-02-23

## 2024-02-24 NOTE — TELEPHONE ENCOUNTER
From: Elizabeth Eli  To: Nimco Mark  Sent: 2/23/2024 3:48 PM CST  Subject: Medication    Hello. Can you send in the next dose to titrate up for the injection into the pharmacy.   Please and thank you!!!    Elizabeth Eli

## 2024-02-24 NOTE — TELEPHONE ENCOUNTER
Requesting   Zepbound incease    LOV: 1/19/24  RTC: not noted  Filled: 1/19/24 #2 with 0 refills    Future Appointments   Date Time Provider Department Center   3/19/2024  9:20 AM Nimco Mark PA-C [facility]   5/13/2024  5:00 PM Nimco Mark PA-C [facility]   8/12/2024  5:00 PM Nimco Mark PA-C [facility]   11/11/2024  5:00 PM Nimco Mark PA-C [facility]

## 2024-02-28 RX ORDER — TIRZEPATIDE 7.5 MG/.5ML
7.5 INJECTION, SOLUTION SUBCUTANEOUS WEEKLY
Qty: 2 ML | Refills: 0 | Status: SHIPPED | OUTPATIENT
Start: 2024-02-28

## 2024-02-29 ENCOUNTER — TELEMEDICINE (OUTPATIENT)
Dept: INTERNAL MEDICINE CLINIC | Facility: CLINIC | Age: 29
End: 2024-02-29
Payer: COMMERCIAL

## 2024-02-29 VITALS
WEIGHT: 293 LBS | TEMPERATURE: 98 F | DIASTOLIC BLOOD PRESSURE: 74 MMHG | HEART RATE: 78 BPM | SYSTOLIC BLOOD PRESSURE: 111 MMHG | BODY MASS INDEX: 47 KG/M2

## 2024-02-29 DIAGNOSIS — J01.00 ACUTE NON-RECURRENT MAXILLARY SINUSITIS: Primary | ICD-10-CM

## 2024-02-29 PROCEDURE — 99213 OFFICE O/P EST LOW 20 MIN: CPT | Performed by: NURSE PRACTITIONER

## 2024-02-29 RX ORDER — AMOXICILLIN AND CLAVULANATE POTASSIUM 875; 125 MG/1; MG/1
1 TABLET, FILM COATED ORAL 2 TIMES DAILY
Qty: 20 TABLET | Refills: 0 | Status: SHIPPED | OUTPATIENT
Start: 2024-02-29 | End: 2024-03-10

## 2024-02-29 RX ORDER — FLUTICASONE PROPIONATE 50 MCG
2 SPRAY, SUSPENSION (ML) NASAL DAILY
Qty: 1 EACH | Refills: 0 | Status: SHIPPED | OUTPATIENT
Start: 2024-02-29 | End: 2024-03-01

## 2024-02-29 NOTE — PROGRESS NOTES
Virtual video Check-In    Elizabeth Eli verbally consents to a Virtual/video Check-In visit on 02/29/24.  Patient has been referred to the [facility] website at [email or web address] to review the yearly Consent to Treat document.    Patient understands and accepts financial responsibility for any deductible, co-insurance and/or co-pays associated with this service.    Duration of the service: 15 minutes    Elizabeth Eli is a 29 year old female.  CHIEF COMPLAINT   Sinus issues    HPI:   Has had sinus pain and congestion for 1.5 weeks.  She has a lot of congestion.  Has an occasional cough with yellow phlegm.  No shortness of breath, HA, sore throat, fatigue, fever, chills, aches, poor appetite, nausea or vomiting, diarrhea.    She took a COVID test yesterday and it was negative.    Current Outpatient Medications   Medication Sig Dispense Refill    MOUNJARO 2.5 MG/0.5ML Subcutaneous Solution Pen-injector Inject 2.5 mL into the skin once a week. 2.5 mL 0    Tirzepatide-Weight Management (ZEPBOUND) 5 MG/0.5ML Subcutaneous Solution Auto-injector Inject 5 mg into the skin once a week. 2 mL 0    zonisamide 50 MG Oral Cap Take 1 pill at bedtime for 2 weeks, then 1 pill twice a day 60 capsule 11    Rizatriptan Benzoate 10 MG Oral Tab use at onset; may repeat once after 2 hours- ONLY 2 IN 24 HOUR PERIOD MAX.  This is a 30 day supply. 12 tablet 11    triamcinolone 0.1 % External Cream Apply topically 2 (two) times daily as needed. 60 g 0    Meloxicam 15 MG Oral Tab Take 1 tablet (15 mg total) by mouth daily as needed for Pain. 90 tablet 0    ibuprofen (MOTRIN IB) 200 MG Oral Tab Take 4 tablets (800 mg total) by mouth as needed for Pain.      cyclobenzaprine 5 MG Oral Tab Take 1-2 tablets (5-10 mg total) by mouth 3 (three) times daily as needed for Muscle spasms. 21 tablet 0    Levonorgestrel (MIRENA, 52 MG, IU) by Intrauterine route.      fluticasone propionate 50 MCG/ACT Nasal Suspension 2 sprays by Each Nare route daily.  16 g 0    acetaminophen 500 MG Oral Tab Take 1 tablet (500 mg total) by mouth every 6 (six) hours as needed for Pain.      valACYclovir 500 MG Oral Tab Take by mouth as needed.      cetirizine 10 MG Oral Tab Take 1 tablet (10 mg total) by mouth daily as needed.      Tirzepatide-Weight Management (ZEPBOUND) 7.5 MG/0.5ML Subcutaneous Solution Auto-injector Inject 7.5 mg into the skin once a week. (Patient not taking: Reported on 2024) 2 mL 0    fluticasone furoate-vilanterol 100-25 MCG/ACT Inhalation Aerosol Powder, Breath Activated Inhale 1 puff into the lungs daily. (Patient not taking: Reported on 2024) 180 each 0    albuterol (PROAIR HFA) 108 (90 Base) MCG/ACT Inhalation Aero Soln Inhale 2 puffs into the lungs every 4 (four) hours as needed for Wheezing. (Patient not taking: Reported on 2024) 1 each 0    albuterol 108 (90 Base) MCG/ACT Inhalation Aero Soln Inhale 2 puffs into the lungs every 6 (six) hours as needed for Wheezing. (Patient not taking: Reported on 2024) 1 each 0      Past Medical History:   Diagnosis Date    Abnormal uterine bleeding     spotting and bleeding X 2    Allergic rhinitis     Anxiety state, unspecified     Arthritis     Asthma (Formerly [facility])     Bacterial vaginosis     Chronic back pain     Depression     Hx in high school; no medications    Gestational hypertension, third trimester (Formerly [facility]) 2022    Group B streptococcal infection 2022    Herpes     Takes daily Acyclovir; last outbreak 3 weeks ago; no active lesions, plan for vaginal delivery    Herpes 2022    taking acyclovir BID , states last outbreak 1 year ago     Miscarriage (Formerly [facility]) 2012    Pap smear for cervical cancer screening 10-27-14    wnl    Pneumonia due to organism     Pregnancy-induced hypertension (Formerly [facility])     Hx PIH w/ 1st two pregnancies, IOL @ 39.4 and 36.6 wks    S/P tonsillectomy      (spontaneous vaginal delivery) (Formerly [facility]) 2022      Social History:  Social History     Socioeconomic History     Marital status:    Occupational History    Occupation: pharmacy tech   Tobacco Use    Smoking status: Former     Packs/day: 0.50     Years: 3.00     Additional pack years: 0.00     Total pack years: 1.50     Types: Cigarettes     Passive exposure: Never    Smokeless tobacco: Never    Tobacco comments:     quit in 2014   Vaping Use    Vaping Use: Never used   Substance and Sexual Activity    Alcohol use: Never    Drug use: No    Sexual activity: Not Currently     Partners: Male   Other Topics Concern    Caffeine Concern Yes     Comment: 1 cup a day    Exercise Yes     Comment: walks    Seat Belt Yes        REVIEW OF SYSTEMS:   See HPI    EXAM:   Limited due to video visit  GENERAL: does not sound like they are in any acute distress on the video  HEENT: Has sinus pain with palpation  LUNGS: They are able to phonate clearly without pausing due to sob, there was no coughing during the visit.  NEURO: Oriented times three      LABS:      Lab Results   Component Value Date    WBC 11.6 (H) 08/26/2023    RBC 4.13 08/26/2023    HGB 11.9 (L) 08/26/2023    HCT 36.1 08/26/2023    MCV 87.4 08/26/2023    MCH 28.8 08/26/2023    MCHC 33.0 08/26/2023    RDW 13.2 08/26/2023    .0 08/26/2023    MPV 10.4 10/22/2012      Lab Results   Component Value Date     (H) 08/26/2023    BUN 9 08/26/2023    BUNCREA 13.6 08/26/2023    CREATSERUM 0.66 08/26/2023    ANIONGAP 6 08/26/2023     10/09/2016    GFRNAA 120 05/25/2022    GFRAA 138 05/25/2022    CA 8.5 08/26/2023    OSMOCALC 289 08/26/2023    ALKPHO 80 08/26/2023    AST 11 (L) 08/26/2023    ALT 17 08/26/2023    BILT 0.3 08/26/2023    TP 6.5 08/26/2023    ALB 3.1 (L) 08/26/2023    GLOBULIN 3.4 08/26/2023     08/26/2023    K 4.1 08/26/2023     08/26/2023    CO2 25.0 08/26/2023      Lab Results   Component Value Date    CHOLEST 141 08/26/2023    TRIG 247 (H) 08/26/2023    HDL 38 (L) 08/26/2023    LDL 63 08/26/2023    VLDL 37 (H) 08/26/2023    NONHDLC 103  08/26/2023      Lab Results   Component Value Date    TSH 1.270 08/26/2023      Lab Results   Component Value Date     08/09/2022    A1C 5.5 08/09/2022        IMAGING:     No results found.     ASSESSMENT AND PLAN:   1. Acute non-recurrent maxillary sinusitis  -Likely sinusitis after an initial virus.  Was negative for COVID.  No shortness of breath.  -Start antibiotic.  See patient instructions  -To over-the-counter medicine for supportive care as well  -Good hydration  - amoxicillin clavulanate 875-125 MG Oral Tab; Take 1 tablet by mouth 2 (two) times daily for 10 days.  Dispense: 20 tablet; Refill: 0  - fluticasone propionate 50 MCG/ACT Nasal Suspension; 2 sprays by Each Nare route daily.  Dispense: 1 each; Refill: 0      The patient indicates understanding of these issues and agrees to the plan.  The patient is asked to return as needed or when routine care is due.         Please note that the following visit was completed using two-way, real-time interactive audio and/or video communication.  This has been done in [place] to provide continuity of care in the best interest of the provider-patient relationship, due to the ongoing public health crisis/national emergency and because of restrictions of visitation.  There are limitations of this visit as no physical exam could be performed.  Every conscious effort was taken to allow for sufficient and adequate time.  This billing was spent on reviewing labs, medications, radiology tests and decision making.  Appropriate medical decision-making and tests are ordered as detailed in the plan of care above.

## 2024-02-29 NOTE — PATIENT INSTRUCTIONS
Take antibiotic completely as ordered     Take antibiotic with food    Eat yogurt twice a day while on antibiotic or take an oral probiotic    Monitor for diarrhea, side effects, allergic reaction    If you have a mild allergic reaction take benadryl and call the office. If it is severe and you have lip or throat swelling or trouble breathing go to the ER or call 911    Also do Flonase, Mucinex DM or Robitussin DM, and good hydration    Medicine ball-peach tea, mint tea, citrus or lemon tea, honey, lemonade.    Do Listerine mouth washes a few times a day before or while sick.    Follow up in one week as needed or when routine care is due

## 2024-03-01 ENCOUNTER — TELEPHONE (OUTPATIENT)
Dept: INTERNAL MEDICINE CLINIC | Facility: CLINIC | Age: 29
End: 2024-03-01

## 2024-03-01 DIAGNOSIS — J01.00 ACUTE NON-RECURRENT MAXILLARY SINUSITIS: ICD-10-CM

## 2024-03-01 RX ORDER — FLUTICASONE PROPIONATE 50 MCG
2 SPRAY, SUSPENSION (ML) NASAL DAILY
Qty: 48 G | Refills: 0 | Status: SHIPPED | OUTPATIENT
Start: 2024-03-01

## 2024-03-13 ENCOUNTER — TELEPHONE (OUTPATIENT)
Dept: INTERNAL MEDICINE CLINIC | Facility: CLINIC | Age: 29
End: 2024-03-13

## 2024-03-13 NOTE — TELEPHONE ENCOUNTER
Physical 8/28/23. Last seen by Dr. Noble, last refilled Breo 11/29/2023 for Bronchitis.   Rx removed from medication list as this was just temporary. Patient on Generic utilization list.

## 2024-03-19 ENCOUNTER — TELEMEDICINE (OUTPATIENT)
Dept: INTERNAL MEDICINE CLINIC | Facility: CLINIC | Age: 29
End: 2024-03-19
Payer: COMMERCIAL

## 2024-03-19 DIAGNOSIS — E66.01 CLASS 3 SEVERE OBESITY WITH SERIOUS COMORBIDITY AND BODY MASS INDEX (BMI) OF 45.0 TO 49.9 IN ADULT, UNSPECIFIED OBESITY TYPE (HCC): ICD-10-CM

## 2024-03-19 DIAGNOSIS — Z51.81 ENCOUNTER FOR THERAPEUTIC DRUG MONITORING: Primary | ICD-10-CM

## 2024-03-19 DIAGNOSIS — E88.819 INSULIN RESISTANCE: ICD-10-CM

## 2024-03-19 DIAGNOSIS — E88.810 METABOLIC SYNDROME: ICD-10-CM

## 2024-03-19 DIAGNOSIS — E78.5 HYPERLIPIDEMIA, UNSPECIFIED HYPERLIPIDEMIA TYPE: ICD-10-CM

## 2024-03-19 PROCEDURE — 99213 OFFICE O/P EST LOW 20 MIN: CPT | Performed by: PHYSICIAN ASSISTANT

## 2024-03-19 RX ORDER — TIRZEPATIDE 10 MG/.5ML
10 INJECTION, SOLUTION SUBCUTANEOUS WEEKLY
Qty: 2 ML | Refills: 0 | Status: SHIPPED | OUTPATIENT
Start: 2024-03-19

## 2024-03-19 NOTE — PROGRESS NOTES
This visit is conducted using Telemedicine with live, interactive video and audio.    Patient has been referred to the [facility] website at [email or web address] to review the yearly Consent to Treat document.    Patient understands and accepts financial responsibility for any deductible, co-insurance and/or co-pays associated with this service.      HISTORY OF PRESENT ILLNESS  Chief Complaint   Patient presents with    Weight Check       Elizabeth Eli is a 29 year old female here for follow up in medical weight loss program.   307lbs  Pt is compliant on zepbound 7.5mg  Denies chest pain, shortness of breath, dizziness, blurred vision, headache, paresthesia, nausea/vomiting.   Feels like she is doing a lot better with food choices  Has cut down to one soda a week  Trying to focus on more protein, trying to get away from more pasta  Better mood when she goes to the gym    Exercise/Activity: at least 3 days a week, cardio and strength training  Nutrition: 24 hour food log reviewed, eating regular meals, +protein  Stress: 6/10  Sleep: has always been a struggle with kids        Wt Readings from Last 6 Encounters:   02/29/24 (!) 315 lb (142.9 kg)   01/19/24 (!) 329 lb (149.2 kg)   12/18/23 (!) 326 lb 3.2 oz (148 kg)   11/28/23 (!) 331 lb 9.6 oz (150.4 kg)   11/21/23 (!) 333 lb 12.8 oz (151.4 kg)   08/28/23 (!) 331 lb (150.1 kg)            Breakfast Lunch Dinner Snacks Fluids              REVIEW OF SYSTEMS  GENERAL HEALTH: feels well otherwise, denied any fevers chills or night sweats   RESPIRATORY: denies shortness of breath   CARDIOVASCULAR: denies chest pain  GI: denies abdominal pain    EXAM  There were no vitals taken for this visit.  GENERAL: well developed, well nourished,in no apparent distress, A/O x3  SKIN: no rashes,no suspicious lesions on visible skin  HEENT: atraumatic, normocephalic  LUNGS: no increased effort or work with breathing   NEURO: speaking fluently and in clear sentences    Lab Results    Component Value Date    WBC 11.6 (H) 08/26/2023    RBC 4.13 08/26/2023    HGB 11.9 (L) 08/26/2023    HCT 36.1 08/26/2023    MCV 87.4 08/26/2023    MCH 28.8 08/26/2023    MCHC 33.0 08/26/2023    RDW 13.2 08/26/2023    .0 08/26/2023    MPV 10.4 10/22/2012     Lab Results   Component Value Date     (H) 08/26/2023    BUN 9 08/26/2023    BUNCREA 13.6 08/26/2023    CREATSERUM 0.66 08/26/2023    ANIONGAP 6 08/26/2023     10/09/2016    GFRNAA 120 05/25/2022    GFRAA 138 05/25/2022    CA 8.5 08/26/2023    OSMOCALC 289 08/26/2023    ALKPHO 80 08/26/2023    AST 11 (L) 08/26/2023    ALT 17 08/26/2023    BILT 0.3 08/26/2023    TP 6.5 08/26/2023    ALB 3.1 (L) 08/26/2023    GLOBULIN 3.4 08/26/2023     08/26/2023    K 4.1 08/26/2023     08/26/2023    CO2 25.0 08/26/2023     Lab Results   Component Value Date     08/09/2022    A1C 5.5 08/09/2022     Lab Results   Component Value Date    CHOLEST 141 08/26/2023    TRIG 247 (H) 08/26/2023    HDL 38 (L) 08/26/2023    LDL 63 08/26/2023    VLDL 37 (H) 08/26/2023    NONHDLC 103 08/26/2023     Lab Results   Component Value Date    TSH 1.270 08/26/2023     Lab Results   Component Value Date    B12 461 10/04/2022     Lab Results   Component Value Date    VITD 23.0 (L) 10/04/2022       Current Outpatient Medications on File Prior to Visit   Medication Sig Dispense Refill    fluticasone propionate 50 MCG/ACT Nasal Suspension 2 sprays by Each Nare route daily. 48 g 0    Tirzepatide-Weight Management (ZEPBOUND) 7.5 MG/0.5ML Subcutaneous Solution Auto-injector Inject 7.5 mg into the skin once a week. (Patient not taking: Reported on 2/29/2024) 2 mL 0    MOUNJARO 2.5 MG/0.5ML Subcutaneous Solution Pen-injector Inject 2.5 mL into the skin once a week. 2.5 mL 0    Tirzepatide-Weight Management (ZEPBOUND) 5 MG/0.5ML Subcutaneous Solution Auto-injector Inject 5 mg into the skin once a week. 2 mL 0    zonisamide 50 MG Oral Cap Take 1 pill at bedtime for 2  weeks, then 1 pill twice a day 60 capsule 11    Rizatriptan Benzoate 10 MG Oral Tab use at onset; may repeat once after 2 hours- ONLY 2 IN 24 HOUR PERIOD MAX.  This is a 30 day supply. 12 tablet 11    triamcinolone 0.1 % External Cream Apply topically 2 (two) times daily as needed. 60 g 0    Meloxicam 15 MG Oral Tab Take 1 tablet (15 mg total) by mouth daily as needed for Pain. 90 tablet 0    ibuprofen (MOTRIN IB) 200 MG Oral Tab Take 4 tablets (800 mg total) by mouth as needed for Pain.      cyclobenzaprine 5 MG Oral Tab Take 1-2 tablets (5-10 mg total) by mouth 3 (three) times daily as needed for Muscle spasms. 21 tablet 0    Levonorgestrel (MIRENA, 52 MG, IU) by Intrauterine route.      acetaminophen 500 MG Oral Tab Take 1 tablet (500 mg total) by mouth every 6 (six) hours as needed for Pain.      valACYclovir 500 MG Oral Tab Take by mouth as needed.      cetirizine 10 MG Oral Tab Take 1 tablet (10 mg total) by mouth daily as needed.       No current facility-administered medications on file prior to visit.       ASSESSMENT  Analyzed weight data:       Diagnoses and all orders for this visit:    Encounter for therapeutic drug monitoring    Class 3 severe obesity with serious comorbidity and body mass index (BMI) of 45.0 to 49.9 in adult, unspecified obesity type (HCC)    Hyperlipidemia, unspecified hyperlipidemia type    Insulin resistance    Metabolic syndrome    Other orders  -     Tirzepatide-Weight Management (ZEPBOUND) 10 MG/0.5ML Subcutaneous Solution Auto-injector; Inject 10 mg into the skin once a week.        PLAN  Initial Weight: 328lbs  Initial Weight Date: 6/16/23  Today's Weight: 307lbs  5% Goal: 16.4lbs  10% Goal: 32.8lbs   Total Weight Loss: Net loss 21lb    Will continue and increase zepbound - advised side effects and adverse effects of medication   HLD - lifestyle changes  Insulin resistance - continue to work on lifestyle changes  Consistency with logging foods - protein and produce  Nutrition:  low carb diet/ recommended to eat breakfast daily/ regular protein intake  Medication use and side effects reviewed with patient.  Medication contraindications: phentermine, topamax   Follow up with dietitian and psychologist as recommended.  Discussed the role of sleep and stress in weight management.  Counseled on comprehensive weight loss plan including attention to nutrition, exercise and behavior/stress management for success. See patient instruction below for more details.  Discussed strategies to overcome barriers to successful weight loss and weight maintenance  FITTE: ACSM recommendations (150-300 minutes/ week in active weight loss)   Weight Loss consent to treat reviewed and signed     There are no Patient Instructions on file for this visit.    No follow-ups on file.    Patient verbalizes understanding.    Nimco Mark PA-C  3/19/2024    Please note that the following visit was completed using two-way, real-time interactive audio and video communication.  This has been done in [place] to provide continuity of care in the best interest of the provider-patient relationship, due to the ongoing public health crisis/national emergency and because of restrictions of visitation.  There are limitations of this visit as no physical exam could be performed.  Every conscious effort was taken to allow for sufficient and adequate time.  This billing was spent on reviewing labs, medications, radiology tests and decision making.  Appropriate medical decision-making and tests are ordered as detailed in the plan of care above    Nimco Mark PA-C

## 2024-04-15 ENCOUNTER — PATIENT MESSAGE (OUTPATIENT)
Dept: INTERNAL MEDICINE CLINIC | Facility: CLINIC | Age: 29
End: 2024-04-15

## 2024-04-16 RX ORDER — TIRZEPATIDE 12.5 MG/.5ML
12.5 INJECTION, SOLUTION SUBCUTANEOUS WEEKLY
Qty: 2 ML | Refills: 0 | Status: SHIPPED | OUTPATIENT
Start: 2024-04-16 | End: 2024-05-08

## 2024-04-16 NOTE — TELEPHONE ENCOUNTER
Please advise. Thank you.  Patient is requesting increase of zepbound from 10 mg to 12.5 mg.    Denies side effects  Current weight: 299 lbs  Last office visit: 3/19/24 (video visit)  Next office  visit: 5/13/24    Medication pended for review. Please approve if appropriate. Thank you.

## 2024-04-16 NOTE — TELEPHONE ENCOUNTER
From: Elizabeth Eli  To: Nimco Mark  Sent: 4/15/2024 7:21 PM CDT  Subject: Medication    Good evening.     I am writing in regards to the medicine looking to get a refill and I know we talked about the next dosage going up. I did find a pharmacy [facility] [place] has it in stock but he can't hold it so I'm hoping you can call in a script over to then.   This is for zepbound  Currently I'm on the 10mg and we talked about going up to the 12.5mg.     Thank you   Elizabeth Eli

## 2024-04-29 ENCOUNTER — OFFICE VISIT (OUTPATIENT)
Dept: INTERNAL MEDICINE CLINIC | Facility: CLINIC | Age: 29
End: 2024-04-29
Payer: COMMERCIAL

## 2024-04-29 VITALS
HEIGHT: 68.5 IN | DIASTOLIC BLOOD PRESSURE: 88 MMHG | HEART RATE: 72 BPM | BODY MASS INDEX: 43.9 KG/M2 | SYSTOLIC BLOOD PRESSURE: 120 MMHG | OXYGEN SATURATION: 97 % | RESPIRATION RATE: 14 BRPM | TEMPERATURE: 98 F | WEIGHT: 293 LBS

## 2024-04-29 DIAGNOSIS — J01.90 ACUTE BACTERIAL SINUSITIS: Primary | ICD-10-CM

## 2024-04-29 DIAGNOSIS — R05.8 PRODUCTIVE COUGH: ICD-10-CM

## 2024-04-29 DIAGNOSIS — B96.89 ACUTE BACTERIAL SINUSITIS: Primary | ICD-10-CM

## 2024-04-29 PROCEDURE — 3079F DIAST BP 80-89 MM HG: CPT | Performed by: NURSE PRACTITIONER

## 2024-04-29 PROCEDURE — 99213 OFFICE O/P EST LOW 20 MIN: CPT | Performed by: NURSE PRACTITIONER

## 2024-04-29 PROCEDURE — 3074F SYST BP LT 130 MM HG: CPT | Performed by: NURSE PRACTITIONER

## 2024-04-29 PROCEDURE — 3008F BODY MASS INDEX DOCD: CPT | Performed by: NURSE PRACTITIONER

## 2024-04-29 RX ORDER — AMOXICILLIN AND CLAVULANATE POTASSIUM 875; 125 MG/1; MG/1
1 TABLET, FILM COATED ORAL 2 TIMES DAILY
Qty: 20 TABLET | Refills: 0 | Status: SHIPPED | OUTPATIENT
Start: 2024-04-29 | End: 2024-05-09

## 2024-04-29 NOTE — PROGRESS NOTES
Elizabeth Eli is a 29 year old female.  CHIEF COMPLAINT   Multiple symptoms    HPI:   The patient started to have a cold about a week and a half ago. Her kids were also sick. Was with congestion, coughing. Had sinus pain and back of head was tender. HA as well. Did get better but is still with sinus congestion, productive cough. No sob, fever, chills.       Current Outpatient Medications   Medication Sig Dispense Refill    Tirzepatide-Weight Management (ZEPBOUND) 12.5 MG/0.5ML Subcutaneous Solution Auto-injector Inject 12.5 mg into the skin once a week for 4 doses. 2 mL 0    fluticasone propionate 50 MCG/ACT Nasal Suspension 2 sprays by Each Nare route daily. 48 g 0    zonisamide 50 MG Oral Cap Take 1 pill at bedtime for 2 weeks, then 1 pill twice a day 60 capsule 11    Rizatriptan Benzoate 10 MG Oral Tab use at onset; may repeat once after 2 hours- ONLY 2 IN 24 HOUR PERIOD MAX.  This is a 30 day supply. 12 tablet 11    triamcinolone 0.1 % External Cream Apply topically 2 (two) times daily as needed. 60 g 0    Meloxicam 15 MG Oral Tab Take 1 tablet (15 mg total) by mouth daily as needed for Pain. 90 tablet 0    ibuprofen (MOTRIN IB) 200 MG Oral Tab Take 4 tablets (800 mg total) by mouth as needed for Pain.      cyclobenzaprine 5 MG Oral Tab Take 1-2 tablets (5-10 mg total) by mouth 3 (three) times daily as needed for Muscle spasms. 21 tablet 0    Levonorgestrel (MIRENA, 52 MG, IU) by Intrauterine route.      acetaminophen 500 MG Oral Tab Take 1 tablet (500 mg total) by mouth every 6 (six) hours as needed for Pain.      valACYclovir 500 MG Oral Tab Take by mouth as needed.      cetirizine 10 MG Oral Tab Take 1 tablet (10 mg total) by mouth daily as needed.        Past Medical History:    Abnormal uterine bleeding    spotting and bleeding X 2    Allergic rhinitis    Anxiety state, unspecified    Arthritis    Asthma (HCC)    Bacterial vaginosis    Chronic back pain    Depression    Hx in high school; no medications     Gestational hypertension, third trimester ([facility])    Group B streptococcal infection    Herpes    Takes daily Acyclovir; last outbreak 3 weeks ago; no active lesions, plan for vaginal delivery    Herpes    taking acyclovir BID , states last outbreak 1 year ago     Miscarriage ([facility])    Pap smear for cervical cancer screening    wnl    Pneumonia due to organism    Pregnancy-induced hypertension ([facility])    Hx PIH w/ 1st two pregnancies, IOL @ 39.4 and 36.6 wks    S/P tonsillectomy     (spontaneous vaginal delivery) ([facility])      Social History:  Social History     Socioeconomic History    Marital status:    Occupational History    Occupation: pharmacy tech   Tobacco Use    Smoking status: Former     Current packs/day: 0.50     Average packs/day: 0.5 packs/day for 3.0 years (1.5 ttl pk-yrs)     Types: Cigarettes     Passive exposure: Never    Smokeless tobacco: Never    Tobacco comments:     quit in    Vaping Use    Vaping status: Never Used   Substance and Sexual Activity    Alcohol use: Never    Drug use: No    Sexual activity: Not Currently     Partners: Male   Other Topics Concern    Caffeine Concern Yes     Comment: 1 cup a day    Exercise Yes     Comment: walks    Seat Belt Yes        REVIEW OF SYSTEMS:   See HPI     EXAM:     /88 (BP Location: Left arm, Patient Position: Sitting, Cuff Size: large)   Pulse 72   Temp 97.9 °F (36.6 °C) (Temporal)   Resp 14   Ht 5' 8.5\" (1.74 m)   Wt 296 lb 3.2 oz (134.4 kg)   SpO2 97%   BMI 44.38 kg/m²   Body mass index is 44.38 kg/m².   GENERAL: well developed, well nourished,in no apparent distress  HEENT: atraumatic, normocephalic,ears are clear, sinus pressure  EYES: conjunctiva are clear  NECK: supple,no adenopathy    LUNGS: clear to auscultation; no rhonchi, rales, or wheezing  CARDIO: RRR without murmur  GI: no tenderness  MUSCULOSKELETAL: No obvious joint deformity or swelling.  Normal gait.  EXTREMITIES: no edema  NEURO: Oriented times three        LABS:      Lab Results   Component Value Date    WBC 11.6 (H) 08/26/2023    RBC 4.13 08/26/2023    HGB 11.9 (L) 08/26/2023    HCT 36.1 08/26/2023    MCV 87.4 08/26/2023    MCH 28.8 08/26/2023    MCHC 33.0 08/26/2023    RDW 13.2 08/26/2023    .0 08/26/2023    MPV 10.4 10/22/2012      Lab Results   Component Value Date     (H) 08/26/2023    BUN 9 08/26/2023    BUNCREA 13.6 08/26/2023    CREATSERUM 0.66 08/26/2023    ANIONGAP 6 08/26/2023     10/09/2016    GFRNAA 120 05/25/2022    GFRAA 138 05/25/2022    CA 8.5 08/26/2023    OSMOCALC 289 08/26/2023    ALKPHO 80 08/26/2023    AST 11 (L) 08/26/2023    ALT 17 08/26/2023    BILT 0.3 08/26/2023    TP 6.5 08/26/2023    ALB 3.1 (L) 08/26/2023    GLOBULIN 3.4 08/26/2023     08/26/2023    K 4.1 08/26/2023     08/26/2023    CO2 25.0 08/26/2023      Lab Results   Component Value Date    CHOLEST 141 08/26/2023    TRIG 247 (H) 08/26/2023    HDL 38 (L) 08/26/2023    LDL 63 08/26/2023    VLDL 37 (H) 08/26/2023    NONHDLC 103 08/26/2023      Lab Results   Component Value Date    TSH 1.270 08/26/2023      Lab Results   Component Value Date     08/09/2022    A1C 5.5 08/09/2022        ASSESSMENT AND PLAN:   1. Acute bacterial sinusitis  2. Productive cough  - the patient has a productive cough and sinus pain/congestion. No sob, fever, chills.  - start abt  - OTC meds for supportive care  - to ER for emergent symptoms as needed  - amoxicillin clavulanate 875-125 MG Oral Tab; Take 1 tablet by mouth 2 (two) times daily for 10 days.  Dispense: 20 tablet; Refill: 0        The patient indicates understanding of these issues and agrees to the plan.  The patient is asked to return as needed or when routine care is due.

## 2024-04-29 NOTE — PATIENT INSTRUCTIONS
Follow up in August for your annual physical or sooner as needed    Take antibiotic completely as ordered     Take antibiotic with food    Eat yogurt twice a day while on antibiotic or take an oral probiotic    Monitor for diarrhea, side effects, allergic reaction    If you have a mild allergic reaction take benadryl and call the office. If it is severe and you have lip or throat swelling or trouble breathing go to the ER or call 911    Follow up in one week as needed or when routine care is due     Keep well hydrated     You can use robitussin DM or mucinex DM as directed on the bottle for cough and chest congestion.      Use cepacol for sore throat and dry cough as needed.      Use tylenol as needed for pain or fever    Viral Upper Respiratory Illness (Adult)    You have a viral upper respiratory illness (URI), which is another term for the common cold. This illness is contagious during the first few days. It is spread through the air by coughing and sneezing. It may also be spread by direct contact (touching the sick person and then touching your own eyes, nose, or mouth). Frequent handwashing will decrease risk of spread. Most viral illnesses go away within 7 to 10 days with rest and simple home remedies. Sometimes the illness may last for several weeks. Antibiotics will not kill a virus, and they are generally not prescribed for this condition.  Home care  If symptoms are severe, rest at home for the first 2 to 3 days. When you resume activity, don't let yourself get too tired.  Don't smoke. If you need help stopping, talk with your healthcare provider.  Avoid being exposed to cigarette smoke (yours or others’).  You may use acetaminophen or ibuprofen to control pain and fever, unless another medicine was prescribed. If you have chronic liver or kidney disease, have ever had a stomach ulcer or gastrointestinal bleeding, or are taking blood-thinning medicines, talk with your healthcare provider before using  these medicines. Aspirin should never be given to anyone under 18 years of age who is ill with a viral infection or fever. It may cause severe liver or brain damage.  Your appetite may be poor, so a light diet is fine. Stay well hydrated by drinking 6 to 8 glasses of fluids per day (water, soft drinks, juices, tea, or soup). Extra fluids will help loosen secretions in the nose and lungs.  Over-the-counter cold medicines will not shorten the length of time you’re sick, but they may be helpful for the following symptoms: cough, sore throat, and nasal and sinus congestion. If you take prescription medicines, ask your healthcare provider or pharmacist which over-the-counter medicines are safe to use. (Note: Don't use decongestants if you have high blood pressure.)  Follow-up care  Follow up with your healthcare provider, or as advised.  When to seek medical advice  Call your healthcare provider right away if any of these occur:  Cough with lots of colored sputum (mucus)  Severe headache; face, neck, or ear pain  Difficulty swallowing due to throat pain  Fever of 100.4°F (38°C) or higher, or as directed by your healthcare provider  Call 911  Call 911 if any of these occur:  Chest pain, shortness of breath, wheezing, or difficulty breathing  Coughing up blood  Very severe pain with swallowing, especially if it goes along with a muffled voice   Date Last Reviewed: 6/1/2018  © 5982-7950 [employer]. [address] [place] [address], [place] [address]. All rights reserved. This information is not intended as a substitute for professional medical care. Always follow your healthcare professional's instructions.

## 2024-05-13 ENCOUNTER — PATIENT MESSAGE (OUTPATIENT)
Dept: INTERNAL MEDICINE CLINIC | Facility: CLINIC | Age: 29
End: 2024-05-13

## 2024-05-13 NOTE — TELEPHONE ENCOUNTER
Requesting zepbound  LOV: 3/19/24  RTC: not noted  Last Relevant Labs: na  Filled: 4/16/24 #2ml with 0 refills  zepbound 12.5 mg    8/12/2024  5:00 PM Nimco Mark PA-C [facility] [address]   11/11/2024  5:00 PM Nimco Mark PA-C [facility] [address]   2/17/2025  5:00 PM Nimco Mark PA-C [facility] [address]     Pt was cancelled by clinic for her appt today at 5 pm

## 2024-05-13 NOTE — TELEPHONE ENCOUNTER
From: Elizabeth Eli  To: Nimco Crum  Sent: 5/13/2024 10:05 AM CDT  Subject: Medication    Hello. My appt today was cancelled due to Dr crum having an emergency. I will need a refill in my script.   Zepbound 12.5 is what I'm on I do not know if I'm stating or moving up to zepbound 15.     My current weight is 290.     [facility] [place] does have both strengths in stock    Thank you   Elizabeth Eli

## 2024-05-15 RX ORDER — TIRZEPATIDE 15 MG/.5ML
15 INJECTION, SOLUTION SUBCUTANEOUS WEEKLY
Qty: 2 ML | Refills: 2 | Status: SHIPPED | OUTPATIENT
Start: 2024-05-15 | End: 2024-06-06

## 2024-06-07 ENCOUNTER — OFFICE VISIT (OUTPATIENT)
Dept: OBGYN CLINIC | Facility: CLINIC | Age: 29
End: 2024-06-07
Payer: COMMERCIAL

## 2024-06-07 VITALS
DIASTOLIC BLOOD PRESSURE: 76 MMHG | SYSTOLIC BLOOD PRESSURE: 122 MMHG | HEART RATE: 72 BPM | BODY MASS INDEX: 43 KG/M2 | WEIGHT: 284.19 LBS

## 2024-06-07 DIAGNOSIS — T83.32XA INTRAUTERINE CONTRACEPTIVE DEVICE THREADS LOST, INITIAL ENCOUNTER: ICD-10-CM

## 2024-06-07 DIAGNOSIS — Z01.419 ENCOUNTER FOR WELL WOMAN EXAM WITH ROUTINE GYNECOLOGICAL EXAM: Primary | ICD-10-CM

## 2024-06-07 DIAGNOSIS — Z30.431 ENCOUNTER FOR ROUTINE CHECKING OF INTRAUTERINE CONTRACEPTIVE DEVICE (IUD): ICD-10-CM

## 2024-06-07 DIAGNOSIS — Z12.4 SCREENING FOR CERVICAL CANCER: ICD-10-CM

## 2024-06-07 PROCEDURE — 87624 HPV HI-RISK TYP POOLED RSLT: CPT | Performed by: OBSTETRICS & GYNECOLOGY

## 2024-06-07 PROCEDURE — 99395 PREV VISIT EST AGE 18-39: CPT | Performed by: OBSTETRICS & GYNECOLOGY

## 2024-06-07 PROCEDURE — 3074F SYST BP LT 130 MM HG: CPT | Performed by: OBSTETRICS & GYNECOLOGY

## 2024-06-07 PROCEDURE — 88175 CYTOPATH C/V AUTO FLUID REDO: CPT | Performed by: OBSTETRICS & GYNECOLOGY

## 2024-06-07 PROCEDURE — 3078F DIAST BP <80 MM HG: CPT | Performed by: OBSTETRICS & GYNECOLOGY

## 2024-06-07 RX ORDER — TIRZEPATIDE 15 MG/.5ML
INJECTION, SOLUTION SUBCUTANEOUS
COMMUNITY

## 2024-06-07 NOTE — PROGRESS NOTES
[facility]  Obstetrics and Gynecology    Subjective:     Elizabeth Eli is a 29 year old  who presents for an annual gyn exam. Patient complaints include - none.    Patient's last menstrual period was 2024 (approximate).   Menarche: 9 (2024  7:28 AM)  Period Cycle (Days): irregular (2024  7:28 AM)  Period Duration (Days): 4 days (2024  7:28 AM)  Period Flow: light (2024  7:28 AM)  Use of Birth Control (if yes, specify type): Mirena IUD (2024  7:28 AM)  Hx Prior Abnormal Pap: No (2024  7:28 AM)  Pap Date: 10/11/22 (2024  7:28 AM)  Pap Result Notes: WNL (2024  7:28 AM)     Dyspareunia: No.    Difficulty with bowel or bladder function: No.   History of STDs: h/o HSV  Smoker: No.  Safe at home: Yes.  , Kids doing well - 5 between 2-10yo  Pharmacy tech at [employer]    Screening:  Pap smear: neg   Mammogram: 10/10/2023 - BI-RADS 1, done for family history  Colonoscopy: n/a -   DEXA: n/a No recommendations at this time     Review of Systems  Constitutional: Denies fever/chills, weight loss, fatigue, weakness, or sweating  HEENT: Denies headache, hearing loss or tinnitus, ear pain or discharge, nosebleeds, congestion, sore throat  Eye: Denies visual changes, eye pain or discharge or redness  Cardiovascular: Denies chest pain, palpitations  Pulmonary: Denies cough, shortness of breath, wheezing  Breast: denies breast pain or palpable mass, skin changes, nipple discharge  GI: Denies nausea, vomiting, diarrhea, constipation, heartburn, hemachezia  : Denies dysuria, urgency, frequency, hematuria, flank pain  Musculoskeletal: Denies myalgias, pain in neck or back or joints  Skin: Denies rash, itching  Endocrine: Denies easy bruising or bleeding, increased thirst  Neuro: Denies dizziness, paraesthesias, focal weakness, seizures, loss of consciousness  Psych: Denies depression, anxiety, suicidal ideations, hallucinations, insomnia     Past Medical History   Past  Medical History:    Abnormal uterine bleeding    spotting and bleeding X 2    Allergic rhinitis    Anxiety state, unspecified    Arthritis    Asthma ([facility])    Bacterial vaginosis    Chronic back pain    Depression    Hx in high school; no medications    Gestational hypertension, third trimester ([facility])    Group B streptococcal infection    Herpes    Takes daily Acyclovir; last outbreak 3 weeks ago; no active lesions, plan for vaginal delivery    Herpes    taking acyclovir BID , states last outbreak 1 year ago     Miscarriage ([facility])    Pap smear for cervical cancer screening    wnl    Pneumonia due to organism    Pregnancy-induced hypertension ([facility])    Hx PIH w/ 1st two pregnancies, IOL @ 39.4 and 36.6 wks    S/P tonsillectomy     (spontaneous vaginal delivery) ([facility])         Past Obstetric History   OB History    Para Term  AB Living   7 5 4 1 2 5   SAB IAB Ectopic Multiple Live Births   2 0 0 0 5      # Outcome Date GA Lbr Melquiades/2nd Weight Sex Type Anes PTL Lv   7 Term 22 37w4d 00:34 / 00:12 8 lb 11 oz (3.94 kg) M NORMAL SPONT EPI N EDUARDA   6 Term 20 39w0d 06:44 / 00:04 7 lb 7.9 oz (3.4 kg) F NORMAL SPONT None N EDUARDA   5 SAB 2019           4 Term 18 39w0d 06:20 / 00:18 8 lb 9 oz (3.885 kg) F NORMAL SPONT EPI N EDUARDA   3  10/09/16 36w6d 05:49 / 00:07 7 lb 11.1 oz (3.49 kg) F NORMAL SPONT EPI N EDUARDA      Complications: Group B beta strep +, PIH   2 Term 05/01/15 39w4d 05:41 / 00:22 8 lb 0.2 oz (3.635 kg) F NORMAL SPONT EPI N EDUARDA      Complications: Group B beta strep +, Meconium   1 SAB  10w0d             Birth Comments: complete       Past Surgical History   Past Surgical History:   Procedure Laterality Date    Tonsillectomy          Family History   Family History   Problem Relation Age of Onset    Breast Cancer Mother 32        EST    Stroke Mother     Cancer Mother         breast    Psychiatric Mother     Thyroid Disorder Mother     Psychiatric Father     Diabetes Maternal  Grandmother     Breast Cancer Maternal Grandmother 45        EST    Cancer Maternal Grandmother         uterine and pancreatic    Neurological Disorder Maternal Grandmother         ALS    Hypertension Maternal Grandfather     Diabetes Maternal Grandfather     Other (Prothrobin gene mutation factor 2) Paternal Grandmother     Breast Cancer Maternal Aunt 40        EST        Social History   Social History     Socioeconomic History    Marital status:    Occupational History    Occupation: pharmacy tech   Tobacco Use    Smoking status: Former     Current packs/day: 0.50     Average packs/day: 0.5 packs/day for 3.0 years (1.5 ttl pk-yrs)     Types: Cigarettes     Passive exposure: Never    Smokeless tobacco: Never    Tobacco comments:     quit in 2014   Vaping Use    Vaping status: Never Used   Substance and Sexual Activity    Alcohol use: Not Currently    Drug use: No    Sexual activity: Yes     Partners: Male     Birth control/protection: Mirena, I.U.D.   Other Topics Concern    Caffeine Concern Yes     Comment: 1 cup a day    Exercise Yes     Comment: walks    Seat Belt Yes        Medications   Current Outpatient Medications on File Prior to Visit   Medication Sig Dispense Refill    Tirzepatide-Weight Management (ZEPBOUND) 15 MG/0.5ML Subcutaneous Solution Auto-injector Inject into the skin.      fluticasone propionate 50 MCG/ACT Nasal Suspension 2 sprays by Each Nare route daily. 48 g 0    zonisamide 50 MG Oral Cap Take 1 pill at bedtime for 2 weeks, then 1 pill twice a day 60 capsule 11    Rizatriptan Benzoate 10 MG Oral Tab use at onset; may repeat once after 2 hours- ONLY 2 IN 24 HOUR PERIOD MAX.  This is a 30 day supply. 12 tablet 11    triamcinolone 0.1 % External Cream Apply topically 2 (two) times daily as needed. 60 g 0    Meloxicam 15 MG Oral Tab Take 1 tablet (15 mg total) by mouth daily as needed for Pain. 90 tablet 0    ibuprofen (MOTRIN IB) 200 MG Oral Tab Take 4 tablets (800 mg total) by mouth  as needed for Pain.      cyclobenzaprine 5 MG Oral Tab Take 1-2 tablets (5-10 mg total) by mouth 3 (three) times daily as needed for Muscle spasms. 21 tablet 0    Levonorgestrel (MIRENA, 52 MG, IU) by Intrauterine route.      acetaminophen 500 MG Oral Tab Take 1 tablet (500 mg total) by mouth every 6 (six) hours as needed for Pain.      valACYclovir 500 MG Oral Tab Take by mouth as needed.      cetirizine 10 MG Oral Tab Take 1 tablet (10 mg total) by mouth daily as needed.       No current facility-administered medications on file prior to visit.       Allergies   Allergies   Allergen Reactions    Latex RASH          Objective:     Vitals:    24 0731   BP: 122/76   Pulse: 72   Weight: 284 lb 3.2 oz (128.9 kg)       Body mass index is 42.58 kg/m².    GEN: AAOx3, NAD, appears well, appears stated age  HEENT: normocephalic, atraumatic, thyroid symmetric without enlargement or nodularity  BREAST: bilaterally symmetric with no palpable masses, no nipple discharge, no skin changes  CV: RRR  PULM: CTAB  ABD: soft, NT, ND, no rebound or guarding  EXT: no c/c/e or tenderness  NEURO: CN 2-12 grossly intact  PELVIC:   Vulva: NEFG.   Vagina: Estrogenized, physiologic discharge.    Cervix: No lesions or tenderness.  IUD strings not visualized.   Uterus: anteverted, 6 week size, firm, mobile, nontender.     Adnexa: No masses or tenderness.     Rectal: Anus and perineum are normal.    Chaperone offered and declined.     Assessment:     Elizabeth Eli is a 29 year old  seen for well-women gyn exam.    Plan:     -- cervical cancer screening: Pap with HPV co-testing done today. Counseled regarding Gardasil vaccine.  -- breast cancer screening: continue annual CBE, start annual mammograms at 41yo  -- STD screening ordered: No  -- Contraception: Mirena IUD, will confirm in place with US, recommend condoms until then  -- Discussed further preventative care with PCP, staying up to date with screening and vaccinations, and  maintaining healthy diet and exercise.      -- Follow up in 1 year for annual exam or sooner as needed    Nelia Ochoa MD  [facility]  6/7/2024 7:39 AM

## 2024-06-10 LAB — HPV E6+E7 MRNA CVX QL NAA+PROBE: NEGATIVE

## 2024-06-11 ENCOUNTER — ULTRASOUND ENCOUNTER (OUTPATIENT)
Dept: OBGYN CLINIC | Facility: CLINIC | Age: 29
End: 2024-06-11

## 2024-06-11 DIAGNOSIS — T83.32XA INTRAUTERINE CONTRACEPTIVE DEVICE THREADS LOST, INITIAL ENCOUNTER: ICD-10-CM

## 2024-06-11 LAB
.: NORMAL
.: NORMAL

## 2024-06-11 PROCEDURE — 76856 US EXAM PELVIC COMPLETE: CPT | Performed by: OBSTETRICS & GYNECOLOGY

## 2024-06-11 PROCEDURE — 76830 TRANSVAGINAL US NON-OB: CPT | Performed by: OBSTETRICS & GYNECOLOGY

## 2024-07-29 NOTE — TELEPHONE ENCOUNTER
Requesting   Requested Prescriptions     Pending Prescriptions Disp Refills    ZEPBOUND 15 MG/0.5ML Subcutaneous Solution Auto-injector [Pharmacy Med Name: Zepbound 15 MG/0.5ML Subcutaneous Solution Auto-injector] 4 mL 0     Sig: ADMINISTER 15 MG UNDER THE SKIN 1 TIME A WEEK FOR 4 DOSES      LOV: 03/19/24  RTC:   Last Relevant Labs:   Filled: 05/15/24 #2ml with 2 refills    Future Appointments   Date Time Provider Department Center   8/12/2024  5:00 PM Nimco Mark PA-C [facility] [address]   9/7/2024  8:00 AM Sammi Witt APRN [facility] [address]   11/11/2024  5:00 PM Nimco Mark PA-C [facility] [address]   2/17/2025  5:00 PM Nimco Mark PA-C [facility] [address]   5/12/2025  5:00 PM Nimco Mark PA-C [facility] [address]

## 2024-07-31 RX ORDER — TIRZEPATIDE 15 MG/.5ML
INJECTION, SOLUTION SUBCUTANEOUS
Qty: 4 ML | Refills: 0 | Status: SHIPPED | OUTPATIENT
Start: 2024-07-31

## 2024-08-12 ENCOUNTER — OFFICE VISIT (OUTPATIENT)
Facility: CLINIC | Age: 29
End: 2024-08-12
Payer: COMMERCIAL

## 2024-08-12 VITALS
HEART RATE: 74 BPM | HEIGHT: 68.5 IN | SYSTOLIC BLOOD PRESSURE: 120 MMHG | RESPIRATION RATE: 18 BRPM | DIASTOLIC BLOOD PRESSURE: 84 MMHG | WEIGHT: 263 LBS | BODY MASS INDEX: 39.4 KG/M2

## 2024-08-12 DIAGNOSIS — E78.5 HYPERLIPIDEMIA, UNSPECIFIED HYPERLIPIDEMIA TYPE: ICD-10-CM

## 2024-08-12 DIAGNOSIS — E88.819 INSULIN RESISTANCE: ICD-10-CM

## 2024-08-12 DIAGNOSIS — E66.9 CLASS 2 OBESITY WITH BODY MASS INDEX (BMI) OF 39.0 TO 39.9 IN ADULT, UNSPECIFIED OBESITY TYPE, UNSPECIFIED WHETHER SERIOUS COMORBIDITY PRESENT: ICD-10-CM

## 2024-08-12 DIAGNOSIS — E88.810 METABOLIC SYNDROME: ICD-10-CM

## 2024-08-12 DIAGNOSIS — G43.909 MIGRAINE WITHOUT STATUS MIGRAINOSUS, NOT INTRACTABLE, UNSPECIFIED MIGRAINE TYPE: ICD-10-CM

## 2024-08-12 DIAGNOSIS — Z51.81 ENCOUNTER FOR THERAPEUTIC DRUG MONITORING: Primary | ICD-10-CM

## 2024-08-12 PROCEDURE — 3008F BODY MASS INDEX DOCD: CPT | Performed by: PHYSICIAN ASSISTANT

## 2024-08-12 PROCEDURE — 99214 OFFICE O/P EST MOD 30 MIN: CPT | Performed by: PHYSICIAN ASSISTANT

## 2024-08-12 PROCEDURE — 3074F SYST BP LT 130 MM HG: CPT | Performed by: PHYSICIAN ASSISTANT

## 2024-08-12 PROCEDURE — 3079F DIAST BP 80-89 MM HG: CPT | Performed by: PHYSICIAN ASSISTANT

## 2024-08-12 NOTE — PROGRESS NOTES
HISTORY OF PRESENT ILLNESS  Chief Complaint   Patient presents with    Weight Check     -44lbs       Elizabeth Eli is a 29 year old female here for follow up in medical weight loss program.   -44lbs  Compliant on zepbound  Denies chest pain, shortness of breath, dizziness, blurred vision, headache, paresthesia, nausea/vomiting.   Has seen an improvement in migraines  Cut back on fast food, coffee pasta  Trying to get more protein    Exercise/Activity: 3-4 days a week, cardio, yoga, walking  Nutrition: 24 hour food log reviewed, eating regular meals, +protein  Stress: higher,  lost his job, camping, kids are going back to school  Sleep: 6-8 hours/night, better than she was before       Wt Readings from Last 6 Encounters:   08/12/24 263 lb (119.3 kg)   06/07/24 284 lb 3.2 oz (128.9 kg)   04/29/24 296 lb 3.2 oz (134.4 kg)   02/29/24 (!) 315 lb (142.9 kg)   01/19/24 (!) 329 lb (149.2 kg)   12/18/23 (!) 326 lb 3.2 oz (148 kg)            Breakfast Lunch Dinner Snacks Fluids   Reviewed           REVIEW OF SYSTEMS  GENERAL HEALTH: feels well otherwise, denied any fevers chills or night sweats   RESPIRATORY: denies shortness of breath   CARDIOVASCULAR: denies chest pain  GI: denies abdominal pain    EXAM  /84   Pulse 74   Resp 18   Ht 5' 8.5\" (1.74 m)   Wt 263 lb (119.3 kg)   LMP 05/19/2024 (Approximate)   BMI 39.41 kg/m²   GENERAL: well developed, well nourished,in no apparent distress, A/O x3  SKIN: no rashes,no suspicious lesions  HEENT: atraumatic, normocephalic, OP-clear, PERRL  NECK: supple,no adenopathy  LUNGS: clear to auscultation bilaterally   CARDIO: RRR without murmur  GI: good BS's,NT/ND, no masses or HSM  EXTREMITIES: no cyanosis, no clubbing, no edema    Lab Results   Component Value Date    WBC 11.6 (H) 08/26/2023    RBC 4.13 08/26/2023    HGB 11.9 (L) 08/26/2023    HCT 36.1 08/26/2023    MCV 87.4 08/26/2023    MCH 28.8 08/26/2023    MCHC 33.0 08/26/2023    RDW 13.2 08/26/2023    PLT  241.0 08/26/2023    MPV 10.4 10/22/2012     Lab Results   Component Value Date    GLU 81 08/17/2024    BUN 11 08/17/2024    BUNCREA 14.5 08/17/2024    CREATSERUM 0.76 08/17/2024    ANIONGAP 6 08/17/2024     10/09/2016    GFRNAA 120 05/25/2022    GFRAA 138 05/25/2022    CA 8.7 08/17/2024    OSMOCALC 292 08/17/2024    ALKPHO 80 08/26/2023    AST 11 (L) 08/26/2023    ALT 17 08/26/2023    BILT 0.3 08/26/2023    TP 6.5 08/26/2023    ALB 3.1 (L) 08/26/2023    GLOBULIN 3.4 08/26/2023     08/17/2024    K 4.0 08/17/2024     08/17/2024    CO2 25.0 08/17/2024     Lab Results   Component Value Date     08/09/2022    A1C 5.5 08/09/2022     Lab Results   Component Value Date    CHOLEST 141 08/26/2023    TRIG 247 (H) 08/26/2023    HDL 38 (L) 08/26/2023    LDL 63 08/26/2023    VLDL 37 (H) 08/26/2023    NONHDLC 103 08/26/2023     Lab Results   Component Value Date    TSH 1.270 08/26/2023     Lab Results   Component Value Date    B12 461 10/04/2022     Lab Results   Component Value Date    VITD 23.0 (L) 10/04/2022       Current Outpatient Medications on File Prior to Visit   Medication Sig Dispense Refill    ZEPBOUND 15 MG/0.5ML Subcutaneous Solution Auto-injector ADMINISTER 15 MG UNDER THE SKIN 1 TIME A WEEK FOR 4 DOSES 4 mL 0    fluticasone propionate 50 MCG/ACT Nasal Suspension 2 sprays by Each Nare route daily. 48 g 0    Rizatriptan Benzoate 10 MG Oral Tab use at onset; may repeat once after 2 hours- ONLY 2 IN 24 HOUR PERIOD MAX.  This is a 30 day supply. 12 tablet 11    ibuprofen (MOTRIN IB) 200 MG Oral Tab Take 4 tablets (800 mg total) by mouth as needed for Pain.      Levonorgestrel (MIRENA, 52 MG, IU) by Intrauterine route.      acetaminophen 500 MG Oral Tab Take 1 tablet (500 mg total) by mouth every 6 (six) hours as needed for Pain.      valACYclovir 500 MG Oral Tab Take by mouth as needed.      cetirizine 10 MG Oral Tab Take 1 tablet (10 mg total) by mouth daily as needed.      zonisamide 50 MG  Oral Cap Take 1 pill at bedtime for 2 weeks, then 1 pill twice a day (Patient not taking: Reported on 8/12/2024) 60 capsule 11    triamcinolone 0.1 % External Cream Apply topically 2 (two) times daily as needed. (Patient not taking: Reported on 8/12/2024) 60 g 0    Meloxicam 15 MG Oral Tab Take 1 tablet (15 mg total) by mouth daily as needed for Pain. (Patient not taking: Reported on 8/12/2024) 90 tablet 0    cyclobenzaprine 5 MG Oral Tab Take 1-2 tablets (5-10 mg total) by mouth 3 (three) times daily as needed for Muscle spasms. (Patient not taking: Reported on 8/12/2024) 21 tablet 0     No current facility-administered medications on file prior to visit.       ASSESSMENT  Analyzed weight data:       Diagnoses and all orders for this visit:    Encounter for therapeutic drug monitoring    Class 2 obesity with body mass index (BMI) of 39.0 to 39.9 in adult, unspecified obesity type, unspecified whether serious comorbidity present    Hyperlipidemia, unspecified hyperlipidemia type    Insulin resistance    Metabolic syndrome    Migraine without status migrainosus, not intractable, unspecified migraine type        PLAN  Initial Weight: 328lbs  Initial Weight Date: 6/16/23  Today's Weight: 263lbs  5% Goal: 16.4lbs  10% Goal: 32.8lbs   Total Weight Loss: -44lbs/Net loss 65lb    Will continue zepbound - advised side effects and adverse effects of medication   Migraines - has noticed an improvement with lifestyle changes   HLD - lifestyle changes  Insulin resistance - continue current medications, low carb diet  Consistency with logging foods - protein and produce  Doing great with exercise  Nutrition: low carb diet/ recommended to eat breakfast daily/ regular protein intake  Medication use and side effects reviewed with patient.  Medication contraindications: phentermine, topamax  Follow up with dietitian and psychologist as recommended.  Discussed the role of sleep and stress in weight management.  Counseled on  comprehensive weight loss plan including attention to nutrition, exercise and behavior/stress management for success. See patient instruction below for more details.  Discussed strategies to overcome barriers to successful weight loss and weight maintenance  FITTE: ACSM recommendations (150-300 minutes/ week in active weight loss)   Weight Loss consent to treat reviewed and signed       NOTE TO PATIENT: The 21st Century Cures Act makes clinical notes like these available to patients in the interest of transparency. Clinical notes are medical documents used by physicians and care providers to communicate with each other. These documents include medical language and terminology, abbreviations, and treatment information that may sound technical and at times possibly unfamiliar. In addition, at times, the verbiage may appear blunt or direct. These documents are one tool providers use to communicate relevant information and clinical opinions of the care providers in a way that allows common understanding of the clinical context.     There are no Patient Instructions on file for this visit.    No follow-ups on file.    Patient verbalizes understanding.    Nimco Mark PA-C  8/12/2024

## 2024-08-13 ENCOUNTER — PATIENT MESSAGE (OUTPATIENT)
Facility: CLINIC | Age: 29
End: 2024-08-13

## 2024-08-13 NOTE — TELEPHONE ENCOUNTER
Future Appointments   Date Time Provider Department Center   8/17/2024  8:00 AM [facility] [address] TH[address] [facility]   9/7/2024  8:00 AM Sammi Witt APRN [facility] [address] [facility]   11/26/2024 10:20 AM Nimco Mark PA-C [facility] [address]   2/17/2025  5:00 PM Nimco Mark PA-C [facility] [address]   5/12/2025  5:00 PM Nimco Mark PA-C [facility] [address]   8/11/2025  5:00 PM Nimco Mark PA-C [facility] [address]

## 2024-08-13 NOTE — TELEPHONE ENCOUNTER
Please advise provider, thank you in advance    Future Appointments   Date Time Provider Department Center   8/17/2024  8:00 AM [facility] [address] [facility] [facility]   9/7/2024  8:00 AM Sammi Witt APRN [facility] [address] [facility] [address]   2/17/2025  5:00 PM Nimco Mark PA-C [facility] [address]   5/12/2025  5:00 PM Nimco Mark PA-C [facility] [address]   8/11/2025  5:00 PM Nimco Mark PA-C [facility] [address]

## 2024-08-13 NOTE — TELEPHONE ENCOUNTER
From: Elizabeth Eli  To: Nimco Mark  Sent: 8/13/2024 11:09 AM CDT  Subject: Video visit    Hi  showing that my appt in november was cancelled you do not jave anymore late appts that i can see. Are you able to do a vido visit for november?     Pablitobk janette Eli

## 2024-08-17 ENCOUNTER — LAB ENCOUNTER (OUTPATIENT)
Dept: LAB | Age: 29
End: 2024-08-17
Attending: NURSE PRACTITIONER
Payer: COMMERCIAL

## 2024-08-17 DIAGNOSIS — R03.0 ELEVATED BP WITHOUT DIAGNOSIS OF HYPERTENSION: ICD-10-CM

## 2024-08-17 LAB
ANION GAP SERPL CALC-SCNC: 6 MMOL/L (ref 0–18)
BUN BLD-MCNC: 11 MG/DL (ref 9–23)
BUN/CREAT SERPL: 14.5 (ref 10–20)
CALCIUM BLD-MCNC: 8.7 MG/DL (ref 8.7–10.4)
CHLORIDE SERPL-SCNC: 111 MMOL/L (ref 98–112)
CO2 SERPL-SCNC: 25 MMOL/L (ref 21–32)
CREAT BLD-MCNC: 0.76 MG/DL
EGFRCR SERPLBLD CKD-EPI 2021: 107 ML/MIN/1.73M2 (ref 60–?)
FASTING STATUS PATIENT QL REPORTED: YES
GLUCOSE BLD-MCNC: 81 MG/DL (ref 70–99)
OSMOLALITY SERPL CALC.SUM OF ELEC: 292 MOSM/KG (ref 275–295)
POTASSIUM SERPL-SCNC: 4 MMOL/L (ref 3.5–5.1)
SODIUM SERPL-SCNC: 142 MMOL/L (ref 136–145)

## 2024-08-17 PROCEDURE — 36415 COLL VENOUS BLD VENIPUNCTURE: CPT

## 2024-08-17 PROCEDURE — 80048 BASIC METABOLIC PNL TOTAL CA: CPT

## 2024-08-25 ENCOUNTER — PATIENT MESSAGE (OUTPATIENT)
Dept: INTERNAL MEDICINE CLINIC | Facility: CLINIC | Age: 29
End: 2024-08-25

## 2024-08-25 DIAGNOSIS — Z13.220 SCREENING FOR CHOLESTEROL LEVEL: ICD-10-CM

## 2024-08-25 DIAGNOSIS — Z00.00 PHYSICAL EXAM: ICD-10-CM

## 2024-08-25 DIAGNOSIS — Z13.29 SCREENING FOR THYROID DISORDER: Primary | ICD-10-CM

## 2024-08-26 NOTE — TELEPHONE ENCOUNTER
From: Elizabeth Eli  To: Sammi Witt  Sent: 8/25/2024 9:58 PM CDT  Subject: Upcoming appt    Ton Brooks,    I was wondering i have an appt with you on the 7th and was wondering if you could order my blook work now so we can go over when we get together.    I know lat time you ordered   CBC w/differental  TSH w refkex to free T4  Lipid panal  Comp metabolic panel    If you could put the orders in so we can go over them would be greatly appreciated.    Thank you  Elizabeth Eli

## 2024-08-27 NOTE — TELEPHONE ENCOUNTER
Requesting   Requested Prescriptions     Pending Prescriptions Disp Refills    Tirzepatide-Weight Management (ZEPBOUND) 15 MG/0.5ML Subcutaneous Solution Auto-injector 4 mL 0      LOV: 08/12/24  RTC: 3mo  Last Relevant Labs:   Filled: 07/31/24 #4ml with 0 refills    Future Appointments   Date Time Provider Department Center   9/7/2024  8:00 AM Sammi Witt APRN [facility] [address] [facility] [address]   11/26/2024 10:20 AM Nimco Mark PA-C [facility] [address]   2/17/2025  5:00 PM Nimco Mark PA-C [facility] [address]   5/12/2025  5:00 PM Nimco Mark PA-C [facility] [address]   8/11/2025  5:00 PM Nimco Mark PA-C [facility] [address]

## 2024-08-28 RX ORDER — TIRZEPATIDE 15 MG/.5ML
15 INJECTION, SOLUTION SUBCUTANEOUS WEEKLY
Qty: 6 ML | Refills: 0 | Status: SHIPPED | OUTPATIENT
Start: 2024-08-28

## 2024-08-31 ENCOUNTER — LAB ENCOUNTER (OUTPATIENT)
Dept: LAB | Facility: REFERENCE LAB | Age: 29
End: 2024-08-31
Attending: NURSE PRACTITIONER
Payer: COMMERCIAL

## 2024-08-31 DIAGNOSIS — Z13.29 SCREENING FOR THYROID DISORDER: ICD-10-CM

## 2024-08-31 DIAGNOSIS — Z00.00 PHYSICAL EXAM: ICD-10-CM

## 2024-08-31 DIAGNOSIS — Z13.220 SCREENING FOR CHOLESTEROL LEVEL: ICD-10-CM

## 2024-08-31 LAB
ALBUMIN SERPL-MCNC: 4.2 G/DL (ref 3.2–4.8)
ALBUMIN/GLOB SERPL: 1.6 {RATIO} (ref 1–2)
ALP LIVER SERPL-CCNC: 59 U/L
ALT SERPL-CCNC: <7 U/L
ANION GAP SERPL CALC-SCNC: 8 MMOL/L (ref 0–18)
AST SERPL-CCNC: 13 U/L (ref ?–34)
BASOPHILS # BLD AUTO: 0.03 X10(3) UL (ref 0–0.2)
BASOPHILS NFR BLD AUTO: 0.4 %
BILIRUB SERPL-MCNC: 0.4 MG/DL (ref 0.3–1.2)
BUN BLD-MCNC: 12 MG/DL (ref 9–23)
BUN/CREAT SERPL: 13.2 (ref 10–20)
CALCIUM BLD-MCNC: 9.2 MG/DL (ref 8.7–10.4)
CHLORIDE SERPL-SCNC: 111 MMOL/L (ref 98–112)
CHOLEST SERPL-MCNC: 131 MG/DL (ref ?–200)
CO2 SERPL-SCNC: 23 MMOL/L (ref 21–32)
CREAT BLD-MCNC: 0.91 MG/DL
DEPRECATED RDW RBC AUTO: 41.8 FL (ref 35.1–46.3)
EGFRCR SERPLBLD CKD-EPI 2021: 86 ML/MIN/1.73M2 (ref 60–?)
EOSINOPHIL # BLD AUTO: 0.15 X10(3) UL (ref 0–0.7)
EOSINOPHIL NFR BLD AUTO: 1.8 %
ERYTHROCYTE [DISTWIDTH] IN BLOOD BY AUTOMATED COUNT: 12.7 % (ref 11–15)
FASTING PATIENT LIPID ANSWER: YES
FASTING STATUS PATIENT QL REPORTED: YES
GLOBULIN PLAS-MCNC: 2.7 G/DL (ref 2–3.5)
GLUCOSE BLD-MCNC: 82 MG/DL (ref 70–99)
HCT VFR BLD AUTO: 38 %
HDLC SERPL-MCNC: 33 MG/DL (ref 40–59)
HGB BLD-MCNC: 13 G/DL
IMM GRANULOCYTES # BLD AUTO: 0.02 X10(3) UL (ref 0–1)
IMM GRANULOCYTES NFR BLD: 0.2 %
LDLC SERPL CALC-MCNC: 76 MG/DL (ref ?–100)
LYMPHOCYTES # BLD AUTO: 2.31 X10(3) UL (ref 1–4)
LYMPHOCYTES NFR BLD AUTO: 28 %
MCH RBC QN AUTO: 30.7 PG (ref 26–34)
MCHC RBC AUTO-ENTMCNC: 34.2 G/DL (ref 31–37)
MCV RBC AUTO: 89.6 FL
MONOCYTES # BLD AUTO: 0.44 X10(3) UL (ref 0.1–1)
MONOCYTES NFR BLD AUTO: 5.3 %
NEUTROPHILS # BLD AUTO: 5.29 X10 (3) UL (ref 1.5–7.7)
NEUTROPHILS # BLD AUTO: 5.29 X10(3) UL (ref 1.5–7.7)
NEUTROPHILS NFR BLD AUTO: 64.3 %
NONHDLC SERPL-MCNC: 98 MG/DL (ref ?–130)
OSMOLALITY SERPL CALC.SUM OF ELEC: 293 MOSM/KG (ref 275–295)
PLATELET # BLD AUTO: 252 10(3)UL (ref 150–450)
POTASSIUM SERPL-SCNC: 4 MMOL/L (ref 3.5–5.1)
PROT SERPL-MCNC: 6.9 G/DL (ref 5.7–8.2)
RBC # BLD AUTO: 4.24 X10(6)UL
SODIUM SERPL-SCNC: 142 MMOL/L (ref 136–145)
TRIGL SERPL-MCNC: 123 MG/DL (ref 30–149)
TSI SER-ACNC: 0.99 MIU/ML (ref 0.55–4.78)
VLDLC SERPL CALC-MCNC: 19 MG/DL (ref 0–30)
WBC # BLD AUTO: 8.2 X10(3) UL (ref 4–11)

## 2024-08-31 PROCEDURE — 85025 COMPLETE CBC W/AUTO DIFF WBC: CPT

## 2024-08-31 PROCEDURE — 36415 COLL VENOUS BLD VENIPUNCTURE: CPT

## 2024-08-31 PROCEDURE — 80061 LIPID PANEL: CPT

## 2024-08-31 PROCEDURE — 80053 COMPREHEN METABOLIC PANEL: CPT

## 2024-08-31 PROCEDURE — 84443 ASSAY THYROID STIM HORMONE: CPT

## 2024-09-03 RX ORDER — TIRZEPATIDE 15 MG/.5ML
INJECTION, SOLUTION SUBCUTANEOUS
Qty: 4 ML | Refills: 0 | OUTPATIENT
Start: 2024-09-03

## 2024-09-07 ENCOUNTER — OFFICE VISIT (OUTPATIENT)
Dept: INTERNAL MEDICINE CLINIC | Facility: CLINIC | Age: 29
End: 2024-09-07
Payer: COMMERCIAL

## 2024-09-07 VITALS
HEART RATE: 80 BPM | RESPIRATION RATE: 16 BRPM | HEIGHT: 68.82 IN | TEMPERATURE: 98 F | DIASTOLIC BLOOD PRESSURE: 74 MMHG | WEIGHT: 254.81 LBS | BODY MASS INDEX: 37.74 KG/M2 | OXYGEN SATURATION: 98 % | SYSTOLIC BLOOD PRESSURE: 122 MMHG

## 2024-09-07 DIAGNOSIS — Z13.29 SCREENING FOR THYROID DISORDER: ICD-10-CM

## 2024-09-07 DIAGNOSIS — E66.09 CLASS 2 OBESITY DUE TO EXCESS CALORIES WITHOUT SERIOUS COMORBIDITY WITH BODY MASS INDEX (BMI) OF 37.0 TO 37.9 IN ADULT: ICD-10-CM

## 2024-09-07 DIAGNOSIS — Z13.220 SCREENING FOR CHOLESTEROL LEVEL: ICD-10-CM

## 2024-09-07 DIAGNOSIS — Z00.00 ENCOUNTER FOR ANNUAL PHYSICAL EXAM: Primary | ICD-10-CM

## 2024-09-07 PROBLEM — H40.003 GLAUCOMA SUSPECT OF BOTH EYES: Status: RESOLVED | Noted: 2021-03-10 | Resolved: 2024-09-07

## 2024-09-07 PROBLEM — I10 PRIMARY HYPERTENSION: Status: RESOLVED | Noted: 2023-12-18 | Resolved: 2024-09-07

## 2024-09-07 NOTE — PROGRESS NOTES
CHIEF COMPLAINT   Complete physical    HPI:   Elizabeth Eli is a 29 year old female who presents for a complete physical exam.     Wt Readings from Last 6 Encounters:   09/07/24 254 lb 12.8 oz (115.6 kg)   08/12/24 263 lb (119.3 kg)   06/07/24 284 lb 3.2 oz (128.9 kg)   04/29/24 296 lb 3.2 oz (134.4 kg)   02/29/24 (!) 315 lb (142.9 kg)   01/19/24 (!) 329 lb (149.2 kg)     Body mass index is 37.83 kg/m².     Diet and exercise are great- much improved.  Vaccines reviewed. Wearing seat belt and no texting and driving. Feels safe at home. Pap UTD. Has an IUD. Doing self breast exams. No smoking. No alcohol. No skin concerns. Labs reviewed.    Obesity- seeing weight loss clinic. On zepbound. Doing very well. Down 70 pounds. Also doing great with lifestyle changes.     Cholesterol, Total (mg/dL)   Date Value   08/31/2024 131   08/26/2023 141   08/09/2022 182     HDL Cholesterol (mg/dL)   Date Value   08/31/2024 33 (L)   08/26/2023 38 (L)   08/09/2022 42     LDL Cholesterol (mg/dL)   Date Value   08/31/2024 76   08/26/2023 63   08/09/2022 107 (H)     AST (U/L)   Date Value   08/31/2024 13   08/26/2023 11 (L)   08/09/2022 12 (L)   03/31/2014 9 (L)   04/26/2013 13 (L)   06/18/2012 11 (L)     ALT (U/L)   Date Value   08/31/2024 <7 (L)   08/26/2023 17   08/09/2022 16   03/31/2014 21   04/26/2013 20   06/18/2012 17        Current Outpatient Medications   Medication Sig Dispense Refill    Tirzepatide-Weight Management (ZEPBOUND) 15 MG/0.5ML Subcutaneous Solution Auto-injector Inject 15 mg into the skin once a week. 6 mL 0    fluticasone propionate 50 MCG/ACT Nasal Suspension 2 sprays by Each Nare route daily. 48 g 0    Rizatriptan Benzoate 10 MG Oral Tab use at onset; may repeat once after 2 hours- ONLY 2 IN 24 HOUR PERIOD MAX.  This is a 30 day supply. 12 tablet 11    ibuprofen (MOTRIN IB) 200 MG Oral Tab Take 4 tablets (800 mg total) by mouth as needed for Pain.      Levonorgestrel (MIRENA, 52 MG, IU) by Intrauterine route.       acetaminophen 500 MG Oral Tab Take 1 tablet (500 mg total) by mouth every 6 (six) hours as needed for Pain.      valACYclovir 500 MG Oral Tab Take by mouth as needed.      cetirizine 10 MG Oral Tab Take 1 tablet (10 mg total) by mouth daily as needed.        Allergies   Allergen Reactions    Latex RASH      Past Medical History:    Abnormal uterine bleeding    spotting and bleeding X 2    Allergic rhinitis    Anxiety state, unspecified    Arthritis    Asthma ([facility])    Bacterial vaginosis    Chronic back pain    Depression    Hx in high school; no medications    Gestational hypertension, third trimester ([facility])    Group B streptococcal infection    Herpes    Takes daily Acyclovir; last outbreak 3 weeks ago; no active lesions, plan for vaginal delivery    Herpes    taking acyclovir BID , states last outbreak 1 year ago     Miscarriage ([facility])    Pap smear for cervical cancer screening    wnl    Pneumonia due to organism    Pregnancy-induced hypertension ([facility])    Hx PIH w/ 1st two pregnancies, IOL @ 39.4 and 36.6 wks    S/P tonsillectomy     (spontaneous vaginal delivery) ([facility])      Past Surgical History:   Procedure Laterality Date    Tonsillectomy        Family History   Problem Relation Age of Onset    Breast Cancer Mother 32        EST    Stroke Mother     Cancer Mother         breast    Psychiatric Mother     Thyroid Disorder Mother     Psychiatric Father     Diabetes Maternal Grandmother     Breast Cancer Maternal Grandmother 45        EST    Cancer Maternal Grandmother         uterine and pancreatic    Neurological Disorder Maternal Grandmother         ALS    Hypertension Maternal Grandfather     Diabetes Maternal Grandfather     Other (Prothrobin gene mutation factor 2) Paternal Grandmother     Breast Cancer Maternal Aunt 40        EST      Social History:   Social History     Socioeconomic History    Marital status:    Occupational History    Occupation: pharmacy tech   Tobacco Use    Smoking status:  Former     Current packs/day: 0.50     Average packs/day: 0.5 packs/day for 3.0 years (1.5 ttl pk-yrs)     Types: Cigarettes     Passive exposure: Never    Smokeless tobacco: Never    Tobacco comments:     quit in 2014   Vaping Use    Vaping status: Never Used   Substance and Sexual Activity    Alcohol use: Not Currently    Drug use: No    Sexual activity: Yes     Partners: Male     Birth control/protection: Mirena, I.U.D.   Other Topics Concern    Caffeine Concern Yes    Exercise Yes    Seat Belt Yes        REVIEW OF SYSTEMS:   GENERAL: feels well otherwise  SKIN: no complaint of any unusual skin lesions  EYES: no complaint of blurred vision or double vision  HEENT: no complaint of nasal congestion, sinus pain or ST  LUNGS: no complaint of shortness of breath with exertion  CARDIOVASCULAR: no complaint of chest pain on exertion  GI: no complaint of pain,denies heartburn  : no complaints of vaginal discharge or urinary complaints  MUSCULOSKELETAL: no complaint of back pain  NEURO: no complaint of headaches  PSYCHE: no complaint of depression or anxiety  HEMATOLOGIC: denies hx of anemia    EXAM:   Resp 16   Ht 5' 8.82\" (1.748 m)   Wt 254 lb 12.8 oz (115.6 kg)   LMP 08/28/2024 (Approximate)   BMI 37.83 kg/m²   Body mass index is 37.83 kg/m².   GENERAL: well developed, well nourished,in no apparent distress  SKIN: no rashes, no suspicious lesions  HEENT: atraumatic, normocephalic, ears are clear  EYES:PERRLA, EOMI, conjunctiva are clear  NECK: supple,no adenopathy, no thyroid masses.  BREAST:DEFERRED TO GYNE  LUNGS: clear to auscultation; no rhonchi, rales, or wheezing  CARDIO: RRR without murmur  GI: no masses, organomegaly or tenderness  :DEFERRED TO GYNE   MUSCULOSKELETAL: No obvious joint deformity or swelling.  Normal gait.  EXTREMITIES: no edema, no calve tenderness  NEURO: Oriented times three,cranial nerves are grossly intact,motor and sensory are grossly intact    LABS:     Lab Results   Component  Value Date    WBC 8.2 08/31/2024    RBC 4.24 08/31/2024    HGB 13.0 08/31/2024    HCT 38.0 08/31/2024    MCV 89.6 08/31/2024    MCH 30.7 08/31/2024    MCHC 34.2 08/31/2024    RDW 12.7 08/31/2024    .0 08/31/2024    MPV 10.4 10/22/2012      Lab Results   Component Value Date    GLU 82 08/31/2024    BUN 12 08/31/2024    BUNCREA 13.2 08/31/2024    CREATSERUM 0.91 08/31/2024    ANIONGAP 8 08/31/2024     10/09/2016    GFRNAA 120 05/25/2022    GFRAA 138 05/25/2022    CA 9.2 08/31/2024    OSMOCALC 293 08/31/2024    ALKPHO 59 08/31/2024    AST 13 08/31/2024    ALT <7 (L) 08/31/2024    BILT 0.4 08/31/2024    TP 6.9 08/31/2024    ALB 4.2 08/31/2024    GLOBULIN 2.7 08/31/2024     08/31/2024    K 4.0 08/31/2024     08/31/2024    CO2 23.0 08/31/2024      Lab Results   Component Value Date    CHOLEST 131 08/31/2024    TRIG 123 08/31/2024    HDL 33 (L) 08/31/2024    LDL 76 08/31/2024    VLDL 19 08/31/2024    NONHDLC 98 08/31/2024      Lab Results   Component Value Date    TSH 0.987 08/31/2024      Lab Results   Component Value Date     08/09/2022    A1C 5.5 08/09/2022       IMAGING:     No results found.     ASSESSMENT AND PLAN:   1. Encounter for annual physical exam  - Elizabeth Eli is a 28 year old female who presents for a complete physical exam.  Pap UTD and pelvic deferred to gyne. Reviewed diet and exercise.   Pt' s weight is Body mass index is 48.88 kg/m². Recommended regular exercise.  Labs reviewed. Vaccines reviewed.  - CBC With Differential With Platelet; Future  - Comp Metabolic Panel (14); Future    2. Class 2 obesity due to excess calories without serious comorbidity with body mass index (BMI) of 37.0 to 37.9 in adult  - doing very well with weight loss  - continue lifestyle changes     3. Screening for cholesterol level  - Lipid Panel; Future    4. Screening for thyroid disorder  - TSH W Reflex To Free T4; Future     Follow up in 1 year or sooner as needed  The patient indicates  understanding of these issues and agrees to the plan.

## 2024-09-07 NOTE — PATIENT INSTRUCTIONS
Continue to see weight loss clinic    COVID booster vaccine     Flu shot recommended.  Mid October.     Use my fitness pal, improve nutrition and start exercising    Get your labs done in 1 year. You should be fasting for at least 10 hours. If you take a multivitamin with Biotin or any biotin product it should be held for 3 days prior to getting your labs done.     Follow up in 1 year or sooner as needed

## 2024-10-14 ENCOUNTER — OFFICE VISIT (OUTPATIENT)
Dept: INTERNAL MEDICINE CLINIC | Facility: CLINIC | Age: 29
End: 2024-10-14
Payer: COMMERCIAL

## 2024-10-14 VITALS
BODY MASS INDEX: 37.18 KG/M2 | HEART RATE: 77 BPM | TEMPERATURE: 99 F | SYSTOLIC BLOOD PRESSURE: 124 MMHG | HEIGHT: 68.82 IN | DIASTOLIC BLOOD PRESSURE: 66 MMHG | WEIGHT: 251 LBS | OXYGEN SATURATION: 99 % | RESPIRATION RATE: 20 BRPM

## 2024-10-14 DIAGNOSIS — H69.92 DYSFUNCTION OF LEFT EUSTACHIAN TUBE: Primary | ICD-10-CM

## 2024-10-14 PROCEDURE — 3008F BODY MASS INDEX DOCD: CPT | Performed by: STUDENT IN AN ORGANIZED HEALTH CARE EDUCATION/TRAINING PROGRAM

## 2024-10-14 PROCEDURE — 3074F SYST BP LT 130 MM HG: CPT | Performed by: STUDENT IN AN ORGANIZED HEALTH CARE EDUCATION/TRAINING PROGRAM

## 2024-10-14 PROCEDURE — 3078F DIAST BP <80 MM HG: CPT | Performed by: STUDENT IN AN ORGANIZED HEALTH CARE EDUCATION/TRAINING PROGRAM

## 2024-10-14 PROCEDURE — 99213 OFFICE O/P EST LOW 20 MIN: CPT | Performed by: STUDENT IN AN ORGANIZED HEALTH CARE EDUCATION/TRAINING PROGRAM

## 2024-10-14 NOTE — PROGRESS NOTES
Chief Complaint   Patient presents with    Ear Problem     Possible ear infection states left ear in out super sensitive  to sound  and  mild pain noticed around 9/14       SUBJECTIVE & A/P:  The patient is a 29 year old female who presents with ear fullness.    Wt Readings from Last 6 Encounters:   10/14/24 251 lb (113.9 kg)   09/07/24 254 lb 12.8 oz (115.6 kg)   08/12/24 263 lb (119.3 kg)   06/07/24 284 lb 3.2 oz (128.9 kg)   04/29/24 296 lb 3.2 oz (134.4 kg)   02/29/24 (!) 315 lb (142.9 kg)     //Ear pain likely 2/2 eustachian tube dysfunction.   Started a few weeks ago. Started in the L ear first. Then started getting more sensitive to loud noises. Hearing going in and out. Then on Saturday, the whole hearing on the L side stopped. Feels really sensitive. Almost as if something is poking in the ear when really loud noise. Not painful, just bothersome. Denies congestion, runny noise. Denies fevers, cough. Denies any recent viral illness. Denies seasonal allergies.   PLAN:   L TM are bulging with clear fluid. No evidence of infection. Will treat as eustachian tube dysfunction and start flonase 2 puffs each nostril twice a day. She is to contact me by Friday if not better so we can consider doing medrol dose pack.     Return if symptoms worsen or fail to improve.    Sandrine Noble MD    Past Medical History:   Past Medical History:    Abnormal uterine bleeding    spotting and bleeding X 2    Allergic rhinitis    Anxiety state, unspecified    Arthritis    Asthma ([facility])    Bacterial vaginosis    Chronic back pain    Depression    Hx in high school; no medications    Gestational hypertension, third trimester ([facility])    Group B streptococcal infection    Herpes    Takes daily Acyclovir; last outbreak 3 weeks ago; no active lesions, plan for vaginal delivery    Herpes    taking acyclovir BID , states last outbreak 1 year ago     Miscarriage ([facility])    Pap smear for cervical cancer screening    wnl    Pneumonia due to organism     Pregnancy-induced hypertension ([facility])    Hx PIH w/ 1st two pregnancies, IOL @ 39.4 and 36.6 wks    S/P tonsillectomy     (spontaneous vaginal delivery) ([facility])        Past Surgical History:   Past Surgical History:   Procedure Laterality Date    Tonsillectomy         Current Medications:    Current Outpatient Medications   Medication Sig Dispense Refill    Tirzepatide-Weight Management (ZEPBOUND) 15 MG/0.5ML Subcutaneous Solution Auto-injector Inject 15 mg into the skin once a week. 6 mL 0    fluticasone propionate 50 MCG/ACT Nasal Suspension 2 sprays by Each Nare route daily. 48 g 0    Rizatriptan Benzoate 10 MG Oral Tab use at onset; may repeat once after 2 hours- ONLY 2 IN 24 HOUR PERIOD MAX.  This is a 30 day supply. 12 tablet 11    ibuprofen (MOTRIN IB) 200 MG Oral Tab Take 4 tablets (800 mg total) by mouth as needed for Pain.      Levonorgestrel (MIRENA, 52 MG, IU) by Intrauterine route.      acetaminophen 500 MG Oral Tab Take 1 tablet (500 mg total) by mouth every 6 (six) hours as needed for Pain.      valACYclovir 500 MG Oral Tab Take by mouth as needed.      cetirizine 10 MG Oral Tab Take 1 tablet (10 mg total) by mouth daily as needed.         PHYSICAL EXAM:   /66 (BP Location: Left arm, Patient Position: Sitting, Cuff Size: adult)   Pulse 77   Temp 98.8 °F (37.1 °C) (Temporal)   Resp 20   Ht 5' 8.82\" (1.748 m)   Wt 251 lb (113.9 kg)   LMP 2024 (Approximate)   SpO2 99%   BMI 37.26 kg/m²  Body mass index is 37.26 kg/m².   General: No acute distress. Alert and oriented x 3.  HEENT: Ear canals clear. TMs pearly gray bilaterally, but L TM bulging noted with clear fluid behind the TM. Throat without erythema or exudates.  Neck: No lymphadenopathy.  No thyromegaly.   Respiratory: Clear to auscultation bilaterally.  No wheezes, rales, or rhonchi.   Psychiatric: Appropriate mood and affect.    LABS:   Lab Results   Component Value Date    A1C 5.5 2022      Lab Results   Component Value  Date    GLU 82 08/31/2024    BUN 12 08/31/2024    BUNCREA 13.2 08/31/2024    CREATSERUM 0.91 08/31/2024    ANIONGAP 8 08/31/2024     10/09/2016    GFRNAA 120 05/25/2022    GFRAA 138 05/25/2022    CA 9.2 08/31/2024    OSMOCALC 293 08/31/2024    ALKPHO 59 08/31/2024    AST 13 08/31/2024    ALT <7 (L) 08/31/2024    BILT 0.4 08/31/2024    TP 6.9 08/31/2024    ALB 4.2 08/31/2024    GLOBULIN 2.7 08/31/2024     08/31/2024    K 4.0 08/31/2024     08/31/2024    CO2 23.0 08/31/2024      Lab Results   Component Value Date    WBC 8.2 08/31/2024    RBC 4.24 08/31/2024    HGB 13.0 08/31/2024    HCT 38.0 08/31/2024    MCV 89.6 08/31/2024    MCH 30.7 08/31/2024    MCHC 34.2 08/31/2024    RDW 12.7 08/31/2024    .0 08/31/2024    MPV 10.4 10/22/2012        IMAGING: none to review.

## 2024-10-20 ENCOUNTER — OFFICE VISIT (OUTPATIENT)
Dept: FAMILY MEDICINE CLINIC | Facility: CLINIC | Age: 29
End: 2024-10-20
Payer: COMMERCIAL

## 2024-10-20 VITALS
DIASTOLIC BLOOD PRESSURE: 70 MMHG | TEMPERATURE: 98 F | BODY MASS INDEX: 37.18 KG/M2 | HEART RATE: 102 BPM | SYSTOLIC BLOOD PRESSURE: 119 MMHG | OXYGEN SATURATION: 99 % | WEIGHT: 251 LBS | HEIGHT: 69 IN | RESPIRATION RATE: 18 BRPM

## 2024-10-20 DIAGNOSIS — J02.9 SORE THROAT: ICD-10-CM

## 2024-10-20 DIAGNOSIS — H69.92 DYSFUNCTION OF LEFT EUSTACHIAN TUBE: Primary | ICD-10-CM

## 2024-10-20 PROCEDURE — 87081 CULTURE SCREEN ONLY: CPT | Performed by: NURSE PRACTITIONER

## 2024-10-20 NOTE — PROGRESS NOTES
CHIEF COMPLAINT:     Chief Complaint   Patient presents with    Sore Throat     Sore throat x3days, Lf ear pain since Sept 14th  Denies fever,headache  OTC Dayquil,nose spray         HPI:   Elizabeth Eli is a 29 year old female who presents to clinic today with complaints of left ear pain. Has had for 3  days. Pain is described as losing hearing and sudden pain in her ear for a few weeks, an MD said there is fluid behind her ear drum and now she describes pain extending back behind her head and down her jaw. Patient denies history of ear infections. Home treatment includes day quil and Flonase nasal spray.    Associated symptoms:  Patient reports decreased hearing. Patient denies hearing loss. Patient denies drainage. Patient reports use of cotton tipped ear swabs to clean the ears. Patient reports following URI symptoms: sore throat, denies fever, denies cough, denies sinus pain  , maybe some mucus down there.     Current Outpatient Medications   Medication Sig Dispense Refill    Tirzepatide-Weight Management (ZEPBOUND) 15 MG/0.5ML Subcutaneous Solution Auto-injector Inject 15 mg into the skin once a week. 6 mL 0    fluticasone propionate 50 MCG/ACT Nasal Suspension 2 sprays by Each Nare route daily. 48 g 0    Rizatriptan Benzoate 10 MG Oral Tab use at onset; may repeat once after 2 hours- ONLY 2 IN 24 HOUR PERIOD MAX.  This is a 30 day supply. 12 tablet 11    ibuprofen (MOTRIN IB) 200 MG Oral Tab Take 4 tablets (800 mg total) by mouth as needed for Pain.      Levonorgestrel (MIRENA, 52 MG, IU) by Intrauterine route.      acetaminophen 500 MG Oral Tab Take 1 tablet (500 mg total) by mouth every 6 (six) hours as needed for Pain.      valACYclovir 500 MG Oral Tab Take by mouth as needed.      cetirizine 10 MG Oral Tab Take 1 tablet (10 mg total) by mouth daily as needed.        Past Medical History:    Abnormal uterine bleeding    spotting and bleeding X 2    Allergic rhinitis    Anxiety state, unspecified     Arthritis    Asthma ([facility])    Bacterial vaginosis    Chronic back pain    Depression    Hx in high school; no medications    Gestational hypertension, third trimester ([facility])    Group B streptococcal infection    Herpes    Takes daily Acyclovir; last outbreak 3 weeks ago; no active lesions, plan for vaginal delivery    Herpes    taking acyclovir BID , states last outbreak 1 year ago     Miscarriage ([facility])    Pap smear for cervical cancer screening    wnl    Pneumonia due to organism    Pregnancy-induced hypertension ([facility])    Hx PIH w/ 1st two pregnancies, IOL @ 39.4 and 36.6 wks    S/P tonsillectomy     (spontaneous vaginal delivery) ([facility])      Social History:  Social History     Socioeconomic History    Marital status:    Occupational History    Occupation: pharmacy tech   Tobacco Use    Smoking status: Former     Current packs/day: 0.50     Average packs/day: 0.5 packs/day for 3.0 years (1.5 ttl pk-yrs)     Types: Cigarettes     Passive exposure: Never    Smokeless tobacco: Never    Tobacco comments:     quit in    Vaping Use    Vaping status: Never Used   Substance and Sexual Activity    Alcohol use: Not Currently    Drug use: No    Sexual activity: Yes     Partners: Male     Birth control/protection: Mirena, I.U.D.   Other Topics Concern    Caffeine Concern Yes    Exercise Yes    Seat Belt Yes        REVIEW OF SYSTEMS:   GENERAL: See HPI  Review of Systems   Constitutional:  Negative for chills and fever.   HENT:  Positive for ear pain and sore throat. Negative for sinus pressure and sinus pain.    Respiratory:  Negative for cough.    Neurological:  Positive for headaches.   All other systems reviewed and are negative.       EXAM:   /70   Pulse 102   Temp 97.9 °F (36.6 °C)   Resp 18   Ht 5' 9\" (1.753 m)   Wt 251 lb (113.9 kg)   LMP 10/18/2024 (Approximate)   SpO2 99%   BMI 37.07 kg/m²   Physical Exam  Vitals and nursing note reviewed.   Constitutional:       Appearance: Normal appearance.  She is not ill-appearing.   HENT:      Head: Normocephalic and atraumatic.      Right Ear: Hearing, ear canal and external ear normal. No drainage or tenderness. A middle ear effusion is present. There is no impacted cerumen. No mastoid tenderness. Tympanic membrane is not injected, perforated, erythematous or bulging.      Left Ear: Hearing, ear canal and external ear normal. No drainage or tenderness. A middle ear effusion is present. There is no impacted cerumen. No mastoid tenderness. Tympanic membrane is not injected, perforated, erythematous or bulging.      Nose: Nose normal. No mucosal edema, congestion or rhinorrhea.      Right Sinus: No maxillary sinus tenderness or frontal sinus tenderness.      Left Sinus: No maxillary sinus tenderness or frontal sinus tenderness.      Mouth/Throat:      Lips: No lesions.      Mouth: Mucous membranes are moist. No oral lesions.      Palate: No lesions.      Pharynx: Oropharynx is clear. Uvula midline. No oropharyngeal exudate or posterior oropharyngeal erythema.      Tonsils: No tonsillar exudate or tonsillar abscesses.   Eyes:      General: No scleral icterus.        Right eye: No discharge.         Left eye: No discharge.      Conjunctiva/sclera: Conjunctivae normal.   Cardiovascular:      Rate and Rhythm: Normal rate and regular rhythm.      Heart sounds: Normal heart sounds. No murmur heard.  Pulmonary:      Effort: Pulmonary effort is normal.      Breath sounds: Normal breath sounds. No wheezing.   Lymphadenopathy:      Cervical: No cervical adenopathy.      Right cervical: No superficial, deep or posterior cervical adenopathy.     Left cervical: No superficial, deep or posterior cervical adenopathy.   Skin:     General: Skin is warm and dry.   Neurological:      Mental Status: She is alert and oriented to person, place, and time.   Psychiatric:         Mood and Affect: Mood normal.         Behavior: Behavior normal.          ASSESSMENT AND PLAN:   Elizabeth Eli  is a 29 year old female who presents with ear problems symptoms are consistent with    ASSESSMENT:  Encounter Diagnoses   Name Primary?    Dysfunction of left eustachian tube Yes    Sore throat        PLAN: Rapid strep negative, will send culture and follow with results and recommendations. TM exam normal, consider eustachian tube dysfunction. Continue Flonase and add daily zyrtec. May alternate Tylenol and Motrin for pain. Consider 3 days of afrin use and then discontinue due to issues with rebound congestion with continued use. Consider using sudafed to reduce congestion. Meds as listed below.  Comfort measures as described in Patient Instructions. Plan for follow up or consider ENT specialist consult if ear pain continues.     Meds & Refills for this Visit:  Requested Prescriptions      No prescriptions requested or ordered in this encounter         Risk and benefits of medication discussed.   If antibiotics prescribed, stressed importance of completing full course of antibiotic.     Acetaminophen or NSAID prn pain.      Follow up with PCP if s/sx worsen, do not begin to improve in 3 days, or if fever of 100.4 or greater persists for 72 hours.      Patient voiced understand and is in agreement with treatment plan.    Patient Instructions   Eustachian tube problems  Written by the doctors and editors at [facility]     What is the eustachian tube? -- The eustachian tube is a tube that connects the middle ear (the part of the ear behind the eardrum) to the back of the nose and throat       Normally, the eustachian tube helps keep the air pressure inside the middle ear the same as the air pressure outside the middle ear. If there is a problem with the eustachian tube, the air pressure inside the middle ear won’t be the same as the air pressure outside it. This can damage the middle ear and cause ear pain, hearing loss, and other symptoms. “Ear barotrauma” is the medical term for when people have symptoms or damage in  the middle ear because of air pressure differences.    Most eustachian tube problems are not serious. They usually last only a short time and get better on their own.    But eustachian tube problems sometimes lead to serious problems, such as:    ?A middle ear infection  ?A torn eardrum  ?Hearing loss  Long-term hearing loss from eustachian tube problems can also lead to language or speech problems in children.    What causes eustachian tube problems? -- Common causes of eustachian tube problems are:    ?Illnesses or conditions that make the eustachian tubes swollen or inflamed - These include colds, allergies, ear infections, or sinus infections. The sinuses are hollow areas in the bones of the face.  ?Sudden air pressure changes - Sudden air pressure changes can happen when people fly in an airplane, scuba dive, or drive in the mountains.  ?Growths that block the eustachian tube  ?Being born with an abnormal eustachian tube    What are the symptoms of a eustachian tube problem? -- Common symptoms of a eustachian tube problem include:    ?Ear pain  ?Feeling pressure or fullness in the ear  ?Trouble hearing  ?Ringing in the ear  ?Feeling dizzy  Should I see a doctor or nurse? -- See your doctor or nurse if your symptoms are severe, get worse, or if they don’t go away after a few days.    Will I need tests? -- Probably not. Your doctor or nurse should be able to tell if you have a eustachian tube problem by learning about your symptoms and doing an exam.    If your symptoms are severe or last for a long time, your doctor or nurse might:    ?Have you see a special kind of doctor called an ear, nose, and throat doctor  ?Do tests to check your hearing  ?Do an imaging test - Imaging tests can create pictures of the inside of the body.    How are eustachian tube problems treated? -- Treatment depends on what’s causing the eustachian tube problem. Depending on your individual situation, your doctor might treat you with one  or more of the following:    ?Nose sprays   -Nasal steroids such as Flonase or Nasonex   - OTC nasal sprays like Afrin (oxymetazoline) can be used every 12 hours for  NO MORE than 3 days.  Longer use leads to worsening congestion and blood  pressure issues. This medication should be avoided in patients with high blood  pressure  ?Antihistamines - These medicines are usually used to treat allergies. They help stop itching, sneezing, and runny nose symptoms. Examples: Claritin, Allegra, Zyrtec.   ?Decongestants - These medicines can help with stuffy nose symptoms. These should not be used if patient has underlying blood pressure issues (high blood pressure etc.)   -Sudafed (pseudoephedrine)- according to package instructions.     ?Antibiotic medicines - Antibiotics are not needed to treat eustachian tube problems. But if you have an infection caused by bacteria in addition to your eustachian tube problems, your doctor can treat it with antibiotics.  ?Surgery - Most people do not need surgery for eustachian tube problems. But people might need surgery if their symptoms don’t get better with medicines or they have severe or long-term symptoms.

## 2024-10-20 NOTE — PATIENT INSTRUCTIONS
Eustachian tube problems  Written by the doctors and editors at [facility]     What is the eustachian tube? -- The eustachian tube is a tube that connects the middle ear (the part of the ear behind the eardrum) to the back of the nose and throat       Normally, the eustachian tube helps keep the air pressure inside the middle ear the same as the air pressure outside the middle ear. If there is a problem with the eustachian tube, the air pressure inside the middle ear won’t be the same as the air pressure outside it. This can damage the middle ear and cause ear pain, hearing loss, and other symptoms. “Ear barotrauma” is the medical term for when people have symptoms or damage in the middle ear because of air pressure differences.    Most eustachian tube problems are not serious. They usually last only a short time and get better on their own.    But eustachian tube problems sometimes lead to serious problems, such as:    ?A middle ear infection  ?A torn eardrum  ?Hearing loss  Long-term hearing loss from eustachian tube problems can also lead to language or speech problems in children.    What causes eustachian tube problems? -- Common causes of eustachian tube problems are:    ?Illnesses or conditions that make the eustachian tubes swollen or inflamed - These include colds, allergies, ear infections, or sinus infections. The sinuses are hollow areas in the bones of the face.  ?Sudden air pressure changes - Sudden air pressure changes can happen when people fly in an airplane, scuba dive, or drive in the mountains.  ?Growths that block the eustachian tube  ?Being born with an abnormal eustachian tube    What are the symptoms of a eustachian tube problem? -- Common symptoms of a eustachian tube problem include:    ?Ear pain  ?Feeling pressure or fullness in the ear  ?Trouble hearing  ?Ringing in the ear  ?Feeling dizzy  Should I see a doctor or nurse? -- See your doctor or nurse if your symptoms are severe, get worse,  or if they don’t go away after a few days.    Will I need tests? -- Probably not. Your doctor or nurse should be able to tell if you have a eustachian tube problem by learning about your symptoms and doing an exam.    If your symptoms are severe or last for a long time, your doctor or nurse might:    ?Have you see a special kind of doctor called an ear, nose, and throat doctor  ?Do tests to check your hearing  ?Do an imaging test - Imaging tests can create pictures of the inside of the body.    How are eustachian tube problems treated? -- Treatment depends on what’s causing the eustachian tube problem. Depending on your individual situation, your doctor might treat you with one or more of the following:    ?Nose sprays   -Nasal steroids such as Flonase or Nasonex   - OTC nasal sprays like Afrin (oxymetazoline) can be used every 12 hours for  NO MORE than 3 days.  Longer use leads to worsening congestion and blood  pressure issues. This medication should be avoided in patients with high blood  pressure  ?Antihistamines - These medicines are usually used to treat allergies. They help stop itching, sneezing, and runny nose symptoms. Examples: Claritin, Allegra, Zyrtec.   ?Decongestants - These medicines can help with stuffy nose symptoms. These should not be used if patient has underlying blood pressure issues (high blood pressure etc.)   -Sudafed (pseudoephedrine)- according to package instructions.     ?Antibiotic medicines - Antibiotics are not needed to treat eustachian tube problems. But if you have an infection caused by bacteria in addition to your eustachian tube problems, your doctor can treat it with antibiotics.  ?Surgery - Most people do not need surgery for eustachian tube problems. But people might need surgery if their symptoms don’t get better with medicines or they have severe or long-term symptoms.

## 2024-10-21 LAB
CONTROL LINE PRESENT WITH A CLEAR BACKGROUND (YES/NO): YES YES/NO
KIT LOT #: NORMAL NUMERIC

## 2024-10-28 ENCOUNTER — OFFICE VISIT (OUTPATIENT)
Dept: INTERNAL MEDICINE CLINIC | Facility: CLINIC | Age: 29
End: 2024-10-28
Payer: COMMERCIAL

## 2024-10-28 VITALS
TEMPERATURE: 97 F | HEIGHT: 69 IN | SYSTOLIC BLOOD PRESSURE: 114 MMHG | DIASTOLIC BLOOD PRESSURE: 64 MMHG | WEIGHT: 246.63 LBS | HEART RATE: 91 BPM | OXYGEN SATURATION: 98 % | RESPIRATION RATE: 16 BRPM | BODY MASS INDEX: 36.53 KG/M2

## 2024-10-28 DIAGNOSIS — J01.00 ACUTE NON-RECURRENT MAXILLARY SINUSITIS: Primary | ICD-10-CM

## 2024-10-28 PROCEDURE — 3078F DIAST BP <80 MM HG: CPT | Performed by: NURSE PRACTITIONER

## 2024-10-28 PROCEDURE — 99213 OFFICE O/P EST LOW 20 MIN: CPT | Performed by: NURSE PRACTITIONER

## 2024-10-28 PROCEDURE — 3008F BODY MASS INDEX DOCD: CPT | Performed by: NURSE PRACTITIONER

## 2024-10-28 PROCEDURE — 3074F SYST BP LT 130 MM HG: CPT | Performed by: NURSE PRACTITIONER

## 2024-10-28 NOTE — PATIENT INSTRUCTIONS
Take antibiotic completely as ordered     Take antibiotic with food    Eat yogurt twice a day while on antibiotic or take an oral probiotic    Monitor for diarrhea, side effects, allergic reaction    If you have a mild allergic reaction take benadryl and call the office. If it is severe and you have lip or throat swelling or trouble breathing go to the ER or call 911    Continue the flonase     Hydrate well    Follow up in one week as needed or when routine care is due

## 2024-10-28 NOTE — PROGRESS NOTES
Elizabeth Eli is a 29 year old female.  CHIEF COMPLAINT   Sinus issues     HPI:   Symptoms started two weeks ago with an ear issue feeling less hearing and clogged. Then started drained but for the last week has been having sinus pain and tenderness that is worsening.     No cough, fever, chills.     Current Outpatient Medications   Medication Sig Dispense Refill    amoxicillin clavulanate 875-125 MG Oral Tab Take 1 tablet by mouth 2 (two) times daily for 10 days. 20 tablet 0    Tirzepatide-Weight Management (ZEPBOUND) 15 MG/0.5ML Subcutaneous Solution Auto-injector Inject 15 mg into the skin once a week. 6 mL 0    fluticasone propionate 50 MCG/ACT Nasal Suspension 2 sprays by Each Nare route daily. 48 g 0    Rizatriptan Benzoate 10 MG Oral Tab use at onset; may repeat once after 2 hours- ONLY 2 IN 24 HOUR PERIOD MAX.  This is a 30 day supply. 12 tablet 11    ibuprofen (MOTRIN IB) 200 MG Oral Tab Take 4 tablets (800 mg total) by mouth as needed for Pain.      Levonorgestrel (MIRENA, 52 MG, IU) by Intrauterine route.      acetaminophen 500 MG Oral Tab Take 1 tablet (500 mg total) by mouth every 6 (six) hours as needed for Pain.      valACYclovir 500 MG Oral Tab Take by mouth as needed.      cetirizine 10 MG Oral Tab Take 1 tablet (10 mg total) by mouth daily as needed.        Past Medical History:    Abnormal uterine bleeding    spotting and bleeding X 2    Allergic rhinitis    Anxiety state, unspecified    Arthritis    Asthma ([facility])    Bacterial vaginosis    Chronic back pain    Depression    Hx in high school; no medications    Gestational hypertension, third trimester ([facility])    Group B streptococcal infection    Herpes    Takes daily Acyclovir; last outbreak 3 weeks ago; no active lesions, plan for vaginal delivery    Herpes    taking acyclovir BID , states last outbreak 1 year ago     Miscarriage ([facility])    Pap smear for cervical cancer screening    wnl    Pneumonia due to organism    Pregnancy-induced  hypertension ([facility])    Hx PIH w/ 1st two pregnancies, IOL @ 39.4 and 36.6 wks    S/P tonsillectomy     (spontaneous vaginal delivery) ([facility])      Social History:  Social History     Socioeconomic History    Marital status:    Occupational History    Occupation: pharmacy tech   Tobacco Use    Smoking status: Former     Current packs/day: 0.50     Average packs/day: 0.5 packs/day for 3.0 years (1.5 ttl pk-yrs)     Types: Cigarettes     Passive exposure: Never    Smokeless tobacco: Never    Tobacco comments:     quit in    Vaping Use    Vaping status: Never Used   Substance and Sexual Activity    Alcohol use: Not Currently    Drug use: No    Sexual activity: Yes     Partners: Male     Birth control/protection: Mirena, I.U.D.   Other Topics Concern    Caffeine Concern Yes    Exercise Yes    Seat Belt Yes        REVIEW OF SYSTEMS:   See HPI     EXAM:     /64 (BP Location: Right arm, Patient Position: Sitting, Cuff Size: large)   Pulse 91   Temp 97.1 °F (36.2 °C) (Temporal)   Resp 16   Ht 5' 9\" (1.753 m)   Wt 246 lb 9.6 oz (111.9 kg)   LMP 10/18/2024 (Approximate)   SpO2 98%   BMI 36.42 kg/m²   Body mass index is 36.42 kg/m².   GENERAL: well developed, well nourished,in no apparent distress  HEENT: atraumatic, normocephalic, Left ear is clear, right TM with mild erythema, sinus pain  EYES: conjunctiva are clear  NECK: supple,no adenopathy    LUNGS: clear to auscultation; no rhonchi, rales, or wheezing  CARDIO: RRR without murmur  GI: no masses, organomegaly or tenderness  MUSCULOSKELETAL: Normal gait.  EXTREMITIES: no edema  NEURO: Oriented times three    LABS:      Lab Results   Component Value Date    WBC 8.2 2024    RBC 4.24 2024    HGB 13.0 2024    HCT 38.0 2024    MCV 89.6 2024    MCH 30.7 2024    MCHC 34.2 2024    RDW 12.7 2024    .0 2024    MPV 10.4 10/22/2012      Lab Results   Component Value Date    GLU 82 2024    BUN 12  08/31/2024    BUNCREA 13.2 08/31/2024    CREATSERUM 0.91 08/31/2024    ANIONGAP 8 08/31/2024     10/09/2016    GFRNAA 120 05/25/2022    GFRAA 138 05/25/2022    CA 9.2 08/31/2024    OSMOCALC 293 08/31/2024    ALKPHO 59 08/31/2024    AST 13 08/31/2024    ALT <7 (L) 08/31/2024    BILT 0.4 08/31/2024    TP 6.9 08/31/2024    ALB 4.2 08/31/2024    GLOBULIN 2.7 08/31/2024     08/31/2024    K 4.0 08/31/2024     08/31/2024    CO2 23.0 08/31/2024      Lab Results   Component Value Date    CHOLEST 131 08/31/2024    TRIG 123 08/31/2024    HDL 33 (L) 08/31/2024    LDL 76 08/31/2024    VLDL 19 08/31/2024    NONHDLC 98 08/31/2024      Lab Results   Component Value Date    TSH 0.987 08/31/2024      Lab Results   Component Value Date     08/09/2022    A1C 5.5 08/09/2022          ASSESSMENT AND PLAN:   1. Acute non-recurrent maxillary sinusitis  - take the abt  - continue OTC meds for supportive care   - medrol dose pack if no improvement   - amoxicillin clavulanate 875-125 MG Oral Tab; Take 1 tablet by mouth 2 (two) times daily for 10 days.  Dispense: 20 tablet; Refill: 0      The patient indicates understanding of these issues and agrees to the plan.  The patient is asked to return as needed or when routine care is due.

## 2024-11-26 ENCOUNTER — TELEMEDICINE (OUTPATIENT)
Facility: CLINIC | Age: 29
End: 2024-11-26
Payer: COMMERCIAL

## 2024-11-26 DIAGNOSIS — E78.5 HYPERLIPIDEMIA, UNSPECIFIED HYPERLIPIDEMIA TYPE: ICD-10-CM

## 2024-11-26 DIAGNOSIS — E88.819 INSULIN RESISTANCE: ICD-10-CM

## 2024-11-26 DIAGNOSIS — E88.810 METABOLIC SYNDROME: ICD-10-CM

## 2024-11-26 DIAGNOSIS — Z51.81 ENCOUNTER FOR THERAPEUTIC DRUG MONITORING: Primary | ICD-10-CM

## 2024-11-26 DIAGNOSIS — E66.812 CLASS 2 OBESITY WITH BODY MASS INDEX (BMI) OF 39.0 TO 39.9 IN ADULT, UNSPECIFIED OBESITY TYPE, UNSPECIFIED WHETHER SERIOUS COMORBIDITY PRESENT: ICD-10-CM

## 2024-11-26 PROCEDURE — 99213 OFFICE O/P EST LOW 20 MIN: CPT | Performed by: PHYSICIAN ASSISTANT

## 2024-11-26 RX ORDER — TIRZEPATIDE 15 MG/.5ML
15 INJECTION, SOLUTION SUBCUTANEOUS WEEKLY
Qty: 6 ML | Refills: 1 | Status: SHIPPED | OUTPATIENT
Start: 2024-11-26

## 2024-11-26 NOTE — PROGRESS NOTES
This visit is conducted using Telemedicine with live, interactive video and audio.    Patient has been referred to the [facility] website at [email or web address] to review the yearly Consent to Treat document.    Patient understands and accepts financial responsibility for any deductible, co-insurance and/or co-pays associated with this service.      HISTORY OF PRESENT ILLNESS  Chief Complaint   Patient presents with    Weight Check       Elizabeth Eli is a 29 year old female here for follow up in medical weight loss program.   244lbs  Pt is compliant on zepbound  Denies chest pain, shortness of breath, dizziness, blurred vision, headache, paresthesia, nausea/vomiting.   Higher protein  Focusing on portion size  Struggles still with breakfast, but trying to make sure there is some protein  Has cut down on eating out    Exercise/Activity: walking, trying to do some home workouts, trying to get back to the gym next week  Nutrition: 24 hour food log reviewed, eating regular meals, +protein  Stress: 7/10 -  got a new job, holidays, managing well, back to [facility], audio books  Sleep: 7 hours/night     Parents played kids soccer game and she was able to play the whole game!    Wt Readings from Last 6 Encounters:   10/28/24 246 lb 9.6 oz (111.9 kg)   10/20/24 251 lb (113.9 kg)   10/14/24 251 lb (113.9 kg)   09/07/24 254 lb 12.8 oz (115.6 kg)   08/12/24 263 lb (119.3 kg)   06/07/24 284 lb 3.2 oz (128.9 kg)            Breakfast Lunch Dinner Snacks Fluids   Reviewed           REVIEW OF SYSTEMS  GENERAL HEALTH: feels well otherwise, denied any fevers chills or night sweats   RESPIRATORY: denies shortness of breath   CARDIOVASCULAR: denies chest pain  GI: denies abdominal pain    EXAM  LMP 10/18/2024 (Approximate)   GENERAL: well developed, well nourished,in no apparent distress, A/O x3  SKIN: no rashes,no suspicious lesions on visible skin  HEENT: atraumatic, normocephalic  LUNGS: no increased effort or work with  breathing   NEURO: speaking fluently and in clear sentences    Lab Results   Component Value Date    WBC 8.2 08/31/2024    RBC 4.24 08/31/2024    HGB 13.0 08/31/2024    HCT 38.0 08/31/2024    MCV 89.6 08/31/2024    MCH 30.7 08/31/2024    MCHC 34.2 08/31/2024    RDW 12.7 08/31/2024    .0 08/31/2024    MPV 10.4 10/22/2012     Lab Results   Component Value Date    GLU 82 08/31/2024    BUN 12 08/31/2024    BUNCREA 13.2 08/31/2024    CREATSERUM 0.91 08/31/2024    ANIONGAP 8 08/31/2024     10/09/2016    GFRNAA 120 05/25/2022    GFRAA 138 05/25/2022    CA 9.2 08/31/2024    OSMOCALC 293 08/31/2024    ALKPHO 59 08/31/2024    AST 13 08/31/2024    ALT <7 (L) 08/31/2024    BILT 0.4 08/31/2024    TP 6.9 08/31/2024    ALB 4.2 08/31/2024    GLOBULIN 2.7 08/31/2024     08/31/2024    K 4.0 08/31/2024     08/31/2024    CO2 23.0 08/31/2024     Lab Results   Component Value Date     08/09/2022    A1C 5.5 08/09/2022     Lab Results   Component Value Date    CHOLEST 131 08/31/2024    TRIG 123 08/31/2024    HDL 33 (L) 08/31/2024    LDL 76 08/31/2024    VLDL 19 08/31/2024    NONHDLC 98 08/31/2024     Lab Results   Component Value Date    TSH 0.987 08/31/2024     Lab Results   Component Value Date    B12 461 10/04/2022     Lab Results   Component Value Date    VITD 23.0 (L) 10/04/2022       Medications Ordered Prior to Encounter[1]    ASSESSMENT  Analyzed weight data:       Diagnoses and all orders for this visit:    Encounter for therapeutic drug monitoring  -     Tirzepatide-Weight Management (ZEPBOUND) 15 MG/0.5ML Subcutaneous Solution Auto-injector; Inject 15 mg into the skin once a week.    Class 2 obesity with body mass index (BMI) of 39.0 to 39.9 in adult, unspecified obesity type, unspecified whether serious comorbidity present  -     Tirzepatide-Weight Management (ZEPBOUND) 15 MG/0.5ML Subcutaneous Solution Auto-injector; Inject 15 mg into the skin once a week.    Hyperlipidemia, unspecified  hyperlipidemia type  -     Tirzepatide-Weight Management (ZEPBOUND) 15 MG/0.5ML Subcutaneous Solution Auto-injector; Inject 15 mg into the skin once a week.    Insulin resistance  -     Tirzepatide-Weight Management (ZEPBOUND) 15 MG/0.5ML Subcutaneous Solution Auto-injector; Inject 15 mg into the skin once a week.    Metabolic syndrome  -     Tirzepatide-Weight Management (ZEPBOUND) 15 MG/0.5ML Subcutaneous Solution Auto-injector; Inject 15 mg into the skin once a week.        PLAN  Initial Weight: 328lbs  Initial Weight Date: 6/16/23  Today's Weight: 244lbs  5% Goal: 16.4lbs  10% Goal: 32.8lbs   Total Weight Loss: -19lbs/Net loss 84lb    Will continue zepbound - advised side effects and adverse effects of medication   HLD - lifestyle changes  Insulin resistance - continue current medications, low carb diet  Consistency with logging foods - protein and produce  Incorporate strength/resistance training  Nutrition: low carb diet/ recommended to eat breakfast daily/ regular protein intake  Medication use and side effects reviewed with patient.  Medication contraindications: phentermine, topamax   Follow up with dietitian and psychologist as recommended.  Discussed the role of sleep and stress in weight management.  Counseled on comprehensive weight loss plan including attention to nutrition, exercise and behavior/stress management for success. See patient instruction below for more details.  Discussed strategies to overcome barriers to successful weight loss and weight maintenance  FITTE: ACSM recommendations (150-300 minutes/ week in active weight loss)   Weight Loss consent to treat reviewed and signed     There are no Patient Instructions on file for this visit.    No follow-ups on file.    Patient verbalizes understanding.    Nimco Mark PA-C  11/26/2024    Please note that the following visit was completed using two-way, real-time interactive audio and video communication.  This has been done in good [place]  to provide continuity of care in the best interest of the provider-patient relationship, due to the ongoing public health crisis/national emergency and because of restrictions of visitation.  There are limitations of this visit as no physical exam could be performed.  Every conscious effort was taken to allow for sufficient and adequate time.  This billing was spent on reviewing labs, medications, radiology tests and decision making.  Appropriate medical decision-making and tests are ordered as detailed in the plan of care above    Nimco Mark PA-C           [1]   Current Outpatient Medications on File Prior to Visit   Medication Sig Dispense Refill    Tirzepatide-Weight Management (ZEPBOUND) 15 MG/0.5ML Subcutaneous Solution Auto-injector Inject 15 mg into the skin once a week. 6 mL 0    fluticasone propionate 50 MCG/ACT Nasal Suspension 2 sprays by Each Nare route daily. 48 g 0    Rizatriptan Benzoate 10 MG Oral Tab use at onset; may repeat once after 2 hours- ONLY 2 IN 24 HOUR PERIOD MAX.  This is a 30 day supply. 12 tablet 11    ibuprofen (MOTRIN IB) 200 MG Oral Tab Take 4 tablets (800 mg total) by mouth as needed for Pain.      Levonorgestrel (MIRENA, 52 MG, IU) by Intrauterine route.      acetaminophen 500 MG Oral Tab Take 1 tablet (500 mg total) by mouth every 6 (six) hours as needed for Pain.      valACYclovir 500 MG Oral Tab Take by mouth as needed.      cetirizine 10 MG Oral Tab Take 1 tablet (10 mg total) by mouth daily as needed.       No current facility-administered medications on file prior to visit.

## 2024-12-07 ENCOUNTER — OFFICE VISIT (OUTPATIENT)
Dept: INTERNAL MEDICINE CLINIC | Facility: CLINIC | Age: 29
End: 2024-12-07
Payer: COMMERCIAL

## 2024-12-07 VITALS
SYSTOLIC BLOOD PRESSURE: 116 MMHG | RESPIRATION RATE: 16 BRPM | HEIGHT: 69 IN | WEIGHT: 242.38 LBS | DIASTOLIC BLOOD PRESSURE: 72 MMHG | TEMPERATURE: 97 F | OXYGEN SATURATION: 99 % | HEART RATE: 74 BPM | BODY MASS INDEX: 35.9 KG/M2

## 2024-12-07 DIAGNOSIS — H69.92 DYSFUNCTION OF LEFT EUSTACHIAN TUBE: Primary | ICD-10-CM

## 2024-12-07 PROCEDURE — 99213 OFFICE O/P EST LOW 20 MIN: CPT | Performed by: NURSE PRACTITIONER

## 2024-12-07 PROCEDURE — 3074F SYST BP LT 130 MM HG: CPT | Performed by: NURSE PRACTITIONER

## 2024-12-07 PROCEDURE — 3078F DIAST BP <80 MM HG: CPT | Performed by: NURSE PRACTITIONER

## 2024-12-07 PROCEDURE — G2211 COMPLEX E/M VISIT ADD ON: HCPCS | Performed by: NURSE PRACTITIONER

## 2024-12-07 PROCEDURE — 3008F BODY MASS INDEX DOCD: CPT | Performed by: NURSE PRACTITIONER

## 2024-12-07 RX ORDER — FLUTICASONE PROPIONATE 50 MCG
2 SPRAY, SUSPENSION (ML) NASAL DAILY
Qty: 48 G | Refills: 0 | Status: SHIPPED | OUTPATIENT
Start: 2024-12-07

## 2024-12-07 NOTE — PATIENT INSTRUCTIONS
Do the ear exercises listed above    Use flonase    If no improvement do claratin daily for a week    If still no improvement see the ENT    Follow up as needed or when routine care is due

## 2024-12-07 NOTE — PROGRESS NOTES
Elizabeth Eli is a 29 year old female.  CHIEF COMPLAINT   Sinus issues     HPI:   Treated recently for sinusitis a month ago. The sinus pain, ear pain is gone now. The right ear is okay. The left ear still with feeling like it is clogged and hearing is worse. The sounds seem more high pitched and her ear is sensitive.           Current Outpatient Medications   Medication Sig Dispense Refill    Tirzepatide-Weight Management (ZEPBOUND) 15 MG/0.5ML Subcutaneous Solution Auto-injector Inject 15 mg into the skin once a week. 6 mL 1    fluticasone propionate 50 MCG/ACT Nasal Suspension 2 sprays by Each Nare route daily. 48 g 0    Rizatriptan Benzoate 10 MG Oral Tab use at onset; may repeat once after 2 hours- ONLY 2 IN 24 HOUR PERIOD MAX.  This is a 30 day supply. 12 tablet 11    ibuprofen (MOTRIN IB) 200 MG Oral Tab Take 4 tablets (800 mg total) by mouth as needed for Pain.      Levonorgestrel (MIRENA, 52 MG, IU) by Intrauterine route.      acetaminophen 500 MG Oral Tab Take 1 tablet (500 mg total) by mouth every 6 (six) hours as needed for Pain.      valACYclovir 500 MG Oral Tab Take by mouth as needed.      cetirizine 10 MG Oral Tab Take 1 tablet (10 mg total) by mouth daily as needed.        Past Medical History:    Abnormal uterine bleeding    spotting and bleeding X 2    Allergic rhinitis    Anxiety state, unspecified    Arthritis    Asthma (HCC)    Bacterial vaginosis    Chronic back pain    Depression    Hx in high school; no medications    Gestational hypertension, third trimester (HCC)    Group B streptococcal infection    Herpes    Takes daily Acyclovir; last outbreak 3 weeks ago; no active lesions, plan for vaginal delivery    Herpes    taking acyclovir BID , states last outbreak 1 year ago     Miscarriage (HCC)    Pap smear for cervical cancer screening    wnl    Pneumonia due to organism    Pregnancy-induced hypertension (HCC)    Hx PIH w/ 1st two pregnancies, IOL @ 39.4 and 36.6 wks    S/P tonsillectomy      (spontaneous vaginal delivery) ([facility])      Social History:  Social History     Socioeconomic History    Marital status:    Occupational History    Occupation: pharmacy tech   Tobacco Use    Smoking status: Former     Current packs/day: 0.50     Average packs/day: 0.5 packs/day for 3.0 years (1.5 ttl pk-yrs)     Types: Cigarettes     Passive exposure: Never    Smokeless tobacco: Never    Tobacco comments:     quit in    Vaping Use    Vaping status: Never Used   Substance and Sexual Activity    Alcohol use: Not Currently    Drug use: No    Sexual activity: Yes     Partners: Male     Birth control/protection: Mirena, I.U.D.   Other Topics Concern    Caffeine Concern Yes    Exercise Yes    Seat Belt Yes        REVIEW OF SYSTEMS:   See HPI     EXAM:     /72 (BP Location: Left arm, Patient Position: Sitting, Cuff Size: large)   Pulse 74   Temp 97.3 °F (36.3 °C) (Temporal)   Resp 16   Ht 5' 9\" (1.753 m)   Wt 242 lb 6.4 oz (110 kg)   LMP 2024 (Approximate)   SpO2 99%   BMI 35.80 kg/m²   Body mass index is 35.8 kg/m².   GENERAL: well developed, well nourished,in no apparent distress  HEENT: atraumatic, normocephalic, Left ear is with effusion- no erythema. Right TM clear.  EYES: conjunctiva are clear  NECK: supple,no adenopathy    LUNGS: clear to auscultation; no rhonchi, rales, or wheezing  CARDIO: RRR without murmur  GI: no masses, organomegaly or tenderness  MUSCULOSKELETAL: Normal gait.  EXTREMITIES: no edema  NEURO: Oriented times three    LABS:      Lab Results   Component Value Date    WBC 8.2 2024    RBC 4.24 2024    HGB 13.0 2024    HCT 38.0 2024    MCV 89.6 2024    MCH 30.7 2024    MCHC 34.2 2024    RDW 12.7 2024    .0 2024    MPV 10.4 10/22/2012      Lab Results   Component Value Date    GLU 82 2024    BUN 12 2024    BUNCREA 13.2 2024    CREATSERUM 0.91 2024    ANIONGAP 8 2024      10/09/2016    GFRNAA 120 05/25/2022    GFRAA 138 05/25/2022    CA 9.2 08/31/2024    OSMOCALC 293 08/31/2024    ALKPHO 59 08/31/2024    AST 13 08/31/2024    ALT <7 (L) 08/31/2024    BILT 0.4 08/31/2024    TP 6.9 08/31/2024    ALB 4.2 08/31/2024    GLOBULIN 2.7 08/31/2024     08/31/2024    K 4.0 08/31/2024     08/31/2024    CO2 23.0 08/31/2024      Lab Results   Component Value Date    CHOLEST 131 08/31/2024    TRIG 123 08/31/2024    HDL 33 (L) 08/31/2024    LDL 76 08/31/2024    VLDL 19 08/31/2024    NONHDLC 98 08/31/2024      Lab Results   Component Value Date    TSH 0.987 08/31/2024      Lab Results   Component Value Date     08/09/2022    A1C 5.5 08/09/2022          ASSESSMENT AND PLAN:   1. Dysfunction of left eustachian tube  - the patient has left ear hearing sensitivity, decreased hearing and clogged sensation. Effusion on exam. Right TM normal.  - start flonase, if no improvement start claratin  - do ear exercises  - if still no improvement see ENT as needed  - fluticasone propionate 50 MCG/ACT Nasal Suspension; 2 sprays by Each Nare route daily.  Dispense: 48 g; Refill: 0  - ENT - INTERNAL     The patient indicates understanding of these issues and agrees to the plan.  The patient is asked to return as needed or when routine care is due.

## 2024-12-09 ENCOUNTER — IMMUNIZATION (OUTPATIENT)
Dept: LAB | Age: 29
End: 2024-12-09
Attending: EMERGENCY MEDICINE
Payer: COMMERCIAL

## 2024-12-09 DIAGNOSIS — Z23 NEED FOR VACCINATION: Primary | ICD-10-CM

## 2024-12-09 PROCEDURE — 90656 IIV3 VACC NO PRSV 0.5 ML IM: CPT

## 2024-12-09 PROCEDURE — 90471 IMMUNIZATION ADMIN: CPT

## 2024-12-12 ENCOUNTER — TELEPHONE (OUTPATIENT)
Dept: OBGYN CLINIC | Facility: CLINIC | Age: 29
End: 2024-12-12

## 2024-12-12 DIAGNOSIS — N76.0 BV (BACTERIAL VAGINOSIS): Primary | ICD-10-CM

## 2024-12-12 DIAGNOSIS — B96.89 BV (BACTERIAL VAGINOSIS): Primary | ICD-10-CM

## 2024-12-12 RX ORDER — METRONIDAZOLE 500 MG/1
500 TABLET ORAL 2 TIMES DAILY
Qty: 14 TABLET | Refills: 0 | Status: SHIPPED | OUTPATIENT
Start: 2024-12-12 | End: 2024-12-19

## 2024-12-12 NOTE — TELEPHONE ENCOUNTER
Pt called asking to speak to a nurse. Pt states she is currently having itching, odor, and discharge occurring. Pt believes its the bacterial vaginosis. Please advise.

## 2024-12-12 NOTE — TELEPHONE ENCOUNTER
Called and spoke with pt.    Pt. started having BV symptoms last week., has a hx. Of BV per pt.  Symptoms include watery discharge that has a foul odor ..fishy. Itchy vaginal area.  Current on visits...last seen 6/24 WWE Dr. Jean  Pt. Has used flagyl before with good results.  Please advise.  Thanks

## 2024-12-12 NOTE — TELEPHONE ENCOUNTER
Called and spoke with pt. And reviewed recommendations from  also recommended no alcohol consumption during treatment and for 24 hour after treatment has been completed.with this medication.  To call back and speak with nurses if this infection does not clear up.  Pt. Verbalizes understanding of information and agrees to plan.

## 2025-01-09 ENCOUNTER — OFFICE VISIT (OUTPATIENT)
Dept: INTERNAL MEDICINE CLINIC | Facility: CLINIC | Age: 30
End: 2025-01-09
Payer: COMMERCIAL

## 2025-01-09 VITALS
TEMPERATURE: 98 F | HEART RATE: 57 BPM | WEIGHT: 238 LBS | BODY MASS INDEX: 35.25 KG/M2 | DIASTOLIC BLOOD PRESSURE: 84 MMHG | RESPIRATION RATE: 14 BRPM | HEIGHT: 69 IN | OXYGEN SATURATION: 97 % | SYSTOLIC BLOOD PRESSURE: 120 MMHG

## 2025-01-09 DIAGNOSIS — Z20.818 STREP THROAT EXPOSURE: Primary | ICD-10-CM

## 2025-01-09 PROCEDURE — 3008F BODY MASS INDEX DOCD: CPT | Performed by: STUDENT IN AN ORGANIZED HEALTH CARE EDUCATION/TRAINING PROGRAM

## 2025-01-09 PROCEDURE — 3074F SYST BP LT 130 MM HG: CPT | Performed by: STUDENT IN AN ORGANIZED HEALTH CARE EDUCATION/TRAINING PROGRAM

## 2025-01-09 PROCEDURE — 99213 OFFICE O/P EST LOW 20 MIN: CPT | Performed by: STUDENT IN AN ORGANIZED HEALTH CARE EDUCATION/TRAINING PROGRAM

## 2025-01-09 PROCEDURE — 3079F DIAST BP 80-89 MM HG: CPT | Performed by: STUDENT IN AN ORGANIZED HEALTH CARE EDUCATION/TRAINING PROGRAM

## 2025-01-09 NOTE — PROGRESS NOTES
OFFICE NOTE     Patient ID: Elizabeth Eli is a 29 year old female.  Today's Date: 01/09/25  Chief Complaint: Sore Throat (Pt c/o of sore throat and L ear ache that started on Monday. Pt's  just tested positive for strep.)    Patient presents to the office today complaining of a sore throat since Monday (4 days).  Her  was recently seen in the clinic and was diagnosed with strep throat with a positive rapid strep.  Patient is having similar symptoms to her  with a sore throat, left ear pain, swelling of her lymph nodes in the neck.  She has been taking NyQuil, DayQuil and ibuprofen to help with symptoms that she was feeling feverish and shaky at times.    No nasal congestion, no visual changes, no shortness of breath, chest pain, palpitations, abdominal pain, nausea vomiting, diarrhea constipation.      Vitals:    01/09/25 1323   BP: 120/84   Pulse: 57   Resp: 14   Temp: 98.4 °F (36.9 °C)   TempSrc: Temporal   SpO2: 97%   Weight: 238 lb (108 kg)   Height: 5' 9\" (1.753 m)     body mass index is 35.15 kg/m².  BP Readings from Last 3 Encounters:   01/09/25 120/84   12/07/24 116/72   10/28/24 114/64     The ASCVD Risk score (Hernandez LOPEZ, et al., 2019) failed to calculate for the following reasons:    The 2019 ASCVD risk score is only valid for ages 40 to 79      Medications reviewed:  Current Outpatient Medications   Medication Sig Dispense Refill    amoxicillin clavulanate 875-125 MG Oral Tab Take 1 tablet by mouth 2 (two) times daily for 7 days. 14 tablet 0    fluticasone propionate 50 MCG/ACT Nasal Suspension 2 sprays by Each Nare route daily. 48 g 0    Tirzepatide-Weight Management (ZEPBOUND) 15 MG/0.5ML Subcutaneous Solution Auto-injector Inject 15 mg into the skin once a week. 6 mL 1    Rizatriptan Benzoate 10 MG Oral Tab use at onset; may repeat once after 2 hours- ONLY 2 IN 24 HOUR PERIOD MAX.  This is a 30 day supply. 12 tablet 11    ibuprofen (MOTRIN IB) 200 MG Oral Tab Take 4  tablets (800 mg total) by mouth as needed for Pain.      Levonorgestrel (MIRENA, 52 MG, IU) by Intrauterine route.      acetaminophen 500 MG Oral Tab Take 1 tablet (500 mg total) by mouth every 6 (six) hours as needed for Pain.      valACYclovir 500 MG Oral Tab Take by mouth as needed.      cetirizine 10 MG Oral Tab Take 1 tablet (10 mg total) by mouth daily as needed.           Assessment & Plan    1. Strep throat exposure (Primary)  Patient's  tested positive for strep throat earlier today.  Patient is having similar symptoms to her .  I suspect that she is also has a strep throat and will empirically treat her with antibiotics for 7 days.  Recommend to take probiotic.  Recommend to avoid close contact with children at home and if any 1 develops symptoms-recommend treatment with antibiotics as well.  -     Amoxicillin-Pot Clavulanate; Take 1 tablet by mouth 2 (two) times daily for 7 days.  Dispense: 14 tablet; Refill: 0      Follow Up: As needed/if symptoms worsen     Objective/ Results:   Physical Exam  Constitutional:       General: She is not in acute distress.     Appearance: Normal appearance. She is not ill-appearing or toxic-appearing.   HENT:      Head: Normocephalic and atraumatic.      Right Ear: Tympanic membrane, ear canal and external ear normal.      Left Ear: Tympanic membrane, ear canal and external ear normal.      Mouth/Throat:      Mouth: Mucous membranes are moist.      Tongue: No lesions. Tongue does not deviate from midline.      Pharynx: Posterior oropharyngeal erythema present.      Tonsils: No tonsillar exudate or tonsillar abscesses. 2+ on the right. 2+ on the left.   Eyes:      Extraocular Movements: Extraocular movements intact.      Conjunctiva/sclera: Conjunctivae normal.      Pupils: Pupils are equal, round, and reactive to light.   Cardiovascular:      Rate and Rhythm: Normal rate and regular rhythm.      Pulses: Normal pulses.      Heart sounds: Normal heart sounds.  No murmur heard.     No friction rub.   Pulmonary:      Effort: Pulmonary effort is normal. No respiratory distress.      Breath sounds: Normal breath sounds. No wheezing or rales.   Neurological:      Mental Status: She is alert.        Reviewed:    Patient Active Problem List    Diagnosis    Family history of breast cancer    IUD (intrauterine device) in place     Mirena 8/30/2022 6/2024 - strings not visualized, confirmed in place by US      Genital HSV     [ x ] Valtrex 36 weeks.      Encounter for therapeutic drug monitoring    Class 2 obesity due to excess calories without serious comorbidity with body mass index (BMI) of 37.0 to 37.9 in adult    Migraines      Allergies[1]     Social History     Socioeconomic History    Marital status:    Occupational History    Occupation: pharmacy tech   Tobacco Use    Smoking status: Former     Current packs/day: 0.50     Average packs/day: 0.5 packs/day for 3.0 years (1.5 ttl pk-yrs)     Types: Cigarettes     Passive exposure: Never    Smokeless tobacco: Never    Tobacco comments:     quit in 2014   Vaping Use    Vaping status: Never Used   Substance and Sexual Activity    Alcohol use: Not Currently    Drug use: No    Sexual activity: Yes     Partners: Male     Birth control/protection: Mirena, I.U.D.   Other Topics Concern    Caffeine Concern Yes    Exercise Yes    Seat Belt Yes      Review of Systems   Constitutional:  Positive for activity change, chills and fatigue.   HENT:  Positive for sore throat. Negative for congestion, postnasal drip, sinus pain, tinnitus, trouble swallowing and voice change.    Eyes: Negative.    Respiratory:  Negative for cough, choking, shortness of breath and wheezing.    Cardiovascular:  Negative for chest pain.   Gastrointestinal:  Negative for diarrhea, nausea and vomiting.   Genitourinary: Negative.    Musculoskeletal: Negative.    Skin: Negative.    Hematological:  Positive for adenopathy.     All other systems negative unless  otherwise stated in ROS or HPI above.       Bri Martinez MD  Internal Medicine       Call office with any questions or seek emergency care if necessary.   Patient understands and agrees to follow directions and advice.      ----------------------------------------- PATIENT INSTRUCTIONS-----------------------------------------     There are no Patient Instructions on file for this visit.        The 21st Century Cures Act makes medical notes available to patients in the interest of transparency.  However, please be advised that this is a medical document.  It is intended as a peer to peer communication.  It is written in medical language and may contain abbreviations or verbiage that are technical and unfamiliar.  It may appear blunt or direct.  Medical documents are intended to carry relevant information, facts as evident, and the clinical opinion of the practitioner.          [1]   Allergies  Allergen Reactions    Latex RASH

## 2025-01-15 ENCOUNTER — PATIENT MESSAGE (OUTPATIENT)
Facility: CLINIC | Age: 30
End: 2025-01-15

## 2025-01-22 ENCOUNTER — PATIENT MESSAGE (OUTPATIENT)
Dept: OBGYN CLINIC | Facility: CLINIC | Age: 30
End: 2025-01-22

## 2025-01-22 DIAGNOSIS — Z12.31 ENCOUNTER FOR SCREENING MAMMOGRAM FOR MALIGNANT NEOPLASM OF BREAST: Primary | ICD-10-CM

## 2025-01-22 NOTE — TELEPHONE ENCOUNTER
Annual screening mammogram order placed per protocol / recommendation on last diagnostic mammogram. LOV 6/7/24. Provided number for central scheduling and asked patient to schedule office visit if she has a breast concern at this time.

## 2025-02-17 ENCOUNTER — OFFICE VISIT (OUTPATIENT)
Facility: CLINIC | Age: 30
End: 2025-02-17
Payer: COMMERCIAL

## 2025-02-17 VITALS
OXYGEN SATURATION: 100 % | WEIGHT: 233 LBS | HEIGHT: 69 IN | SYSTOLIC BLOOD PRESSURE: 130 MMHG | RESPIRATION RATE: 18 BRPM | HEART RATE: 74 BPM | BODY MASS INDEX: 34.51 KG/M2 | DIASTOLIC BLOOD PRESSURE: 70 MMHG

## 2025-02-17 DIAGNOSIS — E88.819 INSULIN RESISTANCE: ICD-10-CM

## 2025-02-17 DIAGNOSIS — E55.9 VITAMIN D DEFICIENCY: ICD-10-CM

## 2025-02-17 DIAGNOSIS — E66.812 CLASS 2 OBESITY WITH BODY MASS INDEX (BMI) OF 39.0 TO 39.9 IN ADULT, UNSPECIFIED OBESITY TYPE, UNSPECIFIED WHETHER SERIOUS COMORBIDITY PRESENT: ICD-10-CM

## 2025-02-17 DIAGNOSIS — Z51.81 ENCOUNTER FOR THERAPEUTIC DRUG MONITORING: Primary | ICD-10-CM

## 2025-02-17 DIAGNOSIS — E88.810 METABOLIC SYNDROME: ICD-10-CM

## 2025-02-17 DIAGNOSIS — E78.5 HYPERLIPIDEMIA, UNSPECIFIED HYPERLIPIDEMIA TYPE: ICD-10-CM

## 2025-02-17 PROCEDURE — 3075F SYST BP GE 130 - 139MM HG: CPT | Performed by: PHYSICIAN ASSISTANT

## 2025-02-17 PROCEDURE — 3078F DIAST BP <80 MM HG: CPT | Performed by: PHYSICIAN ASSISTANT

## 2025-02-17 PROCEDURE — 3008F BODY MASS INDEX DOCD: CPT | Performed by: PHYSICIAN ASSISTANT

## 2025-02-17 PROCEDURE — 99214 OFFICE O/P EST MOD 30 MIN: CPT | Performed by: PHYSICIAN ASSISTANT

## 2025-02-17 RX ORDER — TIRZEPATIDE 15 MG/.5ML
15 INJECTION, SOLUTION SUBCUTANEOUS WEEKLY
Qty: 6 ML | Refills: 1 | Status: SHIPPED | OUTPATIENT
Start: 2025-02-17

## 2025-02-17 NOTE — PROGRESS NOTES
HISTORY OF PRESENT ILLNESS  Chief Complaint   Patient presents with    Weight Check     -11lbs       Elizabeth Eli is a 30 year old female here for follow up in medical weight loss program.   -11lbs  Compliant on zepbound  Denies chest pain, shortness of breath, dizziness, blurred vision, headache, paresthesia, nausea/vomiting.   Holidays were a struggle, but did better than she normally does  Saw herself falling into old habits, but was able to catch herself and get back on track  Prioritizing protein    Exercise/Activity: has been doing more home exercise routine, still going to gym on the weekends, just dance, [place], did a mile on the treadmill, wants to be able to do a 5K by 2026  Nutrition: 24 hour food log reviewed, eating regular meals, +protein  Stress: ebbs and flows, finding some new coping skills, crocheting  Sleep: has been good, no problems    [facility] Follow Up    General Information  Nutrition Recall  Exercise     Sleep              Wt Readings from Last 6 Encounters:   02/18/25 233 lb (105.7 kg)   02/17/25 233 lb (105.7 kg)   01/09/25 238 lb (108 kg)   12/07/24 242 lb 6.4 oz (110 kg)   10/28/24 246 lb 9.6 oz (111.9 kg)   10/20/24 251 lb (113.9 kg)            Breakfast Lunch Dinner Snacks Fluids   Reviewed           REVIEW OF SYSTEMS  GENERAL HEALTH: feels well otherwise, denied any fevers chills or night sweats   RESPIRATORY: denies shortness of breath   CARDIOVASCULAR: denies chest pain  GI: denies abdominal pain    EXAM  /70   Pulse 74   Resp 18   Ht 5' 9\" (1.753 m)   Wt 233 lb (105.7 kg)   LMP 11/28/2024 (Approximate)   SpO2 100%   BMI 34.41 kg/m²   GENERAL: well developed, well nourished,in no apparent distress, A/O x3  SKIN: no rashes,no suspicious lesions  HEENT: atraumatic, normocephalic, OP-clear, PERRL  NECK: supple,no adenopathy  LUNGS: clear to auscultation bilaterally   CARDIO: RRR without murmur  GI: good BS's,NT/ND, no masses or HSM  EXTREMITIES: no cyanosis, no clubbing, no  edema    Lab Results   Component Value Date    WBC 8.2 08/31/2024    RBC 4.24 08/31/2024    HGB 13.0 08/31/2024    HCT 38.0 08/31/2024    MCV 89.6 08/31/2024    MCH 30.7 08/31/2024    MCHC 34.2 08/31/2024    RDW 12.7 08/31/2024    .0 08/31/2024    MPV 10.4 10/22/2012     Lab Results   Component Value Date    GLU 82 08/31/2024    BUN 12 08/31/2024    BUNCREA 13.2 08/31/2024    CREATSERUM 0.91 08/31/2024    ANIONGAP 8 08/31/2024     10/09/2016    GFRNAA 120 05/25/2022    GFRAA 138 05/25/2022    CA 9.2 08/31/2024    OSMOCALC 293 08/31/2024    ALKPHO 59 08/31/2024    AST 13 08/31/2024    ALT <7 (L) 08/31/2024    BILT 0.4 08/31/2024    TP 6.9 08/31/2024    ALB 4.2 08/31/2024    GLOBULIN 2.7 08/31/2024     08/31/2024    K 4.0 08/31/2024     08/31/2024    CO2 23.0 08/31/2024     Lab Results   Component Value Date     08/09/2022    A1C 5.5 08/09/2022     Lab Results   Component Value Date    CHOLEST 131 08/31/2024    TRIG 123 08/31/2024    HDL 33 (L) 08/31/2024    LDL 76 08/31/2024    VLDL 19 08/31/2024    NONHDLC 98 08/31/2024     Lab Results   Component Value Date    TSH 0.987 08/31/2024     Lab Results   Component Value Date    B12 461 10/04/2022     Lab Results   Component Value Date    VITD 23.0 (L) 10/04/2022       Medications Ordered Prior to Encounter[1]    ASSESSMENT  Analyzed weight data:       Diagnoses and all orders for this visit:    Encounter for therapeutic drug monitoring  -     Tirzepatide-Weight Management (ZEPBOUND) 15 MG/0.5ML Subcutaneous Solution Auto-injector; Inject 15 mg into the skin once a week.    Class 2 obesity with body mass index (BMI) of 39.0 to 39.9 in adult, unspecified obesity type, unspecified whether serious comorbidity present  -     Tirzepatide-Weight Management (ZEPBOUND) 15 MG/0.5ML Subcutaneous Solution Auto-injector; Inject 15 mg into the skin once a week.    Hyperlipidemia, unspecified hyperlipidemia type  -     Tirzepatide-Weight Management  (ZEPBOUND) 15 MG/0.5ML Subcutaneous Solution Auto-injector; Inject 15 mg into the skin once a week.    Insulin resistance  -     Tirzepatide-Weight Management (ZEPBOUND) 15 MG/0.5ML Subcutaneous Solution Auto-injector; Inject 15 mg into the skin once a week.    Metabolic syndrome  -     Tirzepatide-Weight Management (ZEPBOUND) 15 MG/0.5ML Subcutaneous Solution Auto-injector; Inject 15 mg into the skin once a week.    Vitamin D deficiency        PLAN  Initial Weight: 328lbs  Initial Weight Date: 6/16/23  Today's Weight: 233lbs  5% Goal: 16.4lbs  10% Goal: 32.8lbs   Total Weight Loss: -11lbs/Net loss 95lb    Will continue zepbound - advised side effects and adverse effects of medication   HLD - lifestyle changes  Insulin resistance - continue current medications, low carb diet  Vit D supplement, take with food  Consistency with logging foods - protein and produce  Continue to work on increasing strength/resistance training  Nutrition: low carb diet/ recommended to eat breakfast daily/ regular protein intake  Medication use and side effects reviewed with patient.  Medication contraindications: phentermine, topamax  Follow up with dietitian and psychologist as recommended.  Discussed the role of sleep and stress in weight management.  Counseled on comprehensive weight loss plan including attention to nutrition, exercise and behavior/stress management for success. See patient instruction below for more details.  Discussed strategies to overcome barriers to successful weight loss and weight maintenance  FITTE: ACSM recommendations (150-300 minutes/ week in active weight loss)   Weight Loss consent to treat reviewed and signed       NOTE TO PATIENT: The 21st Century Cures Act makes clinical notes like these available to patients in the interest of transparency. Clinical notes are medical documents used by physicians and care providers to communicate with each other. These documents include medical language and terminology,  abbreviations, and treatment information that may sound technical and at times possibly unfamiliar. In addition, at times, the verbiage may appear blunt or direct. These documents are one tool providers use to communicate relevant information and clinical opinions of the care providers in a way that allows common understanding of the clinical context.     There are no Patient Instructions on file for this visit.    No follow-ups on file.    Patient verbalizes understanding.    Nimco Mark PA-C  2/17/2025           [1]   Current Outpatient Medications on File Prior to Visit   Medication Sig Dispense Refill    fluticasone propionate 50 MCG/ACT Nasal Suspension 2 sprays by Each Nare route daily. 48 g 0    Rizatriptan Benzoate 10 MG Oral Tab use at onset; may repeat once after 2 hours- ONLY 2 IN 24 HOUR PERIOD MAX.  This is a 30 day supply. 12 tablet 11    ibuprofen (MOTRIN IB) 200 MG Oral Tab Take 4 tablets (800 mg total) by mouth as needed for Pain.      Levonorgestrel (MIRENA, 52 MG, IU) by Intrauterine route.      acetaminophen 500 MG Oral Tab Take 1 tablet (500 mg total) by mouth every 6 (six) hours as needed for Pain.      cetirizine 10 MG Oral Tab Take 1 tablet (10 mg total) by mouth daily as needed.       No current facility-administered medications on file prior to visit.

## 2025-02-18 ENCOUNTER — OFFICE VISIT (OUTPATIENT)
Dept: FAMILY MEDICINE CLINIC | Facility: CLINIC | Age: 30
End: 2025-02-18
Payer: COMMERCIAL

## 2025-02-18 VITALS
SYSTOLIC BLOOD PRESSURE: 108 MMHG | WEIGHT: 233 LBS | BODY MASS INDEX: 34.51 KG/M2 | HEART RATE: 109 BPM | DIASTOLIC BLOOD PRESSURE: 62 MMHG | RESPIRATION RATE: 16 BRPM | OXYGEN SATURATION: 97 % | TEMPERATURE: 98 F | HEIGHT: 69 IN

## 2025-02-18 DIAGNOSIS — J02.9 ACUTE PHARYNGITIS, UNSPECIFIED ETIOLOGY: Primary | ICD-10-CM

## 2025-02-18 LAB
CONTROL LINE PRESENT WITH A CLEAR BACKGROUND (YES/NO): YES YES/NO
KIT LOT #: NORMAL NUMERIC
STREP GRP A CUL-SCR: NEGATIVE

## 2025-02-18 PROCEDURE — 99213 OFFICE O/P EST LOW 20 MIN: CPT | Performed by: NURSE PRACTITIONER

## 2025-02-18 PROCEDURE — 87880 STREP A ASSAY W/OPTIC: CPT | Performed by: NURSE PRACTITIONER

## 2025-02-18 PROCEDURE — 87081 CULTURE SCREEN ONLY: CPT | Performed by: NURSE PRACTITIONER

## 2025-02-18 PROCEDURE — 3074F SYST BP LT 130 MM HG: CPT | Performed by: NURSE PRACTITIONER

## 2025-02-18 PROCEDURE — 3008F BODY MASS INDEX DOCD: CPT | Performed by: NURSE PRACTITIONER

## 2025-02-18 PROCEDURE — 3078F DIAST BP <80 MM HG: CPT | Performed by: NURSE PRACTITIONER

## 2025-02-18 NOTE — PATIENT INSTRUCTIONS
-Strep throat culture sent to lab  -Push fluids and plenty of rest    -OTC Tylenol/Ibuprofen as packet insert If no allergies  -Soothing cough drops as packet insert   -Good handwashing, to prevent spread of virus    -Face mask helps prevent viral infections    Follow up in 3-5 days for worsening symptoms with clinic or PCP

## 2025-02-18 NOTE — PROGRESS NOTES
CHIEF COMPLAINT:     Chief Complaint   Patient presents with    Sore Throat     Started yesterday; exposure to strep        HPI:   Elizabeth Eli is a 29 year old female presents to clinic with symptoms of sore throat. Patient has had for 1 day. Symptoms have mild since onset.    Patient reports: sore throat only.  Denies fever, chills, headache, stomach upset, nasal congestion, cough, ear pain, or rash.  Has + history of strep throat;  + known strep exposure.   No FLU or  COVID exposure   Treating symptoms with Nyquil.    Current Outpatient Medications   Medication Sig Dispense Refill    Tirzepatide-Weight Management (ZEPBOUND) 15 MG/0.5ML Subcutaneous Solution Auto-injector Inject 15 mg into the skin once a week. 6 mL 1    fluticasone propionate 50 MCG/ACT Nasal Suspension 2 sprays by Each Nare route daily. 48 g 0    Rizatriptan Benzoate 10 MG Oral Tab use at onset; may repeat once after 2 hours- ONLY 2 IN 24 HOUR PERIOD MAX.  This is a 30 day supply. 12 tablet 11    ibuprofen (MOTRIN IB) 200 MG Oral Tab Take 4 tablets (800 mg total) by mouth as needed for Pain.      Levonorgestrel (MIRENA, 52 MG, IU) by Intrauterine route.      acetaminophen 500 MG Oral Tab Take 1 tablet (500 mg total) by mouth every 6 (six) hours as needed for Pain.      valACYclovir 500 MG Oral Tab Take by mouth as needed.      cetirizine 10 MG Oral Tab Take 1 tablet (10 mg total) by mouth daily as needed.        Past Medical History:    Abnormal uterine bleeding    spotting and bleeding X 2    Allergic rhinitis    Anxiety state, unspecified    Arthritis    Asthma ([facility])    Bacterial vaginosis    Chronic back pain    Depression    Hx in high school; no medications    Gestational hypertension, third trimester ([facility])    Group B streptococcal infection    Herpes    Takes daily Acyclovir; last outbreak 3 weeks ago; no active lesions, plan for vaginal delivery    Herpes    taking acyclovir BID , states last outbreak 1 year ago     Miscarriage ([facility])     Pap smear for cervical cancer screening    wnl    Pneumonia due to organism    Pregnancy-induced hypertension (HCC)    Hx PIH w/ 1st two pregnancies, IOL @ 39.4 and 36.6 wks    S/P tonsillectomy     (spontaneous vaginal delivery) (HCC)      Social History:  Social History     Socioeconomic History    Marital status:    Occupational History    Occupation: pharmacy tech   Tobacco Use    Smoking status: Former     Current packs/day: 0.50     Average packs/day: 0.5 packs/day for 3.0 years (1.5 ttl pk-yrs)     Types: Cigarettes     Passive exposure: Never    Smokeless tobacco: Never    Tobacco comments:     quit in    Vaping Use    Vaping status: Never Used   Substance and Sexual Activity    Alcohol use: Not Currently    Drug use: No    Sexual activity: Yes     Partners: Male     Birth control/protection: Mirena, I.U.D.   Other Topics Concern    Caffeine Concern Yes    Exercise Yes    Seat Belt Yes        REVIEW OF SYSTEMS:   GENERAL HEALTH:  See HPI  SKIN: denies any unusual skin lesions or rashes  HEENT: denies ear pain, See HPI  RESPIRATORY: denies shortness of breath or wheezing  CARDIOVASCULAR: denies chest pain, palpitations   GI: denies abdominal pain, constipation and diarrhea  NEURO: denies dizziness or lightheadedness    EXAM:   /62   Pulse 109   Temp 98.3 °F (36.8 °C) (Oral)   Resp 16   Ht 5' 9\" (1.753 m)   Wt 233 lb (105.7 kg)   LMP 2024 (Approximate)   SpO2 97%   BMI 34.41 kg/m²   GENERAL: well developed, well nourished, in no apparent distress  SKIN: no rashes,no suspicious lesions  HEAD: atraumatic, normocephalic  EYES: conjunctiva clear  EARS: TM's clear, non-injected, no bulging, retraction, or fluid  NOSE: nostrils patent, no nasal mucus, nasal mucosa pink and noninflamed  THROAT: oral mucosa pink, moist. Posterior pharynx mildly erythematous and injected. No exudates. Tonsils surgically removed.  Uvula is midline.  No trismus, hoarseness, muffled voice, or stridor.     NECK: supple  LUNGS: clear to auscultation bilaterally. Breathing is non labored.  CARDIO: RRR without murmur  EXTREMITIES: no cyanosis, clubbing or edema  LYMPH: + anterior cervical lymphadenopathy.  No posterior cervical or occipital lymphadenopathy.    Recent Results (from the past 24 hours)   Rapid Strep    Collection Time: 02/18/25  6:57 AM   Result Value Ref Range    Strep Grp A Screen negative Negative    Control Line Present with a clear background (yes/no) yes Yes/No    Kit Lot # [id number] Numeric    Kit Expiration Date 11/4/2025 Date       ASSESSMENT AND PLAN:   Assessment:   Encounter Diagnosis   Name Primary?    Acute pharyngitis, unspecified etiology Yes         Plan:  Discussed likely viral etiology  Will send throat culture.  Comfort measures explained and discussed as listed in Patient Instructions  Follow up in 3-5 days if not improving, condition worsens, or fever greater than or equal to 100.4 persists for 72 hours.    Verbalized understanding.              Meds & Refills for this Visit:  Requested Prescriptions      No prescriptions requested or ordered in this encounter           Patient Instructions   -Strep throat culture sent to lab  -Push fluids and plenty of rest    -OTC Tylenol/Ibuprofen as packet insert If no allergies  -Soothing cough drops as packet insert   -Good handwashing, to prevent spread of virus    -Face mask helps prevent viral infections    Follow up in 3-5 days for worsening symptoms with clinic or PCP     .

## 2025-02-20 ENCOUNTER — PATIENT MESSAGE (OUTPATIENT)
Dept: OBGYN CLINIC | Facility: CLINIC | Age: 30
End: 2025-02-20

## 2025-02-21 RX ORDER — VALACYCLOVIR HYDROCHLORIDE 500 MG/1
500 TABLET, FILM COATED ORAL DAILY
Qty: 90 TABLET | Refills: 3 | Status: SHIPPED | OUTPATIENT
Start: 2025-02-21

## 2025-02-21 NOTE — TELEPHONE ENCOUNTER
Patient notified that refill has been sent.  Patient confirms that she only uses it to treat outbreaks- does not require daily suppression at this time.

## 2025-02-24 ENCOUNTER — TELEMEDICINE (OUTPATIENT)
Dept: INTERNAL MEDICINE CLINIC | Facility: CLINIC | Age: 30
End: 2025-02-24
Payer: COMMERCIAL

## 2025-02-24 DIAGNOSIS — J01.41 ACUTE RECURRENT PANSINUSITIS: Primary | ICD-10-CM

## 2025-02-24 RX ORDER — DOXYCYCLINE 100 MG/1
100 CAPSULE ORAL 2 TIMES DAILY
Qty: 14 CAPSULE | Refills: 0 | Status: SHIPPED | OUTPATIENT
Start: 2025-02-24 | End: 2025-03-03

## 2025-02-24 NOTE — PATIENT INSTRUCTIONS
Take antibiotic completely as ordered     Take antibiotic with food    Eat yogurt twice a day while on antibiotic or take an oral probiotic    Monitor for diarrhea, side effects, allergic reaction    If you have a mild allergic reaction take benadryl and call the office. If it is severe and you have lip or throat swelling or trouble breathing go to the ER or call 911    Follow up in one week as needed or when routine care is due

## 2025-02-24 NOTE — PROGRESS NOTES
Virtual video Check-In    Elizabeth Eli verbally consents to a Virtual/video Check-In visit on 02/24/25.  Patient has been referred to the [facility] website at [email or web address] to review the yearly Consent to Treat document.    Patient understands and accepts financial responsibility for any deductible, co-insurance and/or co-pays associated with this service.    Duration of the service: 20 minutes      Elizabeth Eli is a 30 year old female.  CHIEF COMPLAINT   Sinus issue    HPI:   Started Monday with drainage. Strep negative. Sinus pain started Thursday and worsened. Lots of congestion.   Pain is 7/10.    Current Outpatient Medications   Medication Sig Dispense Refill    valACYclovir 500 MG Oral Tab Take 1 tablet (500 mg total) by mouth daily. 90 tablet 3    Tirzepatide-Weight Management (ZEPBOUND) 15 MG/0.5ML Subcutaneous Solution Auto-injector Inject 15 mg into the skin once a week. 6 mL 1    fluticasone propionate 50 MCG/ACT Nasal Suspension 2 sprays by Each Nare route daily. 48 g 0    Rizatriptan Benzoate 10 MG Oral Tab use at onset; may repeat once after 2 hours- ONLY 2 IN 24 HOUR PERIOD MAX.  This is a 30 day supply. 12 tablet 11    ibuprofen (MOTRIN IB) 200 MG Oral Tab Take 4 tablets (800 mg total) by mouth as needed for Pain.      Levonorgestrel (MIRENA, 52 MG, IU) by Intrauterine route.      acetaminophen 500 MG Oral Tab Take 1 tablet (500 mg total) by mouth every 6 (six) hours as needed for Pain.      cetirizine 10 MG Oral Tab Take 1 tablet (10 mg total) by mouth daily as needed.        Past Medical History:    Abnormal uterine bleeding    spotting and bleeding X 2    Allergic rhinitis    Anxiety state, unspecified    Arthritis    Asthma (HCC)    Bacterial vaginosis    Chronic back pain    Depression    Hx in high school; no medications    Gestational hypertension, third trimester ([facility])    Group B streptococcal infection    Herpes    Takes daily Acyclovir; last outbreak 3 weeks ago; no active  lesions, plan for vaginal delivery    Herpes    taking acyclovir BID , states last outbreak 1 year ago     Miscarriage ([facility])    Pap smear for cervical cancer screening    wnl    Pneumonia due to organism    Pregnancy-induced hypertension (HCC)    Hx PIH w/ 1st two pregnancies, IOL @ 39.4 and 36.6 wks    S/P tonsillectomy     (spontaneous vaginal delivery) ([facility])      Social History:  Social History     Socioeconomic History    Marital status:    Occupational History    Occupation: pharmacy tech   Tobacco Use    Smoking status: Former     Current packs/day: 0.50     Average packs/day: 0.5 packs/day for 3.0 years (1.5 ttl pk-yrs)     Types: Cigarettes     Passive exposure: Never    Smokeless tobacco: Never    Tobacco comments:     quit in    Vaping Use    Vaping status: Never Used   Substance and Sexual Activity    Alcohol use: Not Currently    Drug use: No    Sexual activity: Yes     Partners: Male     Birth control/protection: Mirena, I.U.D.   Other Topics Concern    Caffeine Concern Yes    Exercise Yes    Seat Belt Yes        REVIEW OF SYSTEMS:   See HPI    EXAM:   Limited due to video visit  GENERAL: does not sound like they are in any acute distress on the video  HEENT: sinus pain with palpation   LUNGS: They are able to phonate clearly without pausing due to sob, there was no coughing during the visit.  NEURO: Oriented times three      LABS:      Lab Results   Component Value Date    WBC 8.2 2024    RBC 4.24 2024    HGB 13.0 2024    HCT 38.0 2024    MCV 89.6 2024    MCH 30.7 2024    MCHC 34.2 2024    RDW 12.7 2024    .0 2024    MPV 10.4 10/22/2012      Lab Results   Component Value Date    GLU 82 2024    BUN 12 2024    BUNCREA 13.2 2024    CREATSERUM 0.91 2024    ANIONGAP 8 2024     10/09/2016    GFRNAA 120 2022    GFRAA 138 2022    CA 9.2 2024    OSMOCALC 293 2024    ALKPHO 59  08/31/2024    AST 13 08/31/2024    ALT <7 (L) 08/31/2024    BILT 0.4 08/31/2024    TP 6.9 08/31/2024    ALB 4.2 08/31/2024    GLOBULIN 2.7 08/31/2024     08/31/2024    K 4.0 08/31/2024     08/31/2024    CO2 23.0 08/31/2024      Lab Results   Component Value Date    CHOLEST 131 08/31/2024    TRIG 123 08/31/2024    HDL 33 (L) 08/31/2024    LDL 76 08/31/2024    VLDL 19 08/31/2024    NONHDLC 98 08/31/2024      Lab Results   Component Value Date    TSH 0.987 08/31/2024      Lab Results   Component Value Date     08/09/2022    A1C 5.5 08/09/2022       ASSESSMENT AND PLAN:   1. Acute recurrent pansinusitis  - start abt and continue supportive OTC meds  - doxycycline 100 MG Oral Cap; Take 1 capsule (100 mg total) by mouth 2 (two) times daily for 7 days.  Dispense: 14 capsule; Refill: 0      The patient indicates understanding of these issues and agrees to the plan.  The patient is asked to return as needed or when routine is due.         Please note that the following visit was completed using two-way, real-time interactive audio and/or video communication.  This has been done in [place] to provide continuity of care in the best interest of the provider-patient relationship, due to the ongoing public health crisis/national emergency and because of restrictions of visitation.  There are limitations of this visit as no physical exam could be performed.  Every conscious effort was taken to allow for sufficient and adequate time.  This billing was spent on reviewing labs, medications, radiology tests and decision making.  Appropriate medical decision-making and tests are ordered as detailed in the plan of care above.

## 2025-02-25 NOTE — TELEPHONE ENCOUNTER
Per chart review- refill was sent as requested.  Last refill date:   Medication Quantity Refills Start End   valACYclovir 500 MG Oral Tab 90 tablet 3 2/21/2025 --   Sig:   Take 1 tablet (500 mg total) by mouth daily.

## 2025-04-01 ENCOUNTER — OFFICE VISIT (OUTPATIENT)
Dept: INTERNAL MEDICINE CLINIC | Facility: CLINIC | Age: 30
End: 2025-04-01
Payer: COMMERCIAL

## 2025-04-01 VITALS
BODY MASS INDEX: 33.42 KG/M2 | HEART RATE: 88 BPM | RESPIRATION RATE: 16 BRPM | WEIGHT: 225.63 LBS | TEMPERATURE: 97 F | DIASTOLIC BLOOD PRESSURE: 68 MMHG | SYSTOLIC BLOOD PRESSURE: 122 MMHG | HEIGHT: 69 IN | OXYGEN SATURATION: 99 %

## 2025-04-01 DIAGNOSIS — U07.1 COVID-19: ICD-10-CM

## 2025-04-01 DIAGNOSIS — B34.9 VIRAL SYNDROME: Primary | ICD-10-CM

## 2025-04-01 LAB
COVID19 BINAX NOW ANTIGEN: DETECTED
OPERATOR ID: ABNORMAL
POCT LOT NUMBER: ABNORMAL

## 2025-04-01 PROCEDURE — 99213 OFFICE O/P EST LOW 20 MIN: CPT | Performed by: NURSE PRACTITIONER

## 2025-04-01 PROCEDURE — 3074F SYST BP LT 130 MM HG: CPT | Performed by: NURSE PRACTITIONER

## 2025-04-01 PROCEDURE — 3008F BODY MASS INDEX DOCD: CPT | Performed by: NURSE PRACTITIONER

## 2025-04-01 PROCEDURE — G2211 COMPLEX E/M VISIT ADD ON: HCPCS | Performed by: NURSE PRACTITIONER

## 2025-04-01 PROCEDURE — 3078F DIAST BP <80 MM HG: CPT | Performed by: NURSE PRACTITIONER

## 2025-04-01 NOTE — PROGRESS NOTES
Elizabeth Eli is a 30 year old female.  CHIEF COMPLAINT   URI symptoms    HPI:   Weds started feeling ill. Thursday with cough- productive at times, congestion, lost voice, aches.    No sob, GI symptoms, loss of smell or taste, rashes. Fever, chills, fatigue.         Current Outpatient Medications   Medication Sig Dispense Refill    valACYclovir 500 MG Oral Tab Take 1 tablet (500 mg total) by mouth daily. 90 tablet 3    Tirzepatide-Weight Management (ZEPBOUND) 15 MG/0.5ML Subcutaneous Solution Auto-injector Inject 15 mg into the skin once a week. 6 mL 1    fluticasone propionate 50 MCG/ACT Nasal Suspension 2 sprays by Each Nare route daily. 48 g 0    Rizatriptan Benzoate 10 MG Oral Tab use at onset; may repeat once after 2 hours- ONLY 2 IN 24 HOUR PERIOD MAX.  This is a 30 day supply. 12 tablet 11    ibuprofen (MOTRIN IB) 200 MG Oral Tab Take 4 tablets (800 mg total) by mouth as needed for Pain.      Levonorgestrel (MIRENA, 52 MG, IU) by Intrauterine route.      acetaminophen 500 MG Oral Tab Take 1 tablet (500 mg total) by mouth every 6 (six) hours as needed for Pain.      cetirizine 10 MG Oral Tab Take 1 tablet (10 mg total) by mouth daily as needed.        Past Medical History:    Abnormal uterine bleeding    spotting and bleeding X 2    Allergic rhinitis    Anxiety state, unspecified    Arthritis    Asthma (HCC)    Bacterial vaginosis    Chronic back pain    Depression    Hx in high school; no medications    Gestational hypertension, third trimester (HCC)    Group B streptococcal infection    Herpes    Takes daily Acyclovir; last outbreak 3 weeks ago; no active lesions, plan for vaginal delivery    Herpes    taking acyclovir BID , states last outbreak 1 year ago     Miscarriage (HCC)    Pap smear for cervical cancer screening    wnl    Pneumonia due to organism    Pregnancy-induced hypertension (HCC)    Hx PIH w/ 1st two pregnancies, IOL @ 39.4 and 36.6 wks    S/P tonsillectomy     (spontaneous vaginal  delivery) ([facility])      Social History:  Social History     Socioeconomic History    Marital status:    Occupational History    Occupation: pharmacy tech   Tobacco Use    Smoking status: Former     Current packs/day: 0.50     Average packs/day: 0.5 packs/day for 3.0 years (1.5 ttl pk-yrs)     Types: Cigarettes     Passive exposure: Never    Smokeless tobacco: Never    Tobacco comments:     quit in 2014   Vaping Use    Vaping status: Never Used   Substance and Sexual Activity    Alcohol use: Not Currently    Drug use: No    Sexual activity: Yes     Partners: Male     Birth control/protection: Mirena, I.U.D.   Other Topics Concern    Caffeine Concern Yes    Exercise Yes    Seat Belt Yes        REVIEW OF SYSTEMS:   See HPI     EXAM:     /68 (BP Location: Left arm, Patient Position: Sitting, Cuff Size: large)   Pulse 88   Temp 97.3 °F (36.3 °C) (Temporal)   Resp 16   Ht 5' 9\" (1.753 m)   Wt 225 lb 9.6 oz (102.3 kg)   LMP 11/28/2024 (Approximate)   SpO2 99%   BMI 33.32 kg/m²   Body mass index is 33.32 kg/m².   GENERAL: well developed, well nourished,in no apparent distress  HEENT: atraumatic, normocephalic,ears are clear, no sinus pain  EYES: conjunctiva are clear  NECK: supple,no adenopathy    LUNGS: clear to auscultation; no rhonchi, rales, or wheezing  CARDIO: RRR without murmur  GI: no masses, organomegaly or tenderness  MUSCULOSKELETAL: No obvious joint deformity or swelling. Normal gait.  EXTREMITIES: no edema  NEURO: Oriented times three       LABS:      Lab Results   Component Value Date    WBC 8.2 08/31/2024    RBC 4.24 08/31/2024    HGB 13.0 08/31/2024    HCT 38.0 08/31/2024    MCV 89.6 08/31/2024    MCH 30.7 08/31/2024    MCHC 34.2 08/31/2024    RDW 12.7 08/31/2024    .0 08/31/2024    MPV 10.4 10/22/2012      Lab Results   Component Value Date    GLU 82 08/31/2024    BUN 12 08/31/2024    BUNCREA 13.2 08/31/2024    CREATSERUM 0.91 08/31/2024    ANIONGAP 8 08/31/2024      10/09/2016    GFRNAA 120 05/25/2022    GFRAA 138 05/25/2022    CA 9.2 08/31/2024    OSMOCALC 293 08/31/2024    ALKPHO 59 08/31/2024    AST 13 08/31/2024    ALT <7 (L) 08/31/2024    BILT 0.4 08/31/2024    TP 6.9 08/31/2024    ALB 4.2 08/31/2024    GLOBULIN 2.7 08/31/2024     08/31/2024    K 4.0 08/31/2024     08/31/2024    CO2 23.0 08/31/2024      Lab Results   Component Value Date    CHOLEST 131 08/31/2024    TRIG 123 08/31/2024    HDL 33 (L) 08/31/2024    LDL 76 08/31/2024    VLDL 19 08/31/2024    NONHDLC 98 08/31/2024      Lab Results   Component Value Date    TSH 0.987 08/31/2024      Lab Results   Component Value Date     08/09/2022    A1C 5.5 08/09/2022       ASSESSMENT AND PLAN:   1. Viral syndrome  2. COVID-19  - pt has a viral syndrome. Tested for COVID and is positive. Is day 6 and too late for paxlovid. No sob.   - start supportive care and monitor.   - isolate per cdc guidelines  - to eR as needed   - COVID19 BinaxNOW Antigen          The patient indicates understanding of these issues and agrees to the plan.  The patient is asked to return as needed or when routine care is due.

## 2025-04-02 NOTE — PATIENT INSTRUCTIONS
Isolate per CDC guidelines     Keep well hydrated      You can use robitussin DM or mucinex DM as directed on the bottle for cough and chest congestion.      Use cepacol for sore throat and dry cough as needed.      Use tylenol as needed for pain or fever    Go to the ER for any emergent symptoms. If you cannot get there safely call 911.      Call the office if your sinus symptoms last > 7 days     Follow up as needed or when routine care is due  Viral Upper Respiratory Illness (Adult)    You have a viral upper respiratory illness (URI), which is another term for the common cold. This illness is contagious during the first few days. It is spread through the air by coughing and sneezing. It may also be spread by direct contact (touching the sick person and then touching your own eyes, nose, or mouth). Frequent handwashing will decrease risk of spread. Most viral illnesses go away within 7 to 10 days with rest and simple home remedies. Sometimes the illness may last for several weeks. Antibiotics will not kill a virus, and they are generally not prescribed for this condition.  Home care  If symptoms are severe, rest at home for the first 2 to 3 days. When you resume activity, don't let yourself get too tired.  Don't smoke. If you need help stopping, talk with your healthcare provider.  Avoid being exposed to cigarette smoke (yours or others’).  You may use acetaminophen or ibuprofen to control pain and fever, unless another medicine was prescribed. If you have chronic liver or kidney disease, have ever had a stomach ulcer or gastrointestinal bleeding, or are taking blood-thinning medicines, talk with your healthcare provider before using these medicines. Aspirin should never be given to anyone under 18 years of age who is ill with a viral infection or fever. It may cause severe liver or brain damage.  Your appetite may be poor, so a light diet is fine. Stay well hydrated by drinking 6 to 8 glasses of fluids per day  (water, soft drinks, juices, tea, or soup). Extra fluids will help loosen secretions in the nose and lungs.  Over-the-counter cold medicines will not shorten the length of time you’re sick, but they may be helpful for the following symptoms: cough, sore throat, and nasal and sinus congestion. If you take prescription medicines, ask your healthcare provider or pharmacist which over-the-counter medicines are safe to use. (Note: Don't use decongestants if you have high blood pressure.)  Follow-up care  Follow up with your healthcare provider, or as advised.  When to seek medical advice  Call your healthcare provider right away if any of these occur:  Cough with lots of colored sputum (mucus)  Severe headache; face, neck, or ear pain  Difficulty swallowing due to throat pain  Fever of 100.4°F (38°C) or higher, or as directed by your healthcare provider  Call 911  Call 911 if any of these occur:  Chest pain, shortness of breath, wheezing, or difficulty breathing  Coughing up blood  Very severe pain with swallowing, especially if it goes along with a muffled voice   Date Last Reviewed: 6/1/2018  © 3399-4076 [employer]. [address] [place] [address], [place] [address]. All rights reserved. This information is not intended as a substitute for professional medical care. Always follow your healthcare professional's instructions.

## 2025-04-11 ENCOUNTER — TELEMEDICINE (OUTPATIENT)
Dept: INTERNAL MEDICINE CLINIC | Facility: CLINIC | Age: 30
End: 2025-04-11
Payer: COMMERCIAL

## 2025-04-11 DIAGNOSIS — J01.00 ACUTE NON-RECURRENT MAXILLARY SINUSITIS: Primary | ICD-10-CM

## 2025-04-11 RX ORDER — DOXYCYCLINE 100 MG/1
100 CAPSULE ORAL 2 TIMES DAILY
Qty: 14 CAPSULE | Refills: 0 | Status: SHIPPED | OUTPATIENT
Start: 2025-04-11 | End: 2025-04-18

## 2025-04-11 NOTE — PROGRESS NOTES
Virtual video Check-In    Elizabeth Eli verbally consents to a Virtual/video Check-In visit on 04/11/25.  Patient has been referred to the [facility] website at [email or web address] to review the yearly Consent to Treat document.    Patient understands and accepts financial responsibility for any deductible, co-insurance and/or co-pays associated with this service.    Duration of the service: 10 minutes    Elizabeth Eli is a 30 year old female.  CHIEF COMPLAINT   Congestion    HPI:   Had COVID. Did okay but since then is having a productive cough, sinus congestion, ear congestion. No sob, fever or chills.       Current Medications[1]   Past Medical History[2]   Social History:  Short Social Hx on File[3]     REVIEW OF SYSTEMS:   See HPI    EXAM:   Limited due to video visit  GENERAL: does not sound like they are in any acute distress on the video  HEENT: sinus pain with palpation   LUNGS: They are able to phonate clearly without pausing due to sob, there was no coughing during the visit.  NEURO: Oriented times three      LABS:      Lab Results   Component Value Date    WBC 8.2 08/31/2024    RBC 4.24 08/31/2024    HGB 13.0 08/31/2024    HCT 38.0 08/31/2024    MCV 89.6 08/31/2024    MCH 30.7 08/31/2024    MCHC 34.2 08/31/2024    RDW 12.7 08/31/2024    .0 08/31/2024    MPV 10.4 10/22/2012      Lab Results   Component Value Date    GLU 82 08/31/2024    BUN 12 08/31/2024    BUNCREA 13.2 08/31/2024    CREATSERUM 0.91 08/31/2024    ANIONGAP 8 08/31/2024     10/09/2016    GFRNAA 120 05/25/2022    GFRAA 138 05/25/2022    CA 9.2 08/31/2024    OSMOCALC 293 08/31/2024    ALKPHO 59 08/31/2024    AST 13 08/31/2024    ALT <7 (L) 08/31/2024    BILT 0.4 08/31/2024    TP 6.9 08/31/2024    ALB 4.2 08/31/2024    GLOBULIN 2.7 08/31/2024     08/31/2024    K 4.0 08/31/2024     08/31/2024    CO2 23.0 08/31/2024      Lab Results   Component Value Date    CHOLEST 131 08/31/2024    TRIG 123 08/31/2024    HDL 33  (L) 08/31/2024    LDL 76 08/31/2024    VLDL 19 08/31/2024    NONHDLC 98 08/31/2024      Lab Results   Component Value Date    TSH 0.987 08/31/2024      Lab Results   Component Value Date     08/09/2022    A1C 5.5 08/09/2022        IMAGING:     No results found.     ASSESSMENT AND PLAN:   1. Acute non-recurrent maxillary sinusitis  - has sinus congestion, chest congestion, ear congestion after COVD recently. Has been with symptoms for 2 weeks and is worsening.   - start abt  - continue flonse and OTC supportive care   - doxycycline 100 MG Oral Cap; Take 1 capsule (100 mg total) by mouth 2 (two) times daily for 7 days.  Dispense: 14 capsule; Refill: 0      The patient indicates understanding of these issues and agrees to the plan.  The patient is asked to return as needed or when routine care is due.         Please note that the following visit was completed using two-way, real-time interactive audio and/or video communication.  This has been done in good [place] to provide continuity of care in the best interest of the provider-patient relationship, due to the ongoing public health crisis/national emergency and because of restrictions of visitation.  There are limitations of this visit as no physical exam could be performed.  Every conscious effort was taken to allow for sufficient and adequate time.  This billing was spent on reviewing labs, medications, radiology tests and decision making.  Appropriate medical decision-making and tests are ordered as detailed in the plan of care above.        [1]   Current Outpatient Medications   Medication Sig Dispense Refill    valACYclovir 500 MG Oral Tab Take 1 tablet (500 mg total) by mouth daily. 90 tablet 3    Tirzepatide-Weight Management (ZEPBOUND) 15 MG/0.5ML Subcutaneous Solution Auto-injector Inject 15 mg into the skin once a week. 6 mL 1    fluticasone propionate 50 MCG/ACT Nasal Suspension 2 sprays by Each Nare route daily. 48 g 0    Rizatriptan Benzoate 10 MG  Oral Tab use at onset; may repeat once after 2 hours- ONLY 2 IN 24 HOUR PERIOD MAX.  This is a 30 day supply. 12 tablet 11    ibuprofen (MOTRIN IB) 200 MG Oral Tab Take 4 tablets (800 mg total) by mouth as needed for Pain.      Levonorgestrel (MIRENA, 52 MG, IU) by Intrauterine route.      acetaminophen 500 MG Oral Tab Take 1 tablet (500 mg total) by mouth every 6 (six) hours as needed for Pain.      cetirizine 10 MG Oral Tab Take 1 tablet (10 mg total) by mouth daily as needed.     [2]   Past Medical History:   Abnormal uterine bleeding    spotting and bleeding X 2    Allergic rhinitis    Anxiety state, unspecified    Arthritis    Asthma ([facility])    Bacterial vaginosis    Chronic back pain    Depression    Hx in high school; no medications    Gestational hypertension, third trimester ([facility])    Group B streptococcal infection    Herpes    Takes daily Acyclovir; last outbreak 3 weeks ago; no active lesions, plan for vaginal delivery    Herpes    taking acyclovir BID , states last outbreak 1 year ago     Miscarriage ([facility])    Pap smear for cervical cancer screening    wnl    Pneumonia due to organism    Pregnancy-induced hypertension ([facility])    Hx PIH w/ 1st two pregnancies, IOL @ 39.4 and 36.6 wks    S/P tonsillectomy     (spontaneous vaginal delivery) ([facility])   [3]   Social History  Socioeconomic History    Marital status:    Occupational History    Occupation: pharmacy tech   Tobacco Use    Smoking status: Former     Current packs/day: 0.50     Average packs/day: 0.5 packs/day for 3.0 years (1.5 ttl pk-yrs)     Types: Cigarettes     Passive exposure: Never    Smokeless tobacco: Never    Tobacco comments:     quit in    Vaping Use    Vaping status: Never Used   Substance and Sexual Activity    Alcohol use: Not Currently    Drug use: No    Sexual activity: Yes     Partners: Male     Birth control/protection: Mirena, I.U.D.   Other Topics Concern    Caffeine Concern Yes    Exercise Yes    Seat Belt Yes

## 2025-04-11 NOTE — PATIENT INSTRUCTIONS
Flonase     Keep well hydrated     You can use robitussin DM or mucinex DM as directed on the bottle for cough and chest congestion.      Use cepacol for sore throat and dry cough as needed.      Use tylenol as needed for pain or fever    Take antibiotic completely as ordered     Take antibiotic with food    Eat yogurt twice a day while on antibiotic or take an oral probiotic    Monitor for diarrhea, side effects, allergic reaction    If you have a mild allergic reaction take benadryl and call the office. If it is severe and you have lip or throat swelling or trouble breathing go to the ER or call 911     Follow up as needed or when routine care is due  Viral Upper Respiratory Illness (Adult)    You have a viral upper respiratory illness (URI), which is another term for the common cold. This illness is contagious during the first few days. It is spread through the air by coughing and sneezing. It may also be spread by direct contact (touching the sick person and then touching your own eyes, nose, or mouth). Frequent handwashing will decrease risk of spread. Most viral illnesses go away within 7 to 10 days with rest and simple home remedies. Sometimes the illness may last for several weeks. Antibiotics will not kill a virus, and they are generally not prescribed for this condition.  Home care  If symptoms are severe, rest at home for the first 2 to 3 days. When you resume activity, don't let yourself get too tired.  Don't smoke. If you need help stopping, talk with your healthcare provider.  Avoid being exposed to cigarette smoke (yours or others’).  You may use acetaminophen or ibuprofen to control pain and fever, unless another medicine was prescribed. If you have chronic liver or kidney disease, have ever had a stomach ulcer or gastrointestinal bleeding, or are taking blood-thinning medicines, talk with your healthcare provider before using these medicines. Aspirin should never be given to anyone under 18 years of  age who is ill with a viral infection or fever. It may cause severe liver or brain damage.  Your appetite may be poor, so a light diet is fine. Stay well hydrated by drinking 6 to 8 glasses of fluids per day (water, soft drinks, juices, tea, or soup). Extra fluids will help loosen secretions in the nose and lungs.  Over-the-counter cold medicines will not shorten the length of time you’re sick, but they may be helpful for the following symptoms: cough, sore throat, and nasal and sinus congestion. If you take prescription medicines, ask your healthcare provider or pharmacist which over-the-counter medicines are safe to use. (Note: Don't use decongestants if you have high blood pressure.)  Follow-up care  Follow up with your healthcare provider, or as advised.  When to seek medical advice  Call your healthcare provider right away if any of these occur:  Cough with lots of colored sputum (mucus)  Severe headache; face, neck, or ear pain  Difficulty swallowing due to throat pain  Fever of 100.4°F (38°C) or higher, or as directed by your healthcare provider  Call 911  Call 911 if any of these occur:  Chest pain, shortness of breath, wheezing, or difficulty breathing  Coughing up blood  Very severe pain with swallowing, especially if it goes along with a muffled voice   Date Last Reviewed: 6/1/2018  © 8862-4478 [employer]. [address] [place] [address], [place] [address]. All rights reserved. This information is not intended as a substitute for professional medical care. Always follow your healthcare professional's instructions.

## 2025-04-21 DIAGNOSIS — G43.B0 OPHTHALMOPLEGIC MIGRAINE, NOT INTRACTABLE: ICD-10-CM

## 2025-04-22 NOTE — TELEPHONE ENCOUNTER
Last OV relevant to medication: 9/7/24  Last refill date: 12/18/23 #15/refills: 0  When pt was asked to return for OV: 9/7/25  Upcoming appt/reason:   Future Appointments   Date Time Provider Department Center   5/12/2025  5:00 PM Nimco Mark PA-C [facility] [address]   7/11/2025  8:00 AM Nelia Jean MD [facility] [place]   8/11/2025  5:00 PM Nimco Mark PA-C [facility] [address]   11/10/2025  5:00 PM Nimco Mark PA-C [facility] [address]   2/16/2026  5:00 PM Nimco Mark PA-C [facility] [address]   Was pt informed of any over due labs: due in sept 2. Ophthalmoplegic migraine, not intractable  Has tried zonisamide, topamax and rizatriptan in the past. Documented relief per neurology notes. Currently taking Ibuprofen.   - Neuro Referral - In Network  - ondansetron (ZOFRAN) 4 mg tablet; Take 1 tablet (4 mg total) by mouth every 8 (eight) hours as needed for Nausea.  Dispense: 15 tablet; Refill: 0      Message sent to pt.

## 2025-05-01 ENCOUNTER — OFFICE VISIT (OUTPATIENT)
Facility: CLINIC | Age: 30
End: 2025-05-01
Payer: COMMERCIAL

## 2025-05-01 VITALS
DIASTOLIC BLOOD PRESSURE: 68 MMHG | HEART RATE: 76 BPM | WEIGHT: 223 LBS | HEIGHT: 69 IN | SYSTOLIC BLOOD PRESSURE: 110 MMHG | BODY MASS INDEX: 33.03 KG/M2 | RESPIRATION RATE: 16 BRPM | OXYGEN SATURATION: 99 %

## 2025-05-01 DIAGNOSIS — E66.811 CLASS 1 OBESITY WITH BODY MASS INDEX (BMI) OF 32.0 TO 32.9 IN ADULT, UNSPECIFIED OBESITY TYPE, UNSPECIFIED WHETHER SERIOUS COMORBIDITY PRESENT: ICD-10-CM

## 2025-05-01 DIAGNOSIS — Z51.81 ENCOUNTER FOR THERAPEUTIC DRUG MONITORING: Primary | ICD-10-CM

## 2025-05-01 DIAGNOSIS — E55.9 VITAMIN D DEFICIENCY: ICD-10-CM

## 2025-05-01 DIAGNOSIS — E88.819 INSULIN RESISTANCE: ICD-10-CM

## 2025-05-01 DIAGNOSIS — E78.5 HYPERLIPIDEMIA, UNSPECIFIED HYPERLIPIDEMIA TYPE: ICD-10-CM

## 2025-05-01 PROCEDURE — 3008F BODY MASS INDEX DOCD: CPT | Performed by: PHYSICIAN ASSISTANT

## 2025-05-01 PROCEDURE — 3078F DIAST BP <80 MM HG: CPT | Performed by: PHYSICIAN ASSISTANT

## 2025-05-01 PROCEDURE — 99214 OFFICE O/P EST MOD 30 MIN: CPT | Performed by: PHYSICIAN ASSISTANT

## 2025-05-01 PROCEDURE — 3074F SYST BP LT 130 MM HG: CPT | Performed by: PHYSICIAN ASSISTANT

## 2025-05-01 RX ORDER — TIRZEPATIDE 15 MG/.5ML
15 INJECTION, SOLUTION SUBCUTANEOUS WEEKLY
Qty: 6 ML | Refills: 1 | Status: SHIPPED | OUTPATIENT
Start: 2025-05-01

## 2025-05-01 NOTE — PROGRESS NOTES
HISTORY OF PRESENT ILLNESS  Chief Complaint   Patient presents with    Weight Check     -10lbs (02/17/25 233lb)       Elizabeth Eli is a 30 year old female here for follow up in medical weight loss program.   -10lbs  Compliant on zepbound  Denies chest pain, shortness of breath, dizziness, blurred vision, headache, paresthesia, nausea/vomiting.   Family got hit with every illness in the house, strep, RSV, norovirus, COVID  Daughter broke her leg  Listening to audio books, crocheted a blanket, trying to get back to her hobbies  Struggles with red meat  Prioritizing protein  Less eating out  No drive through May, and work no soda May    Exercise/Activity: hasn't been pushing as hard as she once was with being in survival mode, now that it is calming down and is going to push herself harder  Nutrition: 24 hour food log reviewed, eating regular meals, +protein  Stress: 6/10  Sleep: 4 hours/night    C Follow Up    General Information  Success Moment: Ran a mile on the treadmill took me 18mins but i did it  Challenging Moment: Eating breakfast and water intake  Nutrition Recall  Breakfast: Protien coffee x shake Lunch: Broccli ans chedder soup cup   Dinner: Taco bowl, steak, rice, tomatosxand avocado    Fluids: Water 4 cups, coffee 2 cups Dining Out: 1   Exercise   Patient stated exercises # days/week: 4  Patient stated perceived level of   exertion: 2 Anaerobic Days: 2   Aerobic Days: 2   Patient stated average level of stress: 6  Sleep   Patient stated # hours uninterrupted sleep: 4   Patient stated feels   restful: No      Goals: To make a new goal that achievable              Wt Readings from Last 6 Encounters:   05/01/25 223 lb (101.2 kg)   04/01/25 225 lb 9.6 oz (102.3 kg)   02/18/25 233 lb (105.7 kg)   02/17/25 233 lb (105.7 kg)   01/09/25 238 lb (108 kg)   12/07/24 242 lb 6.4 oz (110 kg)            Breakfast Lunch Dinner Snacks Fluids   Reviewed           REVIEW OF SYSTEMS  GENERAL HEALTH: feels well  otherwise, denied any fevers chills or night sweats   RESPIRATORY: denies shortness of breath   CARDIOVASCULAR: denies chest pain  GI: denies abdominal pain    EXAM  /68   Pulse 76   Resp 16   Ht 5' 9\" (1.753 m)   Wt 223 lb (101.2 kg)   LMP 11/28/2024 (Approximate)   SpO2 99%   BMI 32.93 kg/m²   GENERAL: well developed, well nourished,in no apparent distress, A/O x3  SKIN: no rashes,no suspicious lesions  HEENT: atraumatic, normocephalic, OP-clear, PERRL  NECK: supple,no adenopathy  LUNGS: clear to auscultation bilaterally   CARDIO: RRR without murmur  GI: good BS's,NT/ND, no masses or HSM  EXTREMITIES: no cyanosis, no clubbing, no edema    Lab Results   Component Value Date    WBC 8.2 08/31/2024    RBC 4.24 08/31/2024    HGB 13.0 08/31/2024    HCT 38.0 08/31/2024    MCV 89.6 08/31/2024    MCH 30.7 08/31/2024    MCHC 34.2 08/31/2024    RDW 12.7 08/31/2024    .0 08/31/2024    MPV 10.4 10/22/2012     Lab Results   Component Value Date    GLU 82 08/31/2024    BUN 12 08/31/2024    BUNCREA 13.2 08/31/2024    CREATSERUM 0.91 08/31/2024    ANIONGAP 8 08/31/2024     10/09/2016    GFRNAA 120 05/25/2022    GFRAA 138 05/25/2022    CA 9.2 08/31/2024    OSMOCALC 293 08/31/2024    ALKPHO 59 08/31/2024    AST 13 08/31/2024    ALT <7 (L) 08/31/2024    BILT 0.4 08/31/2024    TP 6.9 08/31/2024    ALB 4.2 08/31/2024    GLOBULIN 2.7 08/31/2024     08/31/2024    K 4.0 08/31/2024     08/31/2024    CO2 23.0 08/31/2024     Lab Results   Component Value Date     08/09/2022    A1C 5.5 08/09/2022     Lab Results   Component Value Date    CHOLEST 131 08/31/2024    TRIG 123 08/31/2024    HDL 33 (L) 08/31/2024    LDL 76 08/31/2024    VLDL 19 08/31/2024    NONHDLC 98 08/31/2024     Lab Results   Component Value Date    TSH 0.987 08/31/2024     Lab Results   Component Value Date    B12 461 10/04/2022     Lab Results   Component Value Date    VITD 23.0 (L) 10/04/2022       Medications Ordered Prior to  Encounter[1]    ASSESSMENT  Analyzed weight data:       Diagnoses and all orders for this visit:    Encounter for therapeutic drug monitoring  -     Tirzepatide-Weight Management (ZEPBOUND) 15 MG/0.5ML Subcutaneous Solution Auto-injector; Inject 15 mg into the skin once a week.    Class 1 obesity with body mass index (BMI) of 32.0 to 32.9 in adult, unspecified obesity type, unspecified whether serious comorbidity present  -     Tirzepatide-Weight Management (ZEPBOUND) 15 MG/0.5ML Subcutaneous Solution Auto-injector; Inject 15 mg into the skin once a week.    Hyperlipidemia, unspecified hyperlipidemia type  -     Tirzepatide-Weight Management (ZEPBOUND) 15 MG/0.5ML Subcutaneous Solution Auto-injector; Inject 15 mg into the skin once a week.    Insulin resistance  -     Tirzepatide-Weight Management (ZEPBOUND) 15 MG/0.5ML Subcutaneous Solution Auto-injector; Inject 15 mg into the skin once a week.    Vitamin D deficiency  -     Tirzepatide-Weight Management (ZEPBOUND) 15 MG/0.5ML Subcutaneous Solution Auto-injector; Inject 15 mg into the skin once a week.        PLAN  Initial Weight: 328lbs  Initial Weight Date: 6/16/23  Today's Weight: 223lbs  5% Goal: 16.4lbs  10% Goal: 32.8lbs   Total Weight Loss: -10lbs/Net loss 105lb    Will continue zepbound - advised side effects and adverse effects of medication   HLD - lifestyle changes  Insulin resistance - continue current medications, low carb diet  Vit D supplement, take with food  Consistency with logging foods - protein and produce  Incorporate more strength/resistance training  Nutrition: low carb diet/ recommended to eat breakfast daily/ regular protein intake  Medication use and side effects reviewed with patient.  Medication contraindications: phentermine, topamax  Follow up with dietitian and psychologist as recommended.  Discussed the role of sleep and stress in weight management.  Counseled on comprehensive weight loss plan including attention to nutrition,  exercise and behavior/stress management for success. See patient instruction below for more details.  Discussed strategies to overcome barriers to successful weight loss and weight maintenance  FITTE: ACSM recommendations (150-300 minutes/ week in active weight loss)   Weight Loss consent to treat reviewed and signed       NOTE TO PATIENT: The 21st Century Cures Act makes clinical notes like these available to patients in the interest of transparency. Clinical notes are medical documents used by physicians and care providers to communicate with each other. These documents include medical language and terminology, abbreviations, and treatment information that may sound technical and at times possibly unfamiliar. In addition, at times, the verbiage may appear blunt or direct. These documents are one tool providers use to communicate relevant information and clinical opinions of the care providers in a way that allows common understanding of the clinical context.     There are no Patient Instructions on file for this visit.    No follow-ups on file.    Patient verbalizes understanding.    Nimco Mark PA-C  5/1/2025           [1]   Current Outpatient Medications on File Prior to Visit   Medication Sig Dispense Refill    valACYclovir 500 MG Oral Tab Take 1 tablet (500 mg total) by mouth daily. 90 tablet 3    fluticasone propionate 50 MCG/ACT Nasal Suspension 2 sprays by Each Nare route daily. 48 g 0    Rizatriptan Benzoate 10 MG Oral Tab use at onset; may repeat once after 2 hours- ONLY 2 IN 24 HOUR PERIOD MAX.  This is a 30 day supply. 12 tablet 11    ibuprofen (MOTRIN IB) 200 MG Oral Tab Take 4 tablets (800 mg total) by mouth as needed for Pain.      Levonorgestrel (MIRENA, 52 MG, IU) by Intrauterine route.      acetaminophen 500 MG Oral Tab Take 1 tablet (500 mg total) by mouth every 6 (six) hours as needed for Pain.      cetirizine 10 MG Oral Tab Take 1 tablet (10 mg total) by mouth daily as needed.       No  current facility-administered medications on file prior to visit.

## 2025-05-13 ENCOUNTER — HOSPITAL ENCOUNTER (OUTPATIENT)
Dept: MAMMOGRAPHY | Facility: HOSPITAL | Age: 30
Discharge: HOME | End: 2025-05-13
Attending: OBSTETRICS & GYNECOLOGY
Payer: COMMERCIAL

## 2025-05-13 DIAGNOSIS — Z12.31 ENCOUNTER FOR SCREENING MAMMOGRAM FOR MALIGNANT NEOPLASM OF BREAST: ICD-10-CM

## 2025-05-13 PROCEDURE — 77067 SCR MAMMO BI INCL CAD: CPT | Performed by: OBSTETRICS & GYNECOLOGY

## 2025-05-13 PROCEDURE — 77063 BREAST TOMOSYNTHESIS BI: CPT | Performed by: OBSTETRICS & GYNECOLOGY

## 2025-07-04 RX ORDER — ONDANSETRON 4 MG/1
4 TABLET, FILM COATED ORAL EVERY 8 HOURS PRN
Qty: 15 TABLET | Refills: 0 | OUTPATIENT
Start: 2025-07-04

## 2025-07-11 ENCOUNTER — OFFICE VISIT (OUTPATIENT)
Dept: OBGYN CLINIC | Facility: CLINIC | Age: 30
End: 2025-07-11
Payer: COMMERCIAL

## 2025-07-11 VITALS
HEART RATE: 98 BPM | HEIGHT: 69 IN | DIASTOLIC BLOOD PRESSURE: 72 MMHG | SYSTOLIC BLOOD PRESSURE: 114 MMHG | BODY MASS INDEX: 32.16 KG/M2 | WEIGHT: 217.13 LBS

## 2025-07-11 DIAGNOSIS — T83.32XD INTRAUTERINE CONTRACEPTIVE DEVICE THREADS LOST, SUBSEQUENT ENCOUNTER: ICD-10-CM

## 2025-07-11 DIAGNOSIS — Z01.419 ENCOUNTER FOR WELL WOMAN EXAM WITH ROUTINE GYNECOLOGICAL EXAM: Primary | ICD-10-CM

## 2025-07-11 PROCEDURE — 99459 PELVIC EXAMINATION: CPT | Performed by: OBSTETRICS & GYNECOLOGY

## 2025-07-11 PROCEDURE — 3078F DIAST BP <80 MM HG: CPT | Performed by: OBSTETRICS & GYNECOLOGY

## 2025-07-11 PROCEDURE — 3074F SYST BP LT 130 MM HG: CPT | Performed by: OBSTETRICS & GYNECOLOGY

## 2025-07-11 PROCEDURE — 3008F BODY MASS INDEX DOCD: CPT | Performed by: OBSTETRICS & GYNECOLOGY

## 2025-07-11 PROCEDURE — 99395 PREV VISIT EST AGE 18-39: CPT | Performed by: OBSTETRICS & GYNECOLOGY

## 2025-07-11 NOTE — PROGRESS NOTES
[facility]  Obstetrics and Gynecology    Subjective:     Elziabeth Eli is a 30 year old  who presents for an annual gyn exam. Patient complaints include some stresses in the last year that have left her with more social anxiety, unsteadiness in her relationship with her , and feeling lost in herself. Has been exercising and losing weight - almost at her goal!    Patient's last menstrual period was 2025 (approximate).   Menarche: 9 (2025  7:59 AM)  Period Cycle (Days): irregular (2025  7:59 AM)  Period Duration (Days): 4 days (2025  7:59 AM)  Period Flow: light-moderate (2025  7:59 AM)  Use of Birth Control (if yes, specify type): Mirena IUD (2025  7:59 AM)  Hx Prior Abnormal Pap: No (2025  7:59 AM)  Pap Date: 24 (2025  7:59 AM)  Pap Result Notes: WNL (2025  7:59 AM)     Dyspareunia: No.    Difficulty with bowel or bladder function: No.   History of STDs: h/o HSV  Smoker: No.  Safe at home: Yes.  , Kids doing well - 5 between 10-2yo  Pharmacy tech at [employer]    Screening:  Pap smear: neg   Mammogram: 2025 - BI-RADS 1, done for family history  Colonoscopy: n/a -   DEXA: n/a No recommendations at this time     Review of Systems  Constitutional: Denies fever/chills, weight loss, fatigue, weakness, or sweating  HEENT: Denies headache, hearing loss or tinnitus, ear pain or discharge, nosebleeds, congestion, sore throat  Eye: Denies visual changes, eye pain or discharge or redness  Cardiovascular: Denies chest pain, palpitations  Pulmonary: Denies cough, shortness of breath, wheezing  Breast: denies breast pain or palpable mass, skin changes, nipple discharge  GI: Denies nausea, vomiting, diarrhea, constipation, heartburn, hemachezia  : Denies dysuria, urgency, frequency, hematuria, flank pain  Musculoskeletal: Denies myalgias, pain in neck or back or joints  Skin: Denies rash, itching  Endocrine: Denies easy bruising or  bleeding, increased thirst  Neuro: Denies dizziness, paraesthesias, focal weakness, seizures, loss of consciousness  Psych: Denies depression, anxiety, suicidal ideations, hallucinations, insomnia     Past Medical History   Past Medical History:    Abnormal uterine bleeding    spotting and bleeding X 2    Allergic rhinitis    Anxiety state, unspecified    Arthritis    Asthma ([facility])    Bacterial vaginosis    Chronic back pain    Depression    Hx in high school; no medications    Gestational hypertension, third trimester ([facility])    Group B streptococcal infection    Herpes    Takes daily Acyclovir; last outbreak 3 weeks ago; no active lesions, plan for vaginal delivery    Herpes    taking acyclovir BID , states last outbreak 1 year ago     Miscarriage ([facility])    Pap smear for cervical cancer screening    wnl    Pneumonia due to organism    Pregnancy-induced hypertension ([facility])    Hx PIH w/ 1st two pregnancies, IOL @ 39.4 and 36.6 wks    S/P tonsillectomy     (spontaneous vaginal delivery) ([facility])         Past Obstetric History   OB History    Para Term  AB Living   7 5 4 1 2 5   SAB IAB Ectopic Multiple Live Births   2 0 0 0 5      # Outcome Date GA Lbr Melquiades/2nd Weight Sex Type Anes PTL Lv   7 Term 22 37w4d 00:34 / 00:12 8 lb 11 oz (3.94 kg) M NORMAL SPONT EPI N EDUARDA   6 Term 20 39w0d 06:44 / 00:04 7 lb 7.9 oz (3.4 kg) F NORMAL SPONT None N EDUARDA   5 SAB 2019           4 Term 18 39w0d 06:20 / 00:18 8 lb 9 oz (3.885 kg) F NORMAL SPONT EPI N EDUARDA   3  10/09/16 36w6d 05:49 / 00:07 7 lb 11.1 oz (3.49 kg) F NORMAL SPONT EPI N EDUARDA      Complications: Group B beta strep +, PIH   2 Term 05/01/15 39w4d 05:41 / 00:22 8 lb 0.2 oz (3.635 kg) F NORMAL SPONT EPI N EDUARDA      Complications: Group B beta strep +, Meconium   1 SAB  10w0d             Birth Comments: complete       Past Surgical History   Past Surgical History:   Procedure Laterality Date    Tonsillectomy          Family History    Family History   Problem Relation Age of Onset    Breast Cancer Mother 32        EST    Stroke Mother     Cancer Mother         breast    Psychiatric Mother     Thyroid Disorder Mother     Psychiatric Father     Pancreatic Cancer Maternal Grandmother     Diabetes Maternal Grandmother     Breast Cancer Maternal Grandmother 45        EST    Cancer Maternal Grandmother         uterine and pancreatic    Neurological Disorder Maternal Grandmother         ALS    Hypertension Maternal Grandfather     Diabetes Maternal Grandfather     Other (Prothrobin gene mutation factor 2) Paternal Grandmother     Breast Cancer Maternal Aunt 40        EST        Social History   Social History     Socioeconomic History    Marital status:    Occupational History    Occupation: pharmacy tech   Tobacco Use    Smoking status: Former     Current packs/day: 0.50     Average packs/day: 0.5 packs/day for 3.0 years (1.5 ttl pk-yrs)     Types: Cigarettes     Passive exposure: Never    Smokeless tobacco: Never    Tobacco comments:     quit in 2014   Vaping Use    Vaping status: Never Used   Substance and Sexual Activity    Alcohol use: Not Currently    Drug use: No    Sexual activity: Yes     Partners: Male     Birth control/protection: Mirena, I.U.D.   Other Topics Concern    Caffeine Concern Yes    Exercise Yes    Seat Belt Yes        Medications   Current Outpatient Medications on File Prior to Visit   Medication Sig Dispense Refill    Tirzepatide-Weight Management (ZEPBOUND) 15 MG/0.5ML Subcutaneous Solution Auto-injector Inject 15 mg into the skin once a week. 6 mL 1    valACYclovir 500 MG Oral Tab Take 1 tablet (500 mg total) by mouth daily. 90 tablet 3    fluticasone propionate 50 MCG/ACT Nasal Suspension 2 sprays by Each Nare route daily. 48 g 0    Rizatriptan Benzoate 10 MG Oral Tab use at onset; may repeat once after 2 hours- ONLY 2 IN 24 HOUR PERIOD MAX.  This is a 30 day supply. 12 tablet 11    ibuprofen (MOTRIN IB) 200 MG Oral  Tab Take 4 tablets (800 mg total) by mouth as needed for Pain.      Levonorgestrel (MIRENA, 52 MG, IU) by Intrauterine route.      acetaminophen 500 MG Oral Tab Take 1 tablet (500 mg total) by mouth every 6 (six) hours as needed for Pain.      cetirizine 10 MG Oral Tab Take 1 tablet (10 mg total) by mouth daily as needed.       No current facility-administered medications on file prior to visit.       Allergies   Allergies   Allergen Reactions    Latex RASH          Objective:     Vitals:    25 0757   BP: 114/72   Pulse: 98   Weight: 217 lb 2 oz (98.5 kg)   Height: 69\"       Body mass index is 32.06 kg/m².    GEN: AAOx3, NAD, appears well, appears stated age  HEENT: normocephalic, atraumatic, thyroid symmetric without enlargement or nodularity  BREAST: bilaterally symmetric with no palpable masses, density noted, no nipple discharge, no skin changes  CV: RRR  PULM: CTAB  ABD: soft, NT, ND, no rebound or guarding  EXT: no c/c/e or tenderness  NEURO: CN 2-12 grossly intact  PELVIC:   Vulva: NEFG.   Vagina: Estrogenized, physiologic discharge.    Cervix: No lesions or tenderness.  IUD strings still not visualized or palpable.   Uterus: anteverted, 6 week size, firm, mobile, nontender.     Adnexa: No masses or tenderness.     Rectal: Anus and perineum are normal.    Chaperone offered and declined.     Assessment:     Elizabeth Eli is a 30 year old  seen for well-women gyn exam.    Plan:     -- cervical cancer screening: up to date with pap smear.   -- breast cancer screening: continue annual CBE, start annual mammograms at 39yo. Sooner by request due to family history.  -- STD screening ordered: No  -- Contraception: Mirena IUD, will confirmed in place with US last year.  -- Discussed further preventative care with PCP, staying up to date with screening and vaccinations, and maintaining healthy diet and exercise.      -- Follow up in 1 year for annual exam or sooner as needed    Nelia Ochoa MD  [facility]  OB/GYN  7/11/2025 8:08 AM

## 2025-08-11 ENCOUNTER — OFFICE VISIT (OUTPATIENT)
Facility: CLINIC | Age: 30
End: 2025-08-11

## 2025-08-11 VITALS
HEART RATE: 106 BPM | BODY MASS INDEX: 31.84 KG/M2 | DIASTOLIC BLOOD PRESSURE: 70 MMHG | HEIGHT: 69 IN | WEIGHT: 215 LBS | SYSTOLIC BLOOD PRESSURE: 118 MMHG | OXYGEN SATURATION: 98 % | RESPIRATION RATE: 18 BRPM

## 2025-08-11 DIAGNOSIS — Z51.81 ENCOUNTER FOR THERAPEUTIC DRUG MONITORING: Primary | ICD-10-CM

## 2025-08-11 DIAGNOSIS — E66.811 CLASS 1 OBESITY WITH SERIOUS COMORBIDITY AND BODY MASS INDEX (BMI) OF 31.0 TO 31.9 IN ADULT, UNSPECIFIED OBESITY TYPE: ICD-10-CM

## 2025-08-11 DIAGNOSIS — E55.9 VITAMIN D DEFICIENCY: ICD-10-CM

## 2025-08-11 DIAGNOSIS — E78.5 HYPERLIPIDEMIA, UNSPECIFIED HYPERLIPIDEMIA TYPE: ICD-10-CM

## 2025-08-11 DIAGNOSIS — E88.819 INSULIN RESISTANCE: ICD-10-CM

## 2025-08-11 PROCEDURE — 3078F DIAST BP <80 MM HG: CPT | Performed by: PHYSICIAN ASSISTANT

## 2025-08-11 PROCEDURE — 3074F SYST BP LT 130 MM HG: CPT | Performed by: PHYSICIAN ASSISTANT

## 2025-08-11 PROCEDURE — 99214 OFFICE O/P EST MOD 30 MIN: CPT | Performed by: PHYSICIAN ASSISTANT

## 2025-08-11 PROCEDURE — 3008F BODY MASS INDEX DOCD: CPT | Performed by: PHYSICIAN ASSISTANT

## 2025-08-11 RX ORDER — TIRZEPATIDE 15 MG/.5ML
15 INJECTION, SOLUTION SUBCUTANEOUS WEEKLY
Qty: 6 ML | Refills: 1 | Status: SHIPPED | OUTPATIENT
Start: 2025-08-11

## 2025-08-12 ENCOUNTER — TELEPHONE (OUTPATIENT)
Dept: INTERNAL MEDICINE CLINIC | Facility: CLINIC | Age: 30
End: 2025-08-12

## (undated) DIAGNOSIS — Z20.822 ENCOUNTER FOR PREPROCEDURE SCREENING LABORATORY TESTING FOR COVID-19: Primary | ICD-10-CM

## (undated) DIAGNOSIS — Z01.812 ENCOUNTER FOR PREPROCEDURE SCREENING LABORATORY TESTING FOR COVID-19: Primary | ICD-10-CM

## (undated) NOTE — MR AVS SNAPSHOT
After Visit Summary   6/7/2024    Elizabeth Eli   MRN: [id number]           Visit Information     Date & Time  6/7/2024  7:30 AM Provider  Nelia Jean MD [facility], [facility] - OB/GYN Dept. Phone  [phone]      Your Vitals Were  Most recent update: 6/7/2024  7:34 AM    BP   122/76    Pulse   72    Wt   284 lb 3.2 oz    LMP   05/19/2024 (Approximate)    BMI   42.58 kg/m²         Allergies as of 6/7/2024  Review status set to Review Complete on 6/7/2024       Noted Reaction Type Reactions    Latex 06/07/2013    RASH      Your Current Medications        Dosage    Tirzepatide-Weight Management (ZEPBOUND) 15 MG/0.5ML Subcutaneous Solution Auto-injector Inject into the skin.    fluticasone propionate 50 MCG/ACT Nasal Suspension 2 sprays by Each Nare route daily.    zonisamide 50 MG Oral Cap Take 1 pill at bedtime for 2 weeks, then 1 pill twice a day    Rizatriptan Benzoate 10 MG Oral Tab use at onset; may repeat once after 2 hours- ONLY 2 IN 24 HOUR PERIOD MAX.  This is a 30 day supply.    triamcinolone 0.1 % External Cream Apply topically 2 (two) times daily as needed.    Meloxicam 15 MG Oral Tab Take 1 tablet (15 mg total) by mouth daily as needed for Pain.    ibuprofen (MOTRIN IB) 200 MG Oral Tab Take 4 tablets (800 mg total) by mouth as needed for Pain.    cyclobenzaprine 5 MG Oral Tab Take 1-2 tablets (5-10 mg total) by mouth 3 (three) times daily as needed for Muscle spasms.    Levonorgestrel (MIRENA, 52 MG, IU) by Intrauterine route.    acetaminophen 500 MG Oral Tab Take 1 tablet (500 mg total) by mouth every 6 (six) hours as needed for Pain.    valACYclovir 500 MG Oral Tab Take by mouth as needed.    cetirizine 10 MG Oral Tab Take 1 tablet (10 mg total) by mouth daily as needed.      Diagnoses for This Visit    Encounter for well woman exam with routine gynecological exam   [9599730]  -  Primary  Screening for cervical cancer   [536033]     Encounter for routine checking of intrauterine contraceptive device (IUD)   [1978990]    Intrauterine contraceptive device threads lost, initial encounter   [[id number]]             Follow-up    Return for GYN ultrasound.     We Ordered the Following     Normal Orders This Visit    Hpv Dna  High Risk , Thin Prep Collect [GLG5642 CUSTOM]     Image-Guided Pap Smear ([facility]) [HBY1409 CPT(R)]     ThinPrep PAP Smear [[id number] CUSTOM]     Future Labs/Procedures Expected by Expires    Hpv Dna  High Risk , Thin Prep Collect [[id number] CUSTOM]  6/7/2024 6/7/2025    Pelvic US Complete GYNE Only [78007/11380] [[id number] CPT(R)]  6/7/2024 (Approximate) 6/7/2025    ThinPrep PAP Smear [[id number] CUSTOM]  6/7/2024 6/7/2025      Future Appointments        Provider Department    6/11/2024 11:15 AM [facility] OB US [facility] - OB/GYN    8/12/2024 5:00 PM [place] [address]    11/11/2024 5:00 PM [address]    2/17/2025 5:00 PM [address]      Follow-up Instructions    Return for GYN ultrasound.     Imaging Scheduling Instructions     Around June 7, 2024   Imaging:   Pelvic US Complete GYNE Only [12360/93194]                    Did you know that [facility] primary care physicians now offer Video Visits through mWater for adult patients for a variety of conditions such as allergies, back pain and cold symptoms? Skip the drive and waiting room and online chat with a doctor face-to-face using your web-cam enabled computer or mobile device wherever you are. Video Visits cost $50 and can be paid hassle-free using a credit, debit, or health savings card.  Not active on mWater? Ask us how to get signed up today!          If you receive a survey from [employer], please take a few minutes to complete it and provide  feedback. We strive to deliver the best patient experience and are looking for ways to make improvements. Your feedback will help us do so. For more information on [employer], please visit [email or web address]           No text in SmartText           No text in SmartText

## (undated) NOTE — MR AVS SNAPSHOT
[facility]  [address] [address] [phone]               Thank you for choosing us for your health care visit with Xin Kellogg PA-C.   We are glad to serve you and happy to provide you with this summary of yo helps. Remember, never give aspirin to anyone 25 or younger, or if you are already taking blood thinners.   · For sore throats caused by allergies, try antihistamines to block the allergic reaction.   · Remember: unless a sore throat is caused by a bacteria This list is accurate as of: 1/28/17  1:54 PM.  Always use your most recent med list.                Cyclobenzaprine HCl 5 MG Tabs   Take 1-2 tablets (5-10 mg total) by mouth 2 (two) times daily as needed for Muscle spasms.    Commonly known as:  [employer] office, you can view your past visit information in [employer] by going to Visits < Visit Summaries. [email or web address] questions? Call [phone] for help. [email or web address] is NOT to be used for urgent needs. For medical emergencies, dial 911.         Educational Inform Tips for increasing your physical activity – Adults who are physically active are less likely to develop some chronic diseases than adults who are inactive.      HOW TO GET STARTED: HOW TO STAY MOTIVATED:   Start activities slowly and build up over time Do

## (undated) NOTE — MR AVS SNAPSHOT
[address], [facility] [address] [address] [phone]               Thank you for choosing us for your health care visit with Con Bell MD.  We are glad to serve you and happy to provide you with this summar - Levonorgestrel-Ethinyl Estrad 0.1-20 MG-MCG Tabs            Results of Recent Testing       MyChart     Visit MyChart  You can access your MyChart to more actively manage your health care and view more details from this visit by going to [email or web address]

## (undated) NOTE — LETTER
10/24/18    Dear Dr. Mika Manjarerz      Thank you for referring your patient, Janay Lakhani to me for an evaluation. Please see my initial consult note enclosed below. Let me know if you have any questions.     Thank you  Massiel Abreu MD, Neurology  E months. She admits that she has been taking ibuprofen daily for at least the past 2 months and her headaches have worsened in the past couple of weeks. She is on an oral contraceptive pill, but has not breast-feeding.   She denies any visual aura with her • Cancer Maternal Grandmother         uterine and pancreatic   • Neurological Disorder Maternal Grandmother         ALS   • Psychiatric Father    • Hypertension Maternal Grandfather    • Diabetes Maternal Grandfather    • Breast Cancer Maternal Aunt Neck: Supple; full range of motion; no carotid bruits    Mental status:  Alert and oriented to time, place, person, and situation  Speech: fluent  Language: normal naming, repetition, and comprehension  Memory: normal  Attention/concentration: normal    Fu Prelim Neutrophil Abs      1.30 - 6.70 x10 (3) uL 5.54   Neutrophils Absolute      1.30 - 6.70 x10(3) uL 5.54   Lymphocytes Absolute      0.90 - 4.00 x10(3) uL 2.74   Monocytes Absolute      0.10 - 1.00 x10(3) uL 0.54   Eosinophils Absolute      0.00 - 0.3 Neurologic examination is currently nonfocal, with only finding being mild reflex asymmetry, with hyporeflexia in the upper extremities relative the lower extremities, which is likely related to patient's body habitus, and patient states that this is chron pending clinical changes and/or response to medication.      Patient was counseled regarding conservative management, stress reduction techniques, moist heat, massage, and OTC anti-inflammatory medications as needed; also discussed optimal timing of migrain

## (undated) NOTE — LETTER
AUTHORIZATION FOR SURGICAL OPERATION OR OTHER PROCEDURE    1. I hereby [facility] Dr. Aleksandra Nam and [facility] staff assigned to my case to perform the following operation and/or procedure at the [facility]:    _______________________________________________________________________________________________    Cortisone Injection to Left foot  _______________________________________________________________________________________________    2. My physician has explained the nature and purpose of the operation or other procedure, possible alternative methods of treatment, the risks involved, and the possibility of complication to me. I acknowledge that no guarantee has been made as to the result that may be obtained. 3.  I recognize that, during the course of this operation, or other procedure, unforseen conditions may necessitate additional or different procedure than those listed above. I, therefore, further authorize and request that the above named physician, his/her physician assistants or designees perform such procedures as are, in his/her professional opinion, necessary and desirable. 4.  Any tissue or organs removed in the operation or other procedure may be disposed of by and at the discretion of the [facility] and [facility] AT [facility]. 5.  I understand that in the event of a medical emergency, I will be transported by local paramedics to [facility] or other hospital emergency department. 6.  I certify that I have read and fully understand the above consent to operation and/or other procedure. 7.  I acknowledge that my physician has explained sedation/analgesia administration to me including the risks and benefits. I consent to the administration of sedation/analgesia as may be necessary or desirable in the judgement of my physician.     Witness signature: ___________________________________________________ Date:  ______/______/_____                    Time: ________ A. M.  P.M. Patient Name:  ______________________________________________________  (please print)      Patient signature:  ___________________________________________________             Relationship to Patient:           []  Parent    Responsible person                          []  Spouse  In case of minor or                    [] Other  _____________   Incompetent name:  __________________________________________________                               (please print)      _____________      Responsible person  In case of minor or  Incompetent signature:  _______________________________________________    Statement of Physician  My signature below affirms that prior to the time of the procedure, I have explained to the patient and/or his/her guardian, the risks and benefits involved in the proposed treatment and any reasonable alternative to the proposed treatment. I have also explained the risks and benefits involved in the refusal of the proposed treatment and have answered the patient's questions.                         Date:  ______/______/_______  Provider                      Signature:  __________________________________________________________       Time:  ___________ A.M    P.M.

## (undated) NOTE — Clinical Note
Thank you for referring Chandra Cadena to the [facility]. I met with her in consultation today via video visit. I have ordered labs to complete in 3 months, referred for a nutrition consultation with our dietician, and psychologist for support with mindful eating. She was started on off label Mounjaro for medication therapy and will follow-up with me in about 6 weeks.

## (undated) NOTE — LETTER
[address], [address] [place] [address]            December 26, 2018      [address]  [address]  [place] [id number]      Dear Ms. Chadwick Maxwell

## (undated) NOTE — LETTER
AUTHORIZATION FOR SURGICAL OPERATION OR OTHER PROCEDURE    1. I hereby authorize Dr. Cheryl Okeefe, and [facility] staff assigned to my case to perform the following operation and/or procedure at the [facility], [facility]:    Left foot cortisone injection _______________________________________________________________________________________________      _______________________________________________________________________________________________    2. My physician has explained the nature and purpose of the operation or other procedure, possible alternative methods of treatment, the risks involved, and the possibility of complication to me. I acknowledge that no guarantee has been made as to the result that may be obtained. 3.  I recognize that, during the course of this operation, or other procedure, unforseen conditions may necessitate additional or different procedure than those listed above. I, therefore, further authorize and request that the above named physician, his/her physician assistants or designees perform such procedures as are, in his/her professional opinion, necessary and desirable. 4.  Any tissue or organs removed in the operation or other procedure may be disposed of by and at the discretion of the [facility], [facility] and [facility]. 5.  I understand that in the event of a medical emergency, I will be transported by local paramedics to [facility] or other hospital emergency department. 6.  I certify that I have read and fully understand the above consent to operation and/or other procedure. 7.  I acknowledge that my physician has explained sedation/analgesia administration to me including the risks and benefits. I consent to the administration of sedation/analgesia as may be necessary or desirable in the judgement of my physician.     Witness signature: ___________________________________________________ Date:  ______/______/_____                    Time: ________ A. M.  P.M. Patient Name:  ______________________________________________________  (please print)      Patient signature:  ___________________________________________________             Relationship to Patient:           []  Parent    Responsible person                          []  Spouse  In case of minor or                    [] Other  _____________   Incompetent name:  __________________________________________________                               (please print)      _____________      Responsible person  In case of minor or  Incompetent signature:  _______________________________________________    Statement of Physician  My signature below affirms that prior to the time of the procedure, I have explained to the patient and/or his/her guardian, the risks and benefits involved in the proposed treatment and any reasonable alternative to the proposed treatment. I have also explained the risks and benefits involved in the refusal of the proposed treatment and have answered the patient's questions.                         Date:  ______/______/_______  Provider                      Signature:  __________________________________________________________       Time:  ___________ A.M    P.M.

## (undated) NOTE — ED AVS SNAPSHOT
Diane Mercedes   MRN: [id number]    Department:  [facility] Emergency Department   Date of Visit:  1/11/2020           Disclosure     Insurance plans vary and the physician(s) referred by the ER may not be covered by your plan.  Please contact yo tell this physician (or your personal doctor if your instructions are to return to your personal doctor) about any new or lasting problems. The primary care or specialist physician will see patients referred from the [facility] Emergency Department.  Melani Gaviria

## (undated) NOTE — ED AVS SNAPSHOT
[facility]  [address]    [address] [place] [address]    Phone:  [phone]    Fax:  [phone]           Bo Guan   MRN: [id number]    Department:  THE [facility]   Date of Visit: Expect to receive an electronic request (by e-mail or text) to complete a self-assessment the day after your visit. You may also receive a call from our patient liason soon after your visit.  Also, some patients receive a detailed feedback survey mailed to [place] [id number]  ([address]) [address] [facility] [phone] [address] [facility] [[email or web address]. ([address] [place] [address]) [phone] [address] [place] [address] [address] ( pain in bilateral knees when not moving. . Pain to go from bent knees to straight, causes pain. Started in left knee in February and now bilateral.         FINDINGS:    BONES:  Normal.  No significant arthropathy or acute abnormality.   SOFT TISSUES:  Lizzie Pro

## (undated) NOTE — LETTER
AUTHORIZATION FOR SURGICAL OPERATION OR OTHER PROCEDURE    1.  I hereby authorize Dr. Flor Arredondo and [facility] staff assigned to my case to perform the following operation and/or procedure at the [facility]:    _________________________________ ________ A. M.  P.M.        Patient Name:  ______________________________________________________  (please print)      Patient signature:  ___________________________________________________             Relationship to Patient:           []  Parent    Respo

## (undated) NOTE — Clinical Note
Instructions for Birth Control Pill Use      The birth control pill works primarily by blocking ovulation (release of an egg). If there is no egg to meet the sperm, pregnancy cannot occur.   The pill also works by making cervical mucous thick and unrecepti same time every day, like brushing your teeth in the morning, eating a meal or going to bed. Establishing a routine will make it easier for you to remember. The pill works best if you take it about the same time every day.     Continuing on the pills ~ What ? If you miss three or more pills in a row, do not take all 3 pills at once. If you are in the third week of pills, finish the pack and skip the inactive pills and start a new pack. Start your backup method of birth control immediately.   You are at risk

## (undated) NOTE — LETTER
Date & Time: 12/20/2018, 12:22 PM  Patient: Kathia Primes  Encounter Provider(s):    Rosales Barrera MD       To Whom It May Concern:    Clint Leone was seen and treated in our department on 12/20/2018.  She should not return to work until [employer]

## (undated) NOTE — LETTER
Date & Time: 2/19/2020, 8:57 AM  Patient: Keisha Romero  Encounter Provider(s):    Benjamin Frederick, DO       To Whom It May Concern:    Surinder Angelakarlo was seen and treated in our department on 2/19/2020. She should not return to work until 2/21/20.     If